# Patient Record
Sex: FEMALE | Race: WHITE | NOT HISPANIC OR LATINO | Employment: OTHER | URBAN - METROPOLITAN AREA
[De-identification: names, ages, dates, MRNs, and addresses within clinical notes are randomized per-mention and may not be internally consistent; named-entity substitution may affect disease eponyms.]

---

## 2017-01-04 ENCOUNTER — OFFICE VISIT (OUTPATIENT)
Dept: FAMILY MEDICINE | Facility: CLINIC | Age: 72
End: 2017-01-04
Payer: COMMERCIAL

## 2017-01-04 VITALS
TEMPERATURE: 98.7 F | WEIGHT: 151.38 LBS | DIASTOLIC BLOOD PRESSURE: 82 MMHG | SYSTOLIC BLOOD PRESSURE: 152 MMHG | BODY MASS INDEX: 25.84 KG/M2 | HEART RATE: 78 BPM | HEIGHT: 64 IN

## 2017-01-04 DIAGNOSIS — I10 ESSENTIAL HYPERTENSION WITH GOAL BLOOD PRESSURE LESS THAN 140/90: Primary | ICD-10-CM

## 2017-01-04 DIAGNOSIS — L70.9 ACNE, UNSPECIFIED ACNE TYPE: ICD-10-CM

## 2017-01-04 DIAGNOSIS — F41.9 ANXIETY: ICD-10-CM

## 2017-01-04 DIAGNOSIS — R21 SKIN RASH: ICD-10-CM

## 2017-01-04 DIAGNOSIS — R53.83 OTHER FATIGUE: ICD-10-CM

## 2017-01-04 PROCEDURE — 99214 OFFICE O/P EST MOD 30 MIN: CPT | Performed by: FAMILY MEDICINE

## 2017-01-04 PROCEDURE — 80048 BASIC METABOLIC PNL TOTAL CA: CPT | Performed by: FAMILY MEDICINE

## 2017-01-04 PROCEDURE — 36415 COLL VENOUS BLD VENIPUNCTURE: CPT | Performed by: FAMILY MEDICINE

## 2017-01-04 RX ORDER — TRIAMCINOLONE ACETONIDE 1 MG/G
CREAM TOPICAL 2 TIMES DAILY
Qty: 30 G | Refills: 1 | Status: SHIPPED | OUTPATIENT
Start: 2017-01-04 | End: 2019-02-11

## 2017-01-04 RX ORDER — CLOTRIMAZOLE 1 %
CREAM (GRAM) TOPICAL 2 TIMES DAILY
Qty: 60 G | Refills: 1 | Status: SHIPPED | OUTPATIENT
Start: 2017-01-04 | End: 2019-02-11

## 2017-01-04 RX ORDER — HYDROCHLOROTHIAZIDE 25 MG/1
25 TABLET ORAL DAILY
Qty: 90 TABLET | Refills: 3 | Status: SHIPPED | OUTPATIENT
Start: 2017-01-04 | End: 2018-01-10

## 2017-01-04 RX ORDER — CLOTRIMAZOLE 1 %
CREAM (GRAM) TOPICAL 2 TIMES DAILY
Qty: 60 G | Refills: 1 | Status: CANCELLED | OUTPATIENT
Start: 2017-01-04

## 2017-01-04 RX ORDER — BENZOYL PEROXIDE 5 G/100G
GEL TOPICAL DAILY PRN
Qty: 60 G | Refills: 1 | Status: SHIPPED | OUTPATIENT
Start: 2017-01-04 | End: 2019-02-11

## 2017-01-04 RX ORDER — CITALOPRAM HYDROBROMIDE 20 MG/1
10-20 TABLET ORAL DAILY
Qty: 90 TABLET | Refills: 3 | Status: SHIPPED | OUTPATIENT
Start: 2017-01-04 | End: 2018-03-14

## 2017-01-04 NOTE — PROGRESS NOTES
SUBJECTIVE:                                                    Julia Tabares is a 71 year old female who presents to clinic today for the following health issues:      Hypertension Follow-up      Outpatient blood pressures are being checked at home.  Results are 148/90's are highest. She feels more calm when taking the new pill, no further hot flashes.    Low Salt Diet: low salt       Amount of exercise or physical activity: 4-5 days/week for an average of greater than 60 minutes    Problems taking medications regularly: No    Medication side effects: none  Diet: low salt      Rash     Onset: has had for a few years     Description:   Location: belly button  Character: red  Itching (Pruritis): no     Progression of Symptoms:  same    Accompanying Signs & Symptoms:  Fever: no   Body aches or joint pain: YES- ago related  Sore throat symptoms: no   Recent cold symptoms: no    History:   Previous similar rash: YES- has been ongoing    Precipitating factors:   Exposure to similar rash: no   New exposures: None   Recent travel: no     Alleviating factors:  Lotrimin cream that was prescribed works, she needs a refill     Therapies Tried and outcome: Lotrimin and steroid cream.      Uses benzoyl peroxide for acne treatment as needed    Right wrist pain.  No change. She wears a compression glove that she bought on line that helps a little. She did not see ortho as they didn't call her. The pain wakes her up at night sometimes.  Pain is 8-9/10 routinely.    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, Silver Sneakers, and Bar  Quit teaching [employer] due to her knees  Thinking she'll probably do this for 2 more years  Every 4 months tries to change up her routines    Tiredness   says she doesn't snore  Feels drowsy enough to take a nap during the day and very drowsy after teaching  Doesn't feel drowsy driving    Says she also goes to bed late - sleeps   If she naps in the afternoon, goes to bed at 11:30 and gets up at 6:30/ 7 am  Has a hard time taking short naps   If she doesn't nap, still goes to bed late  Doesn't watch TV before bed, just reads before bed    Blood Pressure  Has been taking her 12.5 mg tablets of HCTZ and notices that she has to urinate more often   152/90 are home readings    Taking apple cider vinegar  Mother and dad both had high blood pressure      Has been using wrist sleeve       Problem list and histories reviewed & adjusted, as indicated.  Additional history: as documented    Patient Active Problem List   Diagnosis     Crohn's colitis (H)     Anxiety     Postmenopausal atrophic vaginitis     Advanced directives, counseling/discussion     Hyperlipidemia LDL goal <160     Left knee DJD     CTS (carpal tunnel syndrome)     Osteoporosis     Hypertension goal BP (blood pressure) < 140/90     Essential hypertension with goal blood pressure less than 140/90     Past Surgical History   Procedure Laterality Date     Hc reduction of large breast       C/section, low transverse  x3     , Low Transverse       Social History   Substance Use Topics      Smoking status: Former Smoker     Quit date: 10/13/1992     Smokeless tobacco: Never Used     Alcohol Use: No     Family History   Problem Relation Age of Onset     CANCER Mother      Hypertension Mother      HEART DISEASE Father      DIABETES No family hx of          Current Outpatient Prescriptions   Medication Sig Dispense Refill     hydrochlorothiazide 12.5 MG TABS Take 1 tablet (12.5 mg) by mouth daily 90 tablet 1     clotrimazole (LOTRIMIN) 1 % cream Apply topically 2 times daily 60 g 1     UNABLE TO FIND MEDICATION NAME: Cur Man Vitamin       UNABLE TO FIND MEDICATION NAME: Collagen vitamin       diclofenac (VOLTAREN) 1 % GEL Apply 4 grams to knees or 2 grams to hands four times daily using enclosed dosing card. 100 g 1     triamcinolone (KENALOG) 0.1 % cream Apply  topically 2 times daily. 30 g 0     mesalamine (ASACOL) 400 MG EC tablet 4 TABLETS DAILY       Calcium-Vitamin D (CALCIUM + D PO) Take  by mouth. ONCE DAILY.        Multiple Vitamin (MULTIVITAMIN OR) Take  by mouth. ONCE DAILY.        No Known Allergies  Recent Labs   Lab Test  10/17/16   1331  06/01/16   1033  05/29/15   1313  04/21/14   1333   LDL   --   137*  128  161*   HDL   --   68  68  57   TRIG   --   59  58  74   ALT  20   --   27  23   CR  0.75  0.72  0.70  0.75   GFRESTIMATED  76  81  82  77   GFRESTBLACK  >90   GFR Calc    >90   GFR Calc    >90   GFR Calc    >90   POTASSIUM  3.7  4.4  3.7  3.7   TSH  1.40   --   1.55  1.63      BP Readings from Last 3 Encounters:   01/04/17 164/82   10/17/16 136/70   09/16/16 135/70    Wt Readings from Last 3 Encounters:   01/04/17 151 lb 6 oz (68.663 kg)   10/17/16 148 lb 12.8 oz (67.495 kg)   09/16/16 149 lb (67.586 kg)                  Problem list, Medication list, Allergies, and Medical/Social/Surgical histories reviewed in EPIC and updated as appropriate.    ROS:  Constitutional, neuro, ENT, endocrine, pulmonary, cardiac, gastrointestinal,  "genitourinary, musculoskeletal, integument and psychiatric systems are negative, except as otherwise noted.      This document serves as a record of the services and decisions personally performed and made by Shanna Rodriguez. It was created on her behalf by Nancy Faulkner, a trained medical scribe. The creation of this document is based the provider's statements to the medical scribe.  Nancy Faulkner January 4, 2017 11:27 AM    OBJECTIVE:                                                    /82 mmHg  Pulse 78  Temp(Src) 98.7  F (37.1  C) (Oral)  Ht 5' 3.5\" (1.613 m)  Wt 151 lb 6 oz (68.663 kg)  BMI 26.39 kg/m2  Body mass index is 26.39 kg/(m^2).  GENERAL APPEARANCE: healthy, alert and no distress  SKIN: no suspicious lesions or rashes  PSYCH: mentation appears normal and affect normal/bright         ASSESSMENT/PLAN:                                                      Julia was seen today for musculoskeletal problem, hypertension and derm problem.    Diagnoses and all orders for this visit:    Essential hypertension with goal blood pressure less than 140/90  -     hydrochlorothiazide (HYDRODIURIL) 25 MG tablet; Take 1 tablet (25 mg) by mouth daily  -     Basic metabolic panel  - Not well controlled, increase HCTZ to 25 mg daily     Anxiety  -     citalopram (CELEXA) 20 MG tablet; Take 0.5-1 tablets (10-20 mg) by mouth daily  - Feels better when on SSRI, she will go back to taking 20 mg daily   - Weaning off citalopram did not help fatigue    Skin rash  -     triamcinolone (KENALOG) 0.1 % cream; Apply topically 2 times daily  -     clotrimazole (LOTRIMIN) 1 % cream; Apply topically 2 times daily  - Has recurrent rash at belly button, refills done    Acne, unspecified acne type  -     benzoyl peroxide 5 % topical gel; Apply topically daily as needed  - Chronic, stable, well controlled, continue current medication, refill done if needed    Other fatigue  -     SLEEP EVALUATION & MANAGEMENT REFERRAL - " ADULT; Future  -  patient will try napping less and having better routine over night, discussed stress management, can consider sleep evaluation      Follow up with Provider - 6 weeks for blood pressure recheck, will schedule with ortho for hand eval    Patient Instructions   See specialist for you wrist.    I will refill your citalopram and increase your HCTZ (blood pressure medication) dose.          The information in this document, created by the medical scribe, Nancy Faulkner, for me, accurately reflects the services I personally performed and the decisions made by me. I have reviewed and approved this document for accuracy prior to leaving the patient care area.  Shanna Rodriguez, January 4, 2017 11:26 AM      Shanna Rodriguez MD  [facility]

## 2017-01-04 NOTE — PATIENT INSTRUCTIONS
See specialist for you wrist.    I will refill your citalopram and increase your HCTZ (blood pressure medication) dose.

## 2017-01-04 NOTE — Clinical Note
[facility]  [address] [place] [id number] [phone]      January 6, 2017      Julia Tabares  [address] [facility] [address]          Dear Ms. Tabares    The results of your recent lab tests were within normal limits. Enclosed is a copy of these results.  If you have any further questions or problems, please contact our office.  Results for orders placed or performed in visit on 01/04/17   Basic metabolic panel   Result Value Ref Range    Sodium 139 133 - 144 mmol/L    Potassium 4.1 3.4 - 5.3 mmol/L    Chloride 101 94 - 109 mmol/L    Carbon Dioxide 30 20 - 32 mmol/L    Anion Gap 8 3 - 14 mmol/L    Glucose 86 70 - 99 mg/dL    Urea Nitrogen 14 7 - 30 mg/dL    Creatinine 0.82 0.52 - 1.04 mg/dL    GFR Estimate 69 >60 mL/min/1.7m2    GFR Estimate If Black 84 >60 mL/min/1.7m2    Calcium 9.6 8.5 - 10.1 mg/dL       Sincerely,      Shanna Rodriguez MD/wes

## 2017-01-04 NOTE — NURSING NOTE
"Chief Complaint   Patient presents with     Musculoskeletal Problem       Initial /82 mmHg  Pulse 78  Temp(Src) 98.7  F (37.1  C) (Oral)  Ht 5' 3.5\" (1.613 m)  Wt 151 lb 6 oz (68.663 kg)  BMI 26.39 kg/m2 Estimated body mass index is 26.39 kg/(m^2) as calculated from the following:    Height as of this encounter: 5' 3.5\" (1.613 m).    Weight as of this encounter: 151 lb 6 oz (68.663 kg).  BP completed using cuff size: josette LEMUS Certified Medical Assistant (AAMA)January 4, 2017 11:03 AM      "

## 2017-01-04 NOTE — MR AVS SNAPSHOT
"              After Visit Summary   1/4/2017    Julia Tabares    MRN: [id number]           Patient Information     Date Of Birth          1945        Visit Information        Provider Department      1/4/2017 11:00 AM Shanna Rodriguez MD [facility]        Today's Diagnoses     Essential hypertension with goal blood pressure less than 140/90    -  1     Anxiety         Skin rash         Acne, unspecified acne type           Care Instructions    See specialist for you wrist.    I will refill your citalopram and increase your HCTZ (blood pressure medication) dose.         Follow-ups after your visit        Who to contact     If you have questions or need follow up information about today's clinic visit or your schedule please contact [facility] directly at [phone].  Normal or non-critical lab and imaging results will be communicated to you by MyChart, letter or phone within 4 business days after the clinic has received the results. If you do not hear from us within 7 days, please contact the clinic through MyChart or phone. If you have a critical or abnormal lab result, we will notify you by phone as soon as possible.  Submit refill requests through [employer] or call your pharmacy and they will forward the refill request to us. Please allow 3 business days for your refill to be completed.          Additional Information About Your Visit        [employer] Information     [employer] lets you send messages to your doctor, view your test results, renew your prescriptions, schedule appointments and more. To sign up, go to [email or web address] . Click on \"Log in\" on the left side of the screen, which will take you to the Welcome page. Then click on \"Sign up Now\" on the right side of the page.     You will be asked to enter the access code listed below, as well as some personal information. Please follow the directions to create your username and password.     Your access code " "is: [email or web address]  Expires: 2017 12:13 PM     Your access code will  in 90 days. If you need help or a new code, please call your [facility] clinic or [phone].        Care EveryWhere ID     This is your Care EveryWhere ID. This could be used by other organizations to access your [facility] medical records  [id number]        Your Vitals Were     Pulse Temperature Height BMI (Body Mass Index)          78 98.7  F (37.1  C) (Oral) 5' 3.5\" (1.613 m) 26.39 kg/m2         Blood Pressure from Last 3 Encounters:   17 164/82   10/17/16 136/70   16 135/70    Weight from Last 3 Encounters:   17 151 lb 6 oz (68.663 kg)   10/17/16 148 lb 12.8 oz (67.495 kg)   16 149 lb (67.586 kg)              We Performed the Following     Basic metabolic panel          Today's Medication Changes          These changes are accurate as of: 17 12:13 PM.  If you have any questions, ask your nurse or doctor.               Start taking these medicines.        Dose/Directions    benzoyl peroxide 5 % topical gel   Used for:  Acne, unspecified acne type   Started by:  Shanna Rodriguez MD        Apply topically daily as needed   Quantity:  60 g   Refills:  1       citalopram 20 MG tablet   Commonly known as:  celeXA   Used for:  Anxiety   Started by:  Shanna Rodriguez MD        Dose:  10-20 mg   Take 0.5-1 tablets (10-20 mg) by mouth daily   Quantity:  90 tablet   Refills:  3         These medicines have changed or have updated prescriptions.        Dose/Directions    hydrochlorothiazide 25 MG tablet   Commonly known as:  HYDRODIURIL   This may have changed:    - medication strength  - how much to take   Used for:  Essential hypertension with goal blood pressure less than 140/90   Changed by:  Shanna Rodriguez MD        Dose:  25 mg   Take 1 tablet (25 mg) by mouth daily   Quantity:  90 tablet   Refills:  3            Where to get your medicines      These medications were sent to [facility] " [id number] - [place] - [address] [facility] [address]  [address] [facility] [address]     Phone:  [phone]    - benzoyl peroxide 5 % topical gel  - citalopram 20 MG tablet  - clotrimazole 1 % cream  - hydrochlorothiazide 25 MG tablet  - triamcinolone 0.1 % cream             Primary Care Provider Office Phone # Fax #    Shanna Rodriguez [phone]       [facility] [address] [facility] [address]        Thank you!     Thank you for choosing [facility]  for your care. Our goal is always to provide you with excellent care. Hearing back from our patients is one way we can continue to improve our services. Please take a few minutes to complete the written survey that you may receive in the mail after your visit with us. Thank you!             Your Updated Medication List - Protect others around you: Learn how to safely use, store and throw away your medicines at www.disposemymeds.org.          This list is accurate as of: 1/4/17 12:13 PM.  Always use your most recent med list.                   Brand Name Dispense Instructions for use    ASACOL 400 MG EC tablet   Generic drug:  mesalamine      4 TABLETS DAILY       benzoyl peroxide 5 % topical gel     60 g    Apply topically daily as needed       CALCIUM + D PO      Take  by mouth. ONCE DAILY.       citalopram 20 MG tablet    celeXA    90 tablet    Take 0.5-1 tablets (10-20 mg) by mouth daily       clotrimazole 1 % cream    LOTRIMIN    60 g    Apply topically 2 times daily       diclofenac 1 % Gel topical gel    VOLTAREN    100 g    Apply 4 grams to knees or 2 grams to hands four times daily using enclosed dosing card.       hydrochlorothiazide 25 MG tablet    HYDRODIURIL    90 tablet    Take 1 tablet (25 mg) by mouth daily       MULTIVITAMIN PO      Take  by mouth. ONCE DAILY.       triamcinolone 0.1 % cream    KENALOG    30 g    Apply topically 2 times daily       *  UNABLE TO FIND      MEDICATION NAME: Cur Man Vitamin       * UNABLE TO FIND      MEDICATION NAME: Collagen vitamin       * Notice:  This list has 2 medication(s) that are the same as other medications prescribed for you. Read the directions carefully, and ask your doctor or other care provider to review them with you.

## 2017-01-05 LAB
ANION GAP SERPL CALCULATED.3IONS-SCNC: 8 MMOL/L (ref 3–14)
BUN SERPL-MCNC: 14 MG/DL (ref 7–30)
CALCIUM SERPL-MCNC: 9.6 MG/DL (ref 8.5–10.1)
CHLORIDE SERPL-SCNC: 101 MMOL/L (ref 94–109)
CO2 SERPL-SCNC: 30 MMOL/L (ref 20–32)
CREAT SERPL-MCNC: 0.82 MG/DL (ref 0.52–1.04)
GFR SERPL CREATININE-BSD FRML MDRD: 69 ML/MIN/1.7M2
GLUCOSE SERPL-MCNC: 86 MG/DL (ref 70–99)
POTASSIUM SERPL-SCNC: 4.1 MMOL/L (ref 3.4–5.3)
SODIUM SERPL-SCNC: 139 MMOL/L (ref 133–144)

## 2017-03-20 ENCOUNTER — OFFICE VISIT (OUTPATIENT)
Dept: ORTHOPEDICS | Facility: CLINIC | Age: 72
End: 2017-03-20
Payer: COMMERCIAL

## 2017-03-20 ENCOUNTER — RADIANT APPOINTMENT (OUTPATIENT)
Dept: ULTRASOUND IMAGING | Facility: CLINIC | Age: 72
End: 2017-03-20
Attending: PEDIATRICS
Payer: COMMERCIAL

## 2017-03-20 ENCOUNTER — TELEPHONE (OUTPATIENT)
Dept: ORTHOPEDICS | Facility: CLINIC | Age: 72
End: 2017-03-20

## 2017-03-20 VITALS
HEIGHT: 64 IN | SYSTOLIC BLOOD PRESSURE: 160 MMHG | BODY MASS INDEX: 25.84 KG/M2 | WEIGHT: 151.38 LBS | HEART RATE: 134 BPM | DIASTOLIC BLOOD PRESSURE: 71 MMHG

## 2017-03-20 DIAGNOSIS — M79.605 LEFT LEG PAIN: ICD-10-CM

## 2017-03-20 DIAGNOSIS — M17.0 PRIMARY OSTEOARTHRITIS OF BOTH KNEES: Primary | ICD-10-CM

## 2017-03-20 DIAGNOSIS — M79.89 LEFT LEG SWELLING: ICD-10-CM

## 2017-03-20 PROCEDURE — 93971 EXTREMITY STUDY: CPT | Mod: LT

## 2017-03-20 PROCEDURE — 99214 OFFICE O/P EST MOD 30 MIN: CPT | Performed by: PEDIATRICS

## 2017-03-20 NOTE — TELEPHONE ENCOUNTER
Received VM. They are unable to see Julia today for an u/s and will need to reschedule. Please call [phone].

## 2017-03-20 NOTE — PROGRESS NOTES
"Sports Medicine Clinic Visit - Interim History 2017    Initial Visit Date Visit date not found    PCP: Shanna Rodriguez    Julia Tabares is a 71 year old female who is seen in f/u up for Primary osteoarthritis of both knees. Since last visit on 2016, patient reports pain and swelling in her posterior left knee and calf, limiting her range of motion. No new injury.  She has had overall good relief from the synvisc injection 2016.    Symptoms are better with: nothing  Symptoms are worse with: flexion, unable to get to 90 degrees  Additional Features:   Positive: swellin   Negative: bruising, popping, grinding, catching, locking, paresthesias, numbness, weakness, pain in other joints and systemic symptoms    Social History:     Review of Systems  Skin: no bruising, yes swelling  Musculoskeletal: as above  Neurologic: no numbness, paresthesias  Remainder of review of systems is negative including constitutional, CV, pulmonary, GI, except as noted in HPI or medical history.    Patient's current problem list, past medical and surgical history, and family history were reviewed.    Patient Active Problem List   Diagnosis     Crohn's colitis (H)     Anxiety     Postmenopausal atrophic vaginitis     Advanced directives, counseling/discussion     Hyperlipidemia LDL goal <160     Left knee DJD     CTS (carpal tunnel syndrome)     Osteoporosis     Hypertension goal BP (blood pressure) < 140/90     Essential hypertension with goal blood pressure less than 140/90     Past Medical History   Diagnosis Date     Anxiety      Crohn's colitis (H)      Past Surgical History   Procedure Laterality Date     Hc reduction of large breast       C/section, low transverse  x3     , Low Transverse     Family History   Problem Relation Age of Onset     CANCER Mother      Hypertension Mother      HEART DISEASE Father      DIABETES No family hx of        Objective  /71  Pulse 134  Ht 5' 3.5\" " (1.613 m)  Wt 151 lb 6 oz (68.7 kg)  BMI 26.39 kg/m2    GENERAL APPEARANCE: healthy, alert and no distress   GAIT: NORMAL  SKIN: no suspicious lesions or rashes  HEENT: Sclera clear, anicteric  CV: good peripheral pulses  RESP: Breathing not labored  NEURO: Normal strength and tone, mentation intact and speech normal  PSYCH:  mentation appears normal and affect normal/bright    Bilateral Knee exam    Inspection:      no effusion, swelling of bruising bilateral       Mild posterior swelling on the left with some fullness throughout the calf on that side    Patella:      Mobility -       hypomobile bilateral       Crepitus noted in the patellofemoral joint bilateral    Tender:      Posterior left knee    Non Tender:      remainder of knee area bilateral    Knee ROM:      Flexion 90 degrees left       Extension 5 degrees left    Strength:      5/5 with knee extension bilateral    Special Tests:     neg (-) Nia left       neg (-) anterior drawer left       neg (-) posterior drawer left       neg (-) varus at 0 and 30 left       neg (-) valgus at 0 and 30 left    Gait:      normal    Neurovascular:      2+ peripheral pulses bilaterally and brisk capillary refill       sensation grossly intact    Radiology  None new    Assessment:  1. Primary osteoarthritis of both knees    2. Left leg pain    3. Left leg swelling      1) Left Leg Swelling: Differential includes blood clot along with Baker's Cyst.  Will obtain US to evaluate for blood clot.  Discussed treatment for Baker's Cyst including compression, ice, rest and possible aspiration and/or injection.    2) Osteoarthritis:  Received good relief from last Synvisc injection, can repeat as it's been 6 months.  She will hold on scheduling pending US results.    Plan:  - Today's Plan of Care:  Ultrasound of the left lower extremity - 3:10 at [facility], arrive 15 minutes prior.    -We also discussed other future treatment options:  Ultrasound guided Synvisc One  injection of left knee pending results    Follow Up: will call with results    Concerning signs and symptoms were reviewed.  The patient expressed understanding of this management plan and all questions were answered at this time.    Indu Chua MD CAQ  Primary Care Sports Medicine  [facility]

## 2017-03-20 NOTE — MR AVS SNAPSHOT
After Visit Summary   3/20/2017    Julia Tbaares    MRN: [id number]           Patient Information     Date Of Birth          1945        Visit Information        Provider Department      3/20/2017 12:40 PM Indu Chua MD [facility]        Today's Diagnoses     Primary osteoarthritis of both knees    -  1    Left leg pain          Care Instructions    Plan:  - Today's Plan of Care:  Ultrasound of the left lower extremity - 3:10 at [facility], arrive 15 minutes prior.    -We also discussed other future treatment options:  Ultrasound guided Synvisc One injection of left knee pending results    Follow Up: will call with results    If you have any further questions for your physician or physician s care team you can call [phone] and use option 3 to leave a voice message. Calls received during business hours will be returned same day.          Follow-ups after your visit        Your next 10 appointments already scheduled     Mar 20, 2017  3:10 PM CDT   US LOWER EXTREMITY VENOUS DUPLEX LEFT with BEUS1   [facility]    [id number] [place] [id number] [phone]           Please bring a list of your medicines (including vitamins, minerals and over-the-counter drugs). Also, tell your doctor about any allergies you may have. Wear comfortable clothes and leave your valuables at home.  You do not need to do anything special to prepare for your exam.  Please call the Imaging Department at your exam site with any questions.            Apr 03, 2017  2:00 PM CDT   [facility] Visit with [facility]   [facility]    [id number] [place] [address] [phone]              Future tests that were ordered for you today     Open Future Orders        Priority Expected Expires Ordered    US Lower Extremity Venous Duplex Left STAT  3/20/2018  "3/20/2017            Who to contact     If you have questions or need follow up information about today's clinic visit or your schedule please contact [facility] directly at [phone].  Normal or non-critical lab and imaging results will be communicated to you by MyChart, letter or phone within 4 business days after the clinic has received the results. If you do not hear from us within 7 days, please contact the clinic through MyChart or phone. If you have a critical or abnormal lab result, we will notify you by phone as soon as possible.  Submit refill requests through Samplesaint or call your pharmacy and they will forward the refill request to us. Please allow 3 business days for your refill to be completed.          Additional Information About Your Visit        Diagnostic [employer]ar[employer]koo Information     Samplesaint gives you secure access to your electronic health record. If you see a primary care provider, you can also send messages to your care team and make appointments. If you have questions, please call your primary care clinic.  If you do not have a primary care provider, please call [phone] and they will assist you.        Care EveryWhere ID     This is your Care EveryWhere ID. This could be used by other organizations to access your [facility] medical records  [id number]        Your Vitals Were     Pulse Height BMI (Body Mass Index)             134 5' 3.5\" (1.613 m) 26.39 kg/m2          Blood Pressure from Last 3 Encounters:   03/20/17 160/71   01/04/17 152/82   10/17/16 136/70    Weight from Last 3 Encounters:   03/20/17 151 lb 6 oz (68.7 kg)   01/04/17 151 lb 6 oz (68.7 kg)   10/17/16 148 lb 12.8 oz (67.5 kg)               Primary Care Provider Office Phone # Fax #    Shanna Rodriguez [phone]       [address] [address]        Thank you!     Thank you for choosing [facility]  for " your care. Our goal is always to provide you with excellent care. Hearing back from our patients is one way we can continue to improve our services. Please take a few minutes to complete the written survey that you may receive in the mail after your visit with us. Thank you!             Your Updated Medication List - Protect others around you: Learn how to safely use, store and throw away your medicines at www.disposemymeds.org.          This list is accurate as of: 3/20/17  1:20 PM.  Always use your most recent med list.                   Brand Name Dispense Instructions for use    ASACOL 400 MG EC tablet   Generic drug:  mesalamine      4 TABLETS DAILY       benzoyl peroxide 5 % topical gel     60 g    Apply topically daily as needed       CALCIUM + D PO      Take  by mouth. ONCE DAILY.       citalopram 20 MG tablet    celeXA    90 tablet    Take 0.5-1 tablets (10-20 mg) by mouth daily       clotrimazole 1 % cream    LOTRIMIN    60 g    Apply topically 2 times daily       diclofenac 1 % Gel topical gel    VOLTAREN    100 g    Apply 4 grams to knees or 2 grams to hands four times daily using enclosed dosing card.       hydrochlorothiazide 25 MG tablet    HYDRODIURIL    90 tablet    Take 1 tablet (25 mg) by mouth daily       MULTIVITAMIN PO      Take  by mouth. ONCE DAILY.       triamcinolone 0.1 % cream    KENALOG    30 g    Apply topically 2 times daily       * UNABLE TO FIND      MEDICATION NAME: Cur Man Vitamin       * UNABLE TO FIND      MEDICATION NAME: Collagen vitamin       * Notice:  This list has 2 medication(s) that are the same as other medications prescribed for you. Read the directions carefully, and ask your doctor or other care provider to review them with you.

## 2017-03-20 NOTE — PATIENT INSTRUCTIONS
Plan:  - Today's Plan of Care:  Ultrasound of the left lower extremity - 3:10 at [facility] arrive 15 minutes prior.    -We also discussed other future treatment options:  Ultrasound guided Synvisc One injection of left knee pending results    Follow Up: will call with results    If you have any further questions for your physician or physician s care team you can call [phone] and use option 3 to leave a voice message. Calls received during business hours will be returned same day.

## 2017-03-21 NOTE — TELEPHONE ENCOUNTER
FRANTZ for return call, asked that she leave a few times that would work for us to call her.    Verna Ramos, ATC

## 2017-03-22 NOTE — TELEPHONE ENCOUNTER
Spoke to patient. Helped schedule with Dr. Solorzano for Friday May 31st for synvisc injections.    JAZLYN Allred,  ATC

## 2017-04-21 ENCOUNTER — OFFICE VISIT (OUTPATIENT)
Dept: ORTHOPEDICS | Facility: CLINIC | Age: 72
End: 2017-04-21
Payer: COMMERCIAL

## 2017-04-21 VITALS
HEIGHT: 64 IN | BODY MASS INDEX: 25.78 KG/M2 | SYSTOLIC BLOOD PRESSURE: 118 MMHG | WEIGHT: 151 LBS | DIASTOLIC BLOOD PRESSURE: 78 MMHG

## 2017-04-21 DIAGNOSIS — M17.0 PRIMARY OSTEOARTHRITIS OF BOTH KNEES: Primary | ICD-10-CM

## 2017-04-21 PROCEDURE — 20611 DRAIN/INJ JOINT/BURSA W/US: CPT | Mod: 50 | Performed by: FAMILY MEDICINE

## 2017-04-21 NOTE — PROGRESS NOTES
Julia Tabares  :  1945  DOS: 17  MRN: [id number]    Sports Medicine Clinic Procedure    Ultrasound Guided Bilateral Intra-Articular Knee SynviscOne Injection    Clinical History: Julia Tabares is a 70 year old female who is seen in f/u up for knee osteoarthritis. Since last visit on 2015 patient reports right sided knee pain has been exacerbated over the past 2 months.  - Pain is mostly anterior and medial side.  Mild swelling.  No new injury.  Left knee is somewhat painful, however, not as bad.  Hasn't had an injection since 2014 - has gotten both intra-articular and pes injections from Dr. Meng in th past.  PT after last visit helped some.  Synvisc One injection completed 16 provided good relief for ~ 6 months, note worsening bilateral knee pain left>>>right over last 3- 4 weeks.  Moderate pain swelling noted in posterior aspect of left knee - baker's cyst noted on US completed 3/20/17.    Diagnosis:   1. Primary osteoarthritis of both knees      Referring Physician: Indu Chua MD  Technique: The risks of the procedure were explained to the patient.  A consent was signed for the intra-articular knee procedure.  The patient was evaluated with a [employer] ultrasound machine using a 12 MHz linear probe.     The Bilateral knee was prepped and draped in a sterile manner.  Ultrasound identification of the patella, suprapatellar pouch, quadriceps tendon and femur in both long and short axis. The probe was placed in long axis to the Bilateral femur.  3 ml's of lidocaine used for local anesthetic.  A 1.5 inch 18 gauge needle was placed under ultrasound guidance into the superior knee joint.  18 ml of straw colored fluid was aspirated from right, 3 ml aspirated from left.   6ml of SynviscOne (48 units) into the bilateral knee. The needle was removed and there was good hemostasis without complications.  There was ultrasound documentation of needle placement and injection.       Impression:  Successful Bilateral intra-articular knee SynviscOne and injection.    Plan:  Follow up in 2 weeks for possible baker's cyst aspiration if not getting relief.  Expectations and limitations of synvisc were reviewed in detail  Often 4-6 weeks before full effect may be noticed  Usually covered up to every 6 months by insurance, but does not need to be repeated unless pain returns, at which point we would re-evaluate  Potential use of CSI in future for flares of pain reviewed in detail  Encouraged modified progressive pain-free activity as tolerated  HEP and Supportive care reviewed  All questions were answered today  Contact us with additional questions or concerns  Signs and sx of concern reviewed    Shashi Solorzano DO, CANAUN  Primary Care Sports Medicine  [facility]

## 2017-04-21 NOTE — NURSING NOTE
"Chief Complaint   Patient presents with     Knee Pain     bilateral knee - US injection       Initial /78  Ht 5' 3.5\" (1.613 m)  Wt 151 lb (68.5 kg)  BMI 26.33 kg/m2 Estimated body mass index is 26.33 kg/(m^2) as calculated from the following:    Height as of this encounter: 5' 3.5\" (1.613 m).    Weight as of this encounter: 151 lb (68.5 kg).  Medication Reconciliation: complete     Harvey Salas, ATC  "

## 2017-05-12 ENCOUNTER — OFFICE VISIT (OUTPATIENT)
Dept: ORTHOPEDICS | Facility: CLINIC | Age: 72
End: 2017-05-12
Payer: COMMERCIAL

## 2017-05-12 VITALS
BODY MASS INDEX: 25.78 KG/M2 | WEIGHT: 151 LBS | DIASTOLIC BLOOD PRESSURE: 75 MMHG | SYSTOLIC BLOOD PRESSURE: 120 MMHG | HEIGHT: 64 IN

## 2017-05-12 DIAGNOSIS — M17.0 PRIMARY OSTEOARTHRITIS OF BOTH KNEES: ICD-10-CM

## 2017-05-12 DIAGNOSIS — M71.22 BAKER'S CYST, LEFT: Primary | ICD-10-CM

## 2017-05-12 PROCEDURE — 20611 DRAIN/INJ JOINT/BURSA W/US: CPT | Mod: LT | Performed by: FAMILY MEDICINE

## 2017-05-12 PROCEDURE — 99214 OFFICE O/P EST MOD 30 MIN: CPT | Mod: 25 | Performed by: FAMILY MEDICINE

## 2017-05-12 RX ORDER — TRIAMCINOLONE ACETONIDE 40 MG/ML
40 INJECTION, SUSPENSION INTRA-ARTICULAR; INTRAMUSCULAR ONCE
Qty: 1 ML | Refills: 0 | OUTPATIENT
Start: 2017-05-12 | End: 2017-05-12

## 2017-05-12 NOTE — NURSING NOTE
"Chief Complaint   Patient presents with     Knee Pain     f/u chronic chronic left knee pain       Initial /75  Ht 5' 3.5\" (1.613 m)  Wt 151 lb (68.5 kg)  BMI 26.33 kg/m2 Estimated body mass index is 26.33 kg/(m^2) as calculated from the following:    Height as of this encounter: 5' 3.5\" (1.613 m).    Weight as of this encounter: 151 lb (68.5 kg).  Medication Reconciliation: complete     Harvey Salas ATC  "

## 2017-05-12 NOTE — MR AVS SNAPSHOT
After Visit Summary   5/12/2017    Julia Tabares    MRN: [id number]           Patient Information     Date Of Birth          1945        Visit Information        Provider Department      5/12/2017 1:00 PM Shashi Solorzano DO [facility]        Today's Diagnoses     Baker's cyst, left    -  1    Primary osteoarthritis of both knees           Follow-ups after your visit        Your next 10 appointments already scheduled     May 15, 2017  2:00 PM CDT   New Visit with [facility]ne [facility] MD   [facility]    [id number] [place] [id number] [phone]              Who to contact     If you have questions or need follow up information about today's clinic visit or your schedule please contact [facility] directly at [phone].  Normal or non-critical lab and imaging results will be communicated to you by MyChart, letter or phone within 4 business days after the clinic has received the results. If you do not hear from us within 7 days, please contact the clinic through Yohobuyhart or phone. If you have a critical or abnormal lab result, we will notify you by phone as soon as possible.  Submit refill requests through English[employer] or call your pharmacy and they will forward the refill request to us. Please allow 3 business days for your refill to be completed.          Additional Information About Your Visit        MyChart Information     EnglishCentral gives you secure access to your electronic health record. If you see a primary care provider, you can also send messages to your care team and make appointments. If you have questions, please call your primary care clinic.  If you do not have a primary care provider, please call [phone] and they will assist you.        Care EveryWhere ID     This is your Care EveryWhere ID. This could be used by other organizations to access your  "[facility] medical records  [id number]        Your Vitals Were     Height BMI (Body Mass Index)                5' 3.5\" (1.613 m) 26.33 kg/m2           Blood Pressure from Last 3 Encounters:   05/12/17 120/75   04/21/17 118/78   03/20/17 160/71    Weight from Last 3 Encounters:   05/12/17 151 lb (68.5 kg)   04/21/17 151 lb (68.5 kg)   03/20/17 151 lb 6 oz (68.7 kg)              We Performed the Following     HC ARTHROCENTESIS ASPIR&/INJ MAJOR JT/BURSA W/US     TRIAMCINOLONE ACET INJ NOS          Today's Medication Changes          These changes are accurate as of: 5/12/17  1:48 PM.  If you have any questions, ask your nurse or doctor.               Start taking these medicines.        Dose/Directions    triamcinolone acetonide 40 MG/ML injection   Commonly known as:  KENALOG-40   Used for:  Baker's cyst, left   Started by:  Shashi Solorzano,         Dose:  40 mg   1 mL (40 mg) by INTRA-ARTICULAR route once for 1 dose   Quantity:  1 mL   Refills:  0            Where to get your medicines      Some of these will need a paper prescription and others can be bought over the counter.  Ask your nurse if you have questions.     You don't need a prescription for these medications     triamcinolone acetonide 40 MG/ML injection                Primary Care Provider Office Phone # Fax #    Shanna Rodriguez [phone]       [address] [address]        Thank you!     Thank you for choosing [facility]  for your care. Our goal is always to provide you with excellent care. Hearing back from our patients is one way we can continue to improve our services. Please take a few minutes to complete the written survey that you may receive in the mail after your visit with us. Thank you!             Your Updated Medication List - Protect others around you: Learn how to safely use, store and throw away your medicines at " www.disposemymeds.org.          This list is accurate as of: 5/12/17  1:48 PM.  Always use your most recent med list.                   Brand Name Dispense Instructions for use    ASACOL 400 MG EC tablet   Generic drug:  mesalamine      4 TABLETS DAILY       benzoyl peroxide 5 % topical gel     60 g    Apply topically daily as needed       CALCIUM + D PO      Take  by mouth. ONCE DAILY.       citalopram 20 MG tablet    celeXA    90 tablet    Take 0.5-1 tablets (10-20 mg) by mouth daily       clotrimazole 1 % cream    LOTRIMIN    60 g    Apply topically 2 times daily       diclofenac 1 % Gel topical gel    VOLTAREN    100 g    Apply 4 grams to knees or 2 grams to hands four times daily using enclosed dosing card.       hydrochlorothiazide 25 MG tablet    HYDRODIURIL    90 tablet    Take 1 tablet (25 mg) by mouth daily       MULTIVITAMIN PO      Take  by mouth. ONCE DAILY.       triamcinolone 0.1 % cream    KENALOG    30 g    Apply topically 2 times daily       triamcinolone acetonide 40 MG/ML injection    KENALOG-40    1 mL    1 mL (40 mg) by INTRA-ARTICULAR route once for 1 dose       * UNABLE TO FIND      MEDICATION NAME: Cur Man Vitamin       * UNABLE TO FIND      MEDICATION NAME: Collagen vitamin       * Notice:  This list has 2 medication(s) that are the same as other medications prescribed for you. Read the directions carefully, and ask your doctor or other care provider to review them with you.

## 2017-05-12 NOTE — PROGRESS NOTES
Julia Tabares  :  1945  DOS: May 12, 2017  MRN: [id number]    Sports Medicine Clinic Visit - Interim History 2017    Initial Visit Date Visit date not found    PCP: Shanna Rodriguez    Julia Tabares is a 71 year old female who is seen in f/u up for Primary osteoarthritis of both knees. Since last visit on 2016, patient reports pain and swelling in her posterior left knee and calf, limiting her range of motion. No new injury.  She has had overall good relief from the synvisc injection 2016.    Symptoms are better with: nothing  Symptoms are worse with: flexion, unable to get to 90 degrees  Additional Features:   Positive: swellin   Negative: bruising, popping, grinding, catching, locking, paresthesias, numbness, weakness, pain in other joints and systemic symptoms    Social History:     Interim History - May 12, 2017  Since last visit on 2017 patient has mild-moderate left knee pain.  Synvisc One injection completed on 17 provided ~ 70 - 80% relief, continues to have discomfort in posterior knee.  Desiring to pursue baker's cyst aspiration as previously discussed.  No new injury in the interim.    Review of Systems  Skin: no bruising, yes swelling  Musculoskeletal: as above  Neurologic: no numbness, paresthesias  Remainder of review of systems is negative including constitutional, CV, pulmonary, GI, except as noted in HPI or medical history.    Patient's current problem list, past medical and surgical history, and family history were reviewed.    Patient Active Problem List   Diagnosis     Crohn's colitis (H)     Anxiety     Postmenopausal atrophic vaginitis     Advanced directives, counseling/discussion     Hyperlipidemia LDL goal <160     Left knee DJD     CTS (carpal tunnel syndrome)     Osteoporosis     Hypertension goal BP (blood pressure) < 140/90     Essential hypertension with goal blood pressure less than 140/90     Past Medical History:  "  Diagnosis Date     Anxiety      Crohn's colitis (H)      Past Surgical History:   Procedure Laterality Date     C/SECTION, LOW TRANSVERSE  x3    , Low Transverse     HC REDUCTION OF LARGE BREAST       Family History   Problem Relation Age of Onset     CANCER Mother      Hypertension Mother      HEART DISEASE Father      DIABETES No family hx of        Objective  /75  Ht 5' 3.5\" (1.613 m)  Wt 151 lb (68.5 kg)  BMI 26.33 kg/m2    GENERAL APPEARANCE: healthy, alert and no distress   GAIT: NORMAL  SKIN: no suspicious lesions or rashes  HEENT: Sclera clear, anicteric  CV: good peripheral pulses  RESP: Breathing not labored  NEURO: Normal strength and tone, mentation intact and speech normal  PSYCH:  mentation appears normal and affect normal/bright    Bilateral Knee exam    Inspection:      no effusion, swelling of bruising bilateral       Mild posterior swelling on the left with some fullness throughout the calf on that side    Patella:      Mobility -       hypomobile bilateral       Crepitus noted in the patellofemoral joint bilateral    Tender:      Posterior left knee    Non Tender:      remainder of knee area bilateral    Knee ROM:      Flexion 90 degrees left       Extension 5 degrees left    Strength:      5/5 with knee extension bilateral    Special Tests:     neg (-) Nia left       neg (-) anterior drawer left       neg (-) posterior drawer left       neg (-) varus at 0 and 30 left       neg (-) valgus at 0 and 30 left    Gait:      normal    Neurovascular:      2+ peripheral pulses bilaterally and brisk capillary refill       sensation grossly intact    Sports Medicine Clinic Procedure  Ultrasound Guided Left Popliteal Cyst Aspiration and Injection    Technique: The risks of the procedure were explained to the patient.  A consent was given for the popliteal cyst aspiration and injection.  The patient was evaluated with a [employer] ultrasound machine using a 12 MHz linear probe.     The " Left knee was prepped and draped in a sterile manner.  Ultrasound identification of the popliteal cyst, medial head of the gastrocnemius muscle and tendon, semimembranosis tendon and semitendinosis tendon in both long and short axis.  The location of adjacent neurovascular structures were noted and marked.  The probe was placed in long axis to the Leftknee.  A 1.5 inch 18 gauge needle was placed under ultrasound guidance into the popliteal Cyst.  7 ml's of straw colored fluid was aspirated from the cyst. 1 ml of kenalog (40mg/ml) was injected without difficulty on long axis view.  The needle was removed and there was good hemostasis without complications.  There was ultrasound documentation of needle placement and injection.     Radiology  None new    Assessment:  1. Baker's cyst, left    2. Primary osteoarthritis of both knees          Plan:  - Today's Plan of Care:  Ultrasound of the left lower extremity reviewed again  Injection and aspiration of baker's cyst  Received good relief from Synvisc injection but ongoing discomfort in back of leg interfering with activty  Immediate relief from drainage and anesthetic effect today, will follow clinically  Reviewed low impact exercise choices to build into her routine  F/u with Dr Harshil keyes  Expectations and goals of CSI reviewed  Often 2-3 days for steroid effect, and can take up to two weeks for maximum effect  We discussed modified progressive pain-free activity as tolerated  Do not overuse in first two weeks if feeling better due to concern for vulnerability while steroid is working  Supportive care reviewed  All questions were answered today  Contact us with additional questions or concerns  Signs and sx of concern reviewed      Shashi Solorzano DO, GERALD  Primary Care Sports Medicine  [facility]

## 2017-05-15 ENCOUNTER — OFFICE VISIT (OUTPATIENT)
Dept: ORTHOPEDICS | Facility: CLINIC | Age: 72
End: 2017-05-15
Payer: COMMERCIAL

## 2017-05-15 ENCOUNTER — RADIANT APPOINTMENT (OUTPATIENT)
Dept: GENERAL RADIOLOGY | Facility: CLINIC | Age: 72
End: 2017-05-15
Attending: ORTHOPAEDIC SURGERY
Payer: COMMERCIAL

## 2017-05-15 VITALS
HEIGHT: 64 IN | BODY MASS INDEX: 25.27 KG/M2 | WEIGHT: 148 LBS | OXYGEN SATURATION: 98 % | DIASTOLIC BLOOD PRESSURE: 70 MMHG | SYSTOLIC BLOOD PRESSURE: 136 MMHG | HEART RATE: 63 BPM

## 2017-05-15 DIAGNOSIS — M79.642 BILATERAL HAND PAIN: ICD-10-CM

## 2017-05-15 DIAGNOSIS — M18.0 PRIMARY OSTEOARTHRITIS OF BOTH FIRST CARPOMETACARPAL JOINTS: ICD-10-CM

## 2017-05-15 DIAGNOSIS — M79.641 BILATERAL HAND PAIN: ICD-10-CM

## 2017-05-15 DIAGNOSIS — G56.02 CARPAL TUNNEL SYNDROME OF LEFT WRIST: Primary | ICD-10-CM

## 2017-05-15 PROCEDURE — 99203 OFFICE O/P NEW LOW 30 MIN: CPT | Mod: 25 | Performed by: ORTHOPAEDIC SURGERY

## 2017-05-15 PROCEDURE — 20600 DRAIN/INJ JOINT/BURSA W/O US: CPT | Mod: LT | Performed by: ORTHOPAEDIC SURGERY

## 2017-05-15 PROCEDURE — 73130 X-RAY EXAM OF HAND: CPT | Mod: LT | Performed by: RADIOLOGY

## 2017-05-15 ASSESSMENT — PAIN SCALES - GENERAL: PAINLEVEL: MILD PAIN (3)
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
## 2017-05-15 NOTE — PATIENT INSTRUCTIONS
Thanks for coming today.  [facility]  [address] [place] [address]    To schedule future appointments in Ortho Clinic, you may call [phone].    To schedule ordered imaging by your provider:   Call Central Imaging [phone]    To schedule an injection ordered by your provider:  Call Central Imaging Injection scheduling line: [phone]  [facility] available online at:  [email or web address]    Please call if any further questions or concerns ([phone]).  Clinic hours 8 am to 5 pm.    Return to clinic (call) if symptoms worsen or fail to improve.

## 2017-05-15 NOTE — NURSING NOTE
"Julia Tabares's goals for this visit include: Evaluate bilateral thumb pain and evening finger numbness.  She requests these members of her care team be copied on today's visit information: Yes    PCP: Shanna Rodriguez    Referring Provider:  No referring provider defined for this encounter.    Chief Complaint   Patient presents with     Consult     Bilateral thumb pain and finger numbness.        Initial /70  Pulse 63  Ht 1.613 m (5' 3.5\")  Wt 67.1 kg (148 lb)  SpO2 98%  BMI 25.81 kg/m2 Estimated body mass index is 25.81 kg/(m^2) as calculated from the following:    Height as of this encounter: 1.613 m (5' 3.5\").    Weight as of this encounter: 67.1 kg (148 lb).  Medication Reconciliation: complete    "

## 2017-05-24 ENCOUNTER — TELEPHONE (OUTPATIENT)
Dept: FAMILY MEDICINE | Facility: CLINIC | Age: 72
End: 2017-05-24

## 2017-05-24 NOTE — TELEPHONE ENCOUNTER
Reason for Call: Request for an order or referral:    Order or referral being requested: Order  -Upper scope    Date needed: as soon as possible    Has the patient been seen by the PCP for this problem? YES    Additional comments: ASAP    Phone number Patient can be reached at:  Home number on file [phone] (home)    Best Time:  any    Can we leave a detailed message on this number?  YES    Call taken on 5/24/2017 at 3:13 PM by Zayda Woodruff

## 2017-05-24 NOTE — TELEPHONE ENCOUNTER
Going in for colonoscopy June 16th. She usually also has an upper endoscopy at the same time. She thinks she probably forgot to ask you about this. She is having a return of the symptoms that she had this done for before-(burning in her chest, dizziness that comes and goes-states that this not a heart attack and she had this before-but I advised her that she should go to the ER if these are persisting and progressing symptoms. She denied any other symptoms). She is having this done at [facility] [address], [facility]. Will you order?    Jeremy Murray RN

## 2017-05-30 NOTE — TELEPHONE ENCOUNTER
Reason for Call:  Other order    Detailed comments: Patient has colonoscopy scheduled for 6/16, would like to have upper checked out as well while in the colonoscopy. Please call to advise    Phone Number Patient can be reached at: Home number on file [phone] (home)    Best Time: anytime    Can we leave a detailed message on this number? YES    Call taken on 5/30/2017 at 4:06 PM by Jeremy Leary

## 2017-05-31 NOTE — TELEPHONE ENCOUNTER
Called and scheduled patient with PCP to first available I could find - 6/26/17.  Currently, colonoscopy is scheduled for 6/16 which means patient would need to change this to after 6/26 (she is hoping to add on upper GI per previous notes).     Dr. Rodriguez - Is there a time prior to 6/16 that you could see patient? Or keep as scheduled?    Thank you,  Luca Moreno RN

## 2017-05-31 NOTE — TELEPHONE ENCOUNTER
Called patient and changed appointment time.  She was very appreciative.  Reviewed warning signs and when to seek urgent medical attention.  Patient verbalized understanding.    Luca Moreno RN

## 2017-06-05 ENCOUNTER — OFFICE VISIT (OUTPATIENT)
Dept: FAMILY MEDICINE | Facility: CLINIC | Age: 72
End: 2017-06-05
Payer: COMMERCIAL

## 2017-06-05 VITALS
WEIGHT: 151.38 LBS | BODY MASS INDEX: 25.84 KG/M2 | TEMPERATURE: 98.6 F | SYSTOLIC BLOOD PRESSURE: 142 MMHG | OXYGEN SATURATION: 96 % | DIASTOLIC BLOOD PRESSURE: 76 MMHG | HEART RATE: 74 BPM | HEIGHT: 64 IN

## 2017-06-05 DIAGNOSIS — Z87.19 HISTORY OF GASTROESOPHAGEAL REFLUX (GERD): ICD-10-CM

## 2017-06-05 DIAGNOSIS — I10 ESSENTIAL HYPERTENSION WITH GOAL BLOOD PRESSURE LESS THAN 140/90: ICD-10-CM

## 2017-06-05 DIAGNOSIS — K50.10 CROHN'S COLITIS, WITHOUT COMPLICATIONS (H): ICD-10-CM

## 2017-06-05 DIAGNOSIS — R42 DIZZINESS: ICD-10-CM

## 2017-06-05 DIAGNOSIS — K44.9 HIATAL HERNIA: ICD-10-CM

## 2017-06-05 DIAGNOSIS — R53.83 OTHER FATIGUE: Primary | ICD-10-CM

## 2017-06-05 LAB
ALBUMIN SERPL-MCNC: 3.4 G/DL (ref 3.4–5)
ALP SERPL-CCNC: 66 U/L (ref 40–150)
ALT SERPL W P-5'-P-CCNC: 20 U/L (ref 0–50)
ANION GAP SERPL CALCULATED.3IONS-SCNC: 8 MMOL/L (ref 3–14)
AST SERPL W P-5'-P-CCNC: 17 U/L (ref 0–45)
BASOPHILS # BLD AUTO: 0.1 10E9/L (ref 0–0.2)
BASOPHILS NFR BLD AUTO: 0.9 %
BILIRUB SERPL-MCNC: 0.4 MG/DL (ref 0.2–1.3)
BUN SERPL-MCNC: 18 MG/DL (ref 7–30)
CALCIUM SERPL-MCNC: 9.3 MG/DL (ref 8.5–10.1)
CHLORIDE SERPL-SCNC: 104 MMOL/L (ref 94–109)
CO2 SERPL-SCNC: 32 MMOL/L (ref 20–32)
CREAT SERPL-MCNC: 0.67 MG/DL (ref 0.52–1.04)
CRP SERPL-MCNC: <2.9 MG/L (ref 0–8)
DIFFERENTIAL METHOD BLD: NORMAL
EOSINOPHIL # BLD AUTO: 0.3 10E9/L (ref 0–0.7)
EOSINOPHIL NFR BLD AUTO: 3.9 %
ERYTHROCYTE [DISTWIDTH] IN BLOOD BY AUTOMATED COUNT: 13 % (ref 10–15)
ERYTHROCYTE [SEDIMENTATION RATE] IN BLOOD BY WESTERGREN METHOD: 18 MM/H (ref 0–30)
GFR SERPL CREATININE-BSD FRML MDRD: 87 ML/MIN/1.7M2
GLUCOSE SERPL-MCNC: 95 MG/DL (ref 70–99)
HCT VFR BLD AUTO: 40.7 % (ref 35–47)
HGB BLD-MCNC: 13.2 G/DL (ref 11.7–15.7)
LYMPHOCYTES # BLD AUTO: 1.4 10E9/L (ref 0.8–5.3)
LYMPHOCYTES NFR BLD AUTO: 19.7 %
MCH RBC QN AUTO: 30.5 PG (ref 26.5–33)
MCHC RBC AUTO-ENTMCNC: 32.4 G/DL (ref 31.5–36.5)
MCV RBC AUTO: 94 FL (ref 78–100)
MONOCYTES # BLD AUTO: 0.7 10E9/L (ref 0–1.3)
MONOCYTES NFR BLD AUTO: 9.5 %
NEUTROPHILS # BLD AUTO: 4.6 10E9/L (ref 1.6–8.3)
NEUTROPHILS NFR BLD AUTO: 66 %
PLATELET # BLD AUTO: 320 10E9/L (ref 150–450)
POTASSIUM SERPL-SCNC: 3.8 MMOL/L (ref 3.4–5.3)
PROT SERPL-MCNC: 7.5 G/DL (ref 6.8–8.8)
RBC # BLD AUTO: 4.33 10E12/L (ref 3.8–5.2)
SODIUM SERPL-SCNC: 144 MMOL/L (ref 133–144)
TSH SERPL DL<=0.005 MIU/L-ACNC: 1.43 MU/L (ref 0.4–4)
WBC # BLD AUTO: 6.9 10E9/L (ref 4–11)

## 2017-06-05 PROCEDURE — 36415 COLL VENOUS BLD VENIPUNCTURE: CPT | Performed by: FAMILY MEDICINE

## 2017-06-05 PROCEDURE — 99214 OFFICE O/P EST MOD 30 MIN: CPT | Performed by: FAMILY MEDICINE

## 2017-06-05 PROCEDURE — 85025 COMPLETE CBC W/AUTO DIFF WBC: CPT | Performed by: FAMILY MEDICINE

## 2017-06-05 PROCEDURE — 80053 COMPREHEN METABOLIC PANEL: CPT | Performed by: FAMILY MEDICINE

## 2017-06-05 PROCEDURE — 84443 ASSAY THYROID STIM HORMONE: CPT | Performed by: FAMILY MEDICINE

## 2017-06-05 PROCEDURE — 93000 ELECTROCARDIOGRAM COMPLETE: CPT | Performed by: FAMILY MEDICINE

## 2017-06-05 PROCEDURE — 86140 C-REACTIVE PROTEIN: CPT | Performed by: FAMILY MEDICINE

## 2017-06-05 PROCEDURE — 85652 RBC SED RATE AUTOMATED: CPT | Performed by: FAMILY MEDICINE

## 2017-06-05 NOTE — PATIENT INSTRUCTIONS
- Schedule a consultation appointment with the sleep specialist to discuss your chronic fatigue.     [facility]   Discharged by : Harmony WHITMAN MA    If you have any questions regarding your visit please contact your care team:     [facility] Hours Telephone Number   [address] Dr., Dr.   7am-7pm Monday - Thursday   7am-5pm Fridays  [phone]   (Appointment scheduling available 24/7)   RN Line   [phone] option 2       For a Price Quote for your services, please call our Consumer Price Line at [phone].     What options do I have for visits at the clinic other than the traditional office visit?     To expand how we care for you, many of our providers are utilizing electronic visits (e-visits) and telephone visits, when medically appropriate, for interactions with their patients rather than a visit in the clinic. We also offer nurse visits for many medical concerns. Just like any other service, we will bill your insurance company for this type of visit based on time spent on the phone with your provider. Not all insurance companies cover these visits. Please check with your medical insurance if this type of visit is covered. You will be responsible for any charges that are not paid by your insurance.   E-visits via [employer]: generally incur a $35.00 fee.     Telephone visits:   Time spent on the phone: *charged based on time that is spent on the phone in increments of 10 minutes. Estimated cost:   5-10 mins $30.00   11-20 mins. $59.00   21-30 mins. $85.00     Use [employer] (secure email communication and access to your chart) to send your primary care provider a message or make an appointment. Ask someone on your Team how to sign up for [employer].     As always, Thank you for trusting us with your health care needs!      [facility]:    Scheduling Appointments  Jessi Black  [facility]  Call: [phone]    [facility]  Call: [phone]    [facility]  Call: [phone]      WHERE TO GO FOR CARE?    Clinic    Make an appointment if you:       Are sick (cold, cough, flu, sore throat, earache or in pain).       Have a small injury (sprain, small cut, burn or broken bone).       Need a physical exam, Pap smear, vaccine or prescription refill.       Have questions about your health or medicines.    To reach us:      Call [phone] ([phone]). Open 24 hours every day. (For counseling services, call [phone].)    Log into [employer] at [employer]. (Visit [employer] to create an account.) Hospital emergency room    An emergency is a serious or life- threatening problem that must be treated right away.    Call 654 or get to the hospital if you have:      Very bad or sudden:            - Chest pain or pressure         - Bleeding         - Head or belly pain         - Dizziness or trouble seeing, walking or                          Speaking      Problems breathing      Blood in your vomit or you are coughing up blood      A major injury (knocked out, loss of a finger or limb, rape, broken bone protruding from skin)    A mental health crisis. (Or call the Mental Health Crisis line at 1-187.555.6585 or Suicide Prevention Hotline at 1-988.294.4325.)    Open 24 hours every day. You don't need an appointment.     Urgent care    Visit urgent care for sickness or small injuries when the clinic is closed. You don't need an appointment. To check hours or find an urgent care near you, visit [email or web address]. Online care    Get online care from [facility] for more than 70 common problems, like colds, allergies and infections. Open 24 hours every day at:   [email or web address]   Need help deciding?    For advice about where to be seen, you may call your clinic and ask to speak with a nurse. We're here for you 24 hours every day.          If you are deaf or hard of hearing, please let us know. We provide many free services including sign language interpreters, oral interpreters, TTYs, telephone amplifiers, note takers and written materials.

## 2017-06-05 NOTE — PROGRESS NOTES
SUBJECTIVE:                                                    Julia Tabares is a 71 year old female who presents to clinic today for the following health issues:      Dizziness     Onset: On and off over the last year but not very often,     Had one recent episode of dizziness came with last episode of reflux.    Description:   Do you feel faint:  no but gets lightheaded  Does it feel like the surroundings (bed, room) are moving: no   Unsteady/off balance: no   Have you passed out or fallen: no     Intensity: mild    Progression of Symptoms:  waxing and waning    Accompanying Signs & Symptoms:  Heart palpitations: YES- with reflux  Nausea, vomiting: no   Weakness in arms or legs: no   Fatigue: no   Vision or speech changes: no   Ringing in ears (Tinnitus): no   Hearing Loss: no    History:   Head trauma/concussion hx: no   Previous similar symptoms: no   Recent bleeding history: no     Precipitating factors:   Worse with activity or head movement: no   Any new medications (BP?): no   Alcohol/drug abuse/withdrawal: no     Alleviating factors:   Does staying in a fixed position give relief:  no        Therapies Tried and outcome: none  GERD/Heartburn     Onset: Off and on for a year    Description:     Burning in chest: YES. Pressure heavy feeling in chest    Intensity: moderate    Progression of Symptoms: waxing and waning    Accompanying Signs & Symptoms:  Does it feel like food gets stuck: no   Nausea: no  Vomiting (bloody?): no  Abdominal Pain: no   Black-Tarry stools: no :  Bloody stools: no    History:   Previous ulcers: no    Precipitating factors:   Caffeine use: YES- drinks 2 coffee a day  Alcohol use: no  NSAID/Aspirin use: YES- once in awhile  Tobacco use: no  Worse with none.    Alleviating factors:  none         Therapies Tried and outcome:none  She explains that she only had one episode of lightheadedness with heartburn. She recalls that this was preceded by her taking vitamins and medications for  the day. When it started, she sat down and the feeling started to go away. This sensation was similar to an episode she has had in the past. She describes the pain like heartburn, but when it happens she will breathe faster and deeper to help with the pain. She has not had any other chest symptoms since this started. The last time she had a colonoscopy, an upper GI endoscopy was also performed, so she thinks she should have another one with her colonoscopy next week. The results from her last upper GI endoscopy indicated that she has a hiatal hernia.       HTN F/U - She has been taking her BP medications as instructed. She is surprised that it was high today.       Fatigue - She relates that she has been feeling more tired recently. This is more a feeling of low motivation rather than drowsiness. She reports that her colitis is under good control, and she doesn't really have any bowel symptoms. Her diet is high in protein, to help with energy. She has a good sleep every night, but wakes up feeling tired and as if she could sleep longer. She denies snoring. Her  has told her that she will make small gasping/breathing noises, but this is rare. She tends to take daily naps throughout the day because she is so tired. She is not taking Advil or Ibuprofen.       Problem list and histories reviewed & adjusted, as indicated.  Additional history: as documented    Patient Active Problem List   Diagnosis     Crohn's colitis (H)     Anxiety     Postmenopausal atrophic vaginitis     Advanced directives, counseling/discussion     Hyperlipidemia LDL goal <160     Left knee DJD     CTS (carpal tunnel syndrome)     Osteoporosis     Hypertension goal BP (blood pressure) < 140/90     Essential hypertension with goal blood pressure less than 140/90     History of gastroesophageal reflux (GERD)     Hiatal hernia     Past Surgical History:   Procedure Laterality Date     C/SECTION, LOW TRANSVERSE  x3    , Low Transverse  "    HC REDUCTION OF LARGE BREAST         Social History   Substance Use Topics     Smoking status: Former Smoker     Quit date: 10/13/1992     Smokeless tobacco: Never Used     Alcohol use No     Family History   Problem Relation Age of Onset     CANCER Mother      Hypertension Mother      HEART DISEASE Father      DIABETES No family hx of          Labs reviewed in EPIC    Reviewed and updated as needed this visit by clinical staff  Tobacco  Allergies  Med Hx  Surg Hx  Fam Hx  Soc Hx      Reviewed and updated as needed this visit by Provider       ROS:  Constitutional, HEENT, cardiovascular, pulmonary, GI, , musculoskeletal, neuro, skin, endocrine and psych systems are negative, except as otherwise noted.    This document serves as a record of the services and decisions personally performed and made by Shanna Brown MD. It was created on his/her behalf by Christine Ryder, a trained medical scribe. The creation of this document is based the provider's statements to the medical scribe.    Scribmahesh Ryder 2:20 PM, June 5, 2017    OBJECTIVE:                                                    /76  Pulse 74  Temp 98.6  F (37  C) (Oral)  Ht 5' 3.5\" (1.613 m)  Wt 151 lb 6 oz (68.7 kg)  SpO2 96%  BMI 26.39 kg/m2  Body mass index is 26.39 kg/(m^2).  GENERAL: healthy, alert and no distress,   NECK: no adenopathy, no asymmetry, masses, or scars and thyroid normal to palpation  RESP: lungs clear to auscultation - no rales, rhonchi or wheezes  CV: regular rate and rhythm, normal S1 S2, no S3 or S4, no murmur, click or rub, no peripheral edema and peripheral pulses strong  ABDOMEN: soft, nontender, no hepatosplenomegaly, no masses and bowel sounds normal  PSYCH: mentation appears normal, affect normal/bright    Diagnostic Test Results:  Results for orders placed or performed in visit on 06/05/17 (from the past 24 hour(s))   CBC with platelets differential   Result Value Ref Range    WBC 6.9 4.0 - " 11.0 10e9/L    RBC Count 4.33 3.8 - 5.2 10e12/L    Hemoglobin 13.2 11.7 - 15.7 g/dL    Hematocrit 40.7 35.0 - 47.0 %    MCV 94 78 - 100 fl    MCH 30.5 26.5 - 33.0 pg    MCHC 32.4 31.5 - 36.5 g/dL    RDW 13.0 10.0 - 15.0 %    Platelet Count 320 150 - 450 10e9/L    Diff Method Automated Method     % Neutrophils 66.0 %    % Lymphocytes 19.7 %    % Monocytes 9.5 %    % Eosinophils 3.9 %    % Basophils 0.9 %    Absolute Neutrophil 4.6 1.6 - 8.3 10e9/L    Absolute Lymphocytes 1.4 0.8 - 5.3 10e9/L    Absolute Monocytes 0.7 0.0 - 1.3 10e9/L    Absolute Eosinophils 0.3 0.0 - 0.7 10e9/L    Absolute Basophils 0.1 0.0 - 0.2 10e9/L   ESR: Erythrocyte sedimentation rate   Result Value Ref Range    Sed Rate 18 0 - 30 mm/h   CRP, inflammation   Result Value Ref Range    CRP Inflammation <2.9 0.0 - 8.0 mg/L   Comprehensive metabolic panel (BMP + Alb, Alk Phos, ALT, AST, Total. Bili, TP)   Result Value Ref Range    Sodium 144 133 - 144 mmol/L    Potassium 3.8 3.4 - 5.3 mmol/L    Chloride 104 94 - 109 mmol/L    Carbon Dioxide 32 20 - 32 mmol/L    Anion Gap 8 3 - 14 mmol/L    Glucose 95 70 - 99 mg/dL    Urea Nitrogen 18 7 - 30 mg/dL    Creatinine 0.67 0.52 - 1.04 mg/dL    GFR Estimate 87 >60 mL/min/1.7m2    GFR Estimate If Black >90   GFR Calc   >60 mL/min/1.7m2    Calcium 9.3 8.5 - 10.1 mg/dL    Bilirubin Total 0.4 0.2 - 1.3 mg/dL    Albumin 3.4 3.4 - 5.0 g/dL    Protein Total 7.5 6.8 - 8.8 g/dL    Alkaline Phosphatase 66 40 - 150 U/L    ALT 20 0 - 50 U/L    AST 17 0 - 45 U/L   TSH with free T4 reflex   Result Value Ref Range    TSH 1.43 0.40 - 4.00 mU/L     Results for orders placed or performed in visit on 06/05/17   CBC with platelets differential   Result Value Ref Range    WBC 6.9 4.0 - 11.0 10e9/L    RBC Count 4.33 3.8 - 5.2 10e12/L    Hemoglobin 13.2 11.7 - 15.7 g/dL    Hematocrit 40.7 35.0 - 47.0 %    MCV 94 78 - 100 fl    MCH 30.5 26.5 - 33.0 pg    MCHC 32.4 31.5 - 36.5 g/dL    RDW 13.0 10.0 - 15.0 %     Platelet Count 320 150 - 450 10e9/L    Diff Method Automated Method     % Neutrophils 66.0 %    % Lymphocytes 19.7 %    % Monocytes 9.5 %    % Eosinophils 3.9 %    % Basophils 0.9 %    Absolute Neutrophil 4.6 1.6 - 8.3 10e9/L    Absolute Lymphocytes 1.4 0.8 - 5.3 10e9/L    Absolute Monocytes 0.7 0.0 - 1.3 10e9/L    Absolute Eosinophils 0.3 0.0 - 0.7 10e9/L    Absolute Basophils 0.1 0.0 - 0.2 10e9/L   ESR: Erythrocyte sedimentation rate   Result Value Ref Range    Sed Rate 18 0 - 30 mm/h   CRP, inflammation   Result Value Ref Range    CRP Inflammation <2.9 0.0 - 8.0 mg/L   Comprehensive metabolic panel (BMP + Alb, Alk Phos, ALT, AST, Total. Bili, TP)   Result Value Ref Range    Sodium 144 133 - 144 mmol/L    Potassium 3.8 3.4 - 5.3 mmol/L    Chloride 104 94 - 109 mmol/L    Carbon Dioxide 32 20 - 32 mmol/L    Anion Gap 8 3 - 14 mmol/L    Glucose 95 70 - 99 mg/dL    Urea Nitrogen 18 7 - 30 mg/dL    Creatinine 0.67 0.52 - 1.04 mg/dL    GFR Estimate 87 >60 mL/min/1.7m2    GFR Estimate If Black >90   GFR Calc   >60 mL/min/1.7m2    Calcium 9.3 8.5 - 10.1 mg/dL    Bilirubin Total 0.4 0.2 - 1.3 mg/dL    Albumin 3.4 3.4 - 5.0 g/dL    Protein Total 7.5 6.8 - 8.8 g/dL    Alkaline Phosphatase 66 40 - 150 U/L    ALT 20 0 - 50 U/L    AST 17 0 - 45 U/L   TSH with free T4 reflex   Result Value Ref Range    TSH 1.43 0.40 - 4.00 mU/L        ASSESSMENT/PLAN:                                                    1. Other fatigue   Patient will schedule an appointment with sleep specialist after she gets her colonoscopy and EGD done  - SLEEP EVALUATION & MANAGEMENT REFERRAL - ADULT; Future  - CBC with platelets differential  - ESR: Erythrocyte sedimentation rate  - CRP, inflammation  - Comprehensive metabolic panel (BMP + Alb, Alk Phos, ALT, AST, Total. Bili, TP)  - TSH with free T4 reflex    2. Crohn's colitis, without complications (H)  Followed by GI, hasn't seen them in many years.  Discussed this could be connected to  her fatigue   - GASTROENTEROLOGY ADULT REF PROCEDURE ONLY  - ESR: Erythrocyte sedimentation rate  - CRP, inflammation    3. Dizziness  Had one episode, will need to monitor for recurrent symptom and determine if holter or other testing needed   - EKG 12-lead complete w/read - Clinics    4. Essential hypertension with goal blood pressure less than 140/90  Borderline control today.  Reviewed that we need to talk about additional medication. She feels it is high due to being in clinic  Recommend outpatient blood pressure recheck     5. History of gastroesophageal reflux (GERD)  Reviewed EGD from 2011   - GASTROENTEROLOGY ADULT REF PROCEDURE ONLY    6. Hiatal hernia  Reviewed EGD from 2011  Reviewed what hiatal hernia is and symptoms it can have  - GASTROENTEROLOGY ADULT REF PROCEDURE ONLY       Patient Instructions     - Schedule a consultation appointment with the sleep specialist to discuss your chronic fatigue.       The information in this document, created by the medical scribe for me, accurately reflects the services I personally performed and the decisions made by me. I have reviewed and approved this document for accuracy prior to leaving the patient care area.  Shanna Rodriguez MD  2:21 PM, 06/05/17     Shanna Rodriguez MD  [facility]

## 2017-06-05 NOTE — MR AVS SNAPSHOT
After Visit Summary   6/5/2017    Julia Tabares    MRN: [id number]           Patient Information     Date Of Birth          1945        Visit Information        Provider Department      6/5/2017 2:00 PM Shanna Rodriguez MD [facility]        Today's Diagnoses     Other fatigue    -  1    Crohn's colitis, without complications (H)        Dizziness        Essential hypertension with goal blood pressure less than 140/90        History of gastroesophageal reflux (GERD)        Hiatal hernia          Care Instructions    - Schedule a consultation appointment with the sleep specialist to discuss your chronic fatigue.     [facility]   Discharged by : Harmony WHITMAN MA    If you have any questions regarding your visit please contact your care team:     [facility] Clinic Hours Telephone Number   [address] Dr., Dr.   7am-7pm Monday - Thursday   7am-5pm Fridays  [phone]   (Appointment scheduling available 24/7)   RN Line   [phone] option 2       For a Price Quote for your services, please call our Consumer Price Line at [phone].     What options do I have for visits at the clinic other than the traditional office visit?     To expand how we care for you, many of our providers are utilizing electronic visits (e-visits) and telephone visits, when medically appropriate, for interactions with their patients rather than a visit in the clinic. We also offer nurse visits for many medical concerns. Just like any other service, we will bill your insurance company for this type of visit based on time spent on the phone with your provider. Not all insurance companies cover these visits. Please check with your medical insurance if this type of visit is covered. You will be responsible for any charges that are not paid by your insurance.   E-visits via [employer]Orbit: generally incur a $35.00  fee.     Telephone visits:   Time spent on the phone: *charged based on time that is spent on the phone in increments of 10 minutes. Estimated cost:   5-10 mins $30.00   11-20 mins. $59.00   21-30 mins. $85.00     Use [employer] (secure email communication and access to your chart) to send your primary care provider a message or make an appointment. Ask someone on your Team how to sign up for [employer].     As always, Thank you for trusting us with your health care needs!      [facility]:    Scheduling Appointments  [place], [facility]  Call: [phone]    [facility], [place], [facility]  Call: [phone]    [facility]  Call: [phone]      WHERE TO GO FOR CARE?    Clinic    Make an appointment if you:       Are sick (cold, cough, flu, sore throat, earache or in pain).       Have a small injury (sprain, small cut, burn or broken bone).       Need a physical exam, Pap smear, vaccine or prescription refill.       Have questions about your health or medicines.    To reach us:      Call [phone] ([phone]). Open 24 hours every day. (For counseling services, call [phone].)    Log into Chippmunk at [email or web address]. (Visit [email or web address] to create an account.) Hospital emergency room    An emergency is a serious or life- threatening problem that must be treated right away.    Call [phone] or get to the hospital if you have:      Very bad or sudden:            - Chest pain or pressure         - Bleeding         - Head or belly pain         - Dizziness or trouble seeing, walking or                          Speaking      Problems breathing      Blood in your vomit or you are coughing up blood      A major injury (knocked out, loss of a finger or limb, rape, broken bone protruding from skin)    A mental health crisis. (Or call the Mental Health Crisis line at 1-720.438.3425 or Suicide Prevention Hotline at 1-178.992.2537.)    Open 24  hours every day. You don't need an appointment.     Urgent care    Visit urgent care for sickness or small injuries when the clinic is closed. You don't need an appointment. To check hours or find an urgent care near you, visit [email or web address]. Online care    Get online care from [facility] for more than 70 common problems, like colds, allergies and infections. Open 24 hours every day at:   [email or web address]   Need help deciding?    For advice about where to be seen, you may call your clinic and ask to speak with a nurse. We're here for you 24 hours every day.         If you are deaf or hard of hearing, please let us know. We provide many free services including sign language interpreters, oral interpreters, TTYs, telephone amplifiers, note takers and written materials.                         Follow-ups after your visit        Additional Services     GASTROENTEROLOGY ADULT REF PROCEDURE ONLY       Last Lab Result: Creatinine (mg/dL)       Date                     Value                 01/04/2017               0.82             ----------  Body mass index is 26.39 kg/(m^2).     Needed:  No  Language:  English    Patient will be contacted to schedule procedure.     Please be aware that coverage of these services is subject to the terms and limitations of your health insurance plan.  Call member services at your health plan with any benefit or coverage questions.  Any procedures must be performed at a [facility] facility OR coordinated by your clinic's referral office.    Please bring the following with you to your appointment:    (1) Any X-Rays, CTs or MRIs which have been performed.  Contact the facility where they were done to arrange for  prior to your scheduled appointment.    (2) List of current medications   (3) This referral request   (4) Any documents/labs given to you for this referral            SLEEP EVALUATION & MANAGEMENT REFERRAL - ADULT       Please be aware that coverage of these services is  subject to the terms and limitations of your health insurance plan.  Call member services at your health plan with any benefit or coverage questions.      Please bring the following to your appointment:    >>   List of current medications   >>   This referral request   >>   Any documents/labs given to you for this referral    [facility] Arevalo  [phone] (Age 15 and up)                  Future tests that were ordered for you today     Open Future Orders        Priority Expected Expires Ordered    SLEEP EVALUATION & MANAGEMENT REFERRAL - ADULT Routine  6/5/2018 6/5/2017            Who to contact     If you have questions or need follow up information about today's clinic visit or your schedule please contact [facility] directly at [phone].  Normal or non-critical lab and imaging results will be communicated to you by MyChart, letter or phone within 4 business days after the clinic has received the results. If you do not hear from us within 7 days, please contact the clinic through [employer] or phone. If you have a critical or abnormal lab result, we will notify you by phone as soon as possible.  Submit refill requests through Reflektion or call your pharmacy and they will forward the refill request to us. Please allow 3 business days for your refill to be completed.          Additional Information About Your Visit        MyChart Information     Ref[employer]ktion gives you secure access to your electronic health record. If you see a primary care provider, you can also send messages to your care team and make appointments. If you have questions, please call your primary care clinic.  If you do not have a primary care provider, please call [phone] and they will assist you.        Care EveryWhere ID     This is your Care EveryWhere ID. This could be used by other organizations to access your [facility] medical records  [id number]        Your Vitals Were     Pulse Temperature Height  "Pulse Oximetry BMI (Body Mass Index)       74 98.6  F (37  C) (Oral) 5' 3.5\" (1.613 m) 96% 26.39 kg/m2        Blood Pressure from Last 3 Encounters:   06/05/17 142/76   05/15/17 136/70   05/12/17 120/75    Weight from Last 3 Encounters:   06/05/17 151 lb 6 oz (68.7 kg)   05/15/17 148 lb (67.1 kg)   05/12/17 151 lb (68.5 kg)              We Performed the Following     CBC with platelets differential     Comprehensive metabolic panel (BMP + Alb, Alk Phos, ALT, AST, Total. Bili, TP)     CRP, inflammation     EKG 12-lead complete w/read - Clinics     ESR: Erythrocyte sedimentation rate     GASTROENTEROLOGY ADULT REF PROCEDURE ONLY     TSH with free T4 reflex        Primary Care Provider Office Phone # Fax #    Shanna Rodriguez [phone]       [address] [address]        Thank you!     Thank you for choosing [facility]  for your care. Our goal is always to provide you with excellent care. Hearing back from our patients is one way we can continue to improve our services. Please take a few minutes to complete the written survey that you may receive in the mail after your visit with us. Thank you!             Your Updated Medication List - Protect others around you: Learn how to safely use, store and throw away your medicines at www.disposemymeds.org.          This list is accurate as of: 6/5/17  3:02 PM.  Always use your most recent med list.                   Brand Name Dispense Instructions for use    ASACOL 400 MG EC tablet   Generic drug:  mesalamine      4 TABLETS DAILY       benzoyl peroxide 5 % topical gel     60 g    Apply topically daily as needed       CALCIUM + D PO      Take  by mouth. ONCE DAILY.       citalopram 20 MG tablet    celeXA    90 tablet    Take 0.5-1 tablets (10-20 mg) by mouth daily       clotrimazole 1 % cream    LOTRIMIN    60 g    Apply topically 2 times daily       diclofenac 1 % Gel topical gel    " VOLTAREN    100 g    Apply 4 grams to knees or 2 grams to hands four times daily using enclosed dosing card.       hydrochlorothiazide 25 MG tablet    HYDRODIURIL    90 tablet    Take 1 tablet (25 mg) by mouth daily       MULTIVITAMIN PO      Take  by mouth. ONCE DAILY.       triamcinolone 0.1 % cream    KENALOG    30 g    Apply topically 2 times daily       * UNABLE TO FIND      MEDICATION NAME: Cur Man Vitamin       * UNABLE TO FIND      MEDICATION NAME: Collagen vitamin       * Notice:  This list has 2 medication(s) that are the same as other medications prescribed for you. Read the directions carefully, and ask your doctor or other care provider to review them with you.

## 2017-06-05 NOTE — NURSING NOTE
"Chief Complaint   Patient presents with     Gastrophageal Reflux     Dizziness       Initial /84 (BP Location: Right arm, Cuff Size: Adult Regular)  Pulse 74  Temp 98.6  F (37  C) (Oral)  Ht 5' 3.5\" (1.613 m)  Wt 151 lb 6 oz (68.7 kg)  SpO2 96%  BMI 26.39 kg/m2 Estimated body mass index is 26.39 kg/(m^2) as calculated from the following:    Height as of this encounter: 5' 3.5\" (1.613 m).    Weight as of this encounter: 151 lb 6 oz (68.7 kg).  Medication Reconciliation: rosalva LEMUS, Certified Medical Assistant (AAMA)June 5, 2017 2:10 PM      "

## 2017-07-21 ENCOUNTER — TRANSFERRED RECORDS (OUTPATIENT)
Dept: HEALTH INFORMATION MANAGEMENT | Facility: CLINIC | Age: 72
End: 2017-07-21

## 2017-08-28 ENCOUNTER — OFFICE VISIT (OUTPATIENT)
Dept: FAMILY MEDICINE | Facility: CLINIC | Age: 72
End: 2017-08-28
Payer: COMMERCIAL

## 2017-08-28 VITALS
SYSTOLIC BLOOD PRESSURE: 134 MMHG | DIASTOLIC BLOOD PRESSURE: 66 MMHG | HEART RATE: 70 BPM | HEIGHT: 64 IN | WEIGHT: 145.13 LBS | TEMPERATURE: 98.7 F | BODY MASS INDEX: 24.78 KG/M2

## 2017-08-28 DIAGNOSIS — I10 ESSENTIAL HYPERTENSION WITH GOAL BLOOD PRESSURE LESS THAN 140/90: Primary | ICD-10-CM

## 2017-08-28 DIAGNOSIS — R53.83 OTHER FATIGUE: ICD-10-CM

## 2017-08-28 DIAGNOSIS — E78.5 HYPERLIPIDEMIA LDL GOAL <160: ICD-10-CM

## 2017-08-28 PROCEDURE — 99214 OFFICE O/P EST MOD 30 MIN: CPT | Performed by: FAMILY MEDICINE

## 2017-08-28 ASSESSMENT — ANXIETY QUESTIONNAIRES
2. NOT BEING ABLE TO STOP OR CONTROL WORRYING: SEVERAL DAYS
7. FEELING AFRAID AS IF SOMETHING AWFUL MIGHT HAPPEN: NOT AT ALL
GAD7 TOTAL SCORE: 4
IF YOU CHECKED OFF ANY PROBLEMS ON THIS QUESTIONNAIRE, HOW DIFFICULT HAVE THESE PROBLEMS MADE IT FOR YOU TO DO YOUR WORK, TAKE CARE OF THINGS AT HOME, OR GET ALONG WITH OTHER PEOPLE: SOMEWHAT DIFFICULT
6. BECOMING EASILY ANNOYED OR IRRITABLE: SEVERAL DAYS
5. BEING SO RESTLESS THAT IT IS HARD TO SIT STILL: NOT AT ALL
1. FEELING NERVOUS, ANXIOUS, OR ON EDGE: SEVERAL DAYS
3. WORRYING TOO MUCH ABOUT DIFFERENT THINGS: SEVERAL DAYS

## 2017-08-28 ASSESSMENT — PATIENT HEALTH QUESTIONNAIRE - PHQ9
5. POOR APPETITE OR OVEREATING: NOT AT ALL
SUM OF ALL RESPONSES TO PHQ QUESTIONS 1-9: 3

## 2017-08-28 NOTE — PATIENT INSTRUCTIONS
1. Trial 1/2 tablet of  Citalopram in the morning instead of at night. Mychart me in a few weeks on how you feel.     2. Your blood pressure looks great today! No changes to blood pressure medications.     [facility]   Discharged by : Rasheeda Robins MA  Paper scripts provided to patient : no     If you have any questions regarding your visit please contact your care team:     [facility] Clinic Hours Telephone Number   Dr. Hazel Espinal   7am-7pm Monday - Thursday   7am-5pm Fridays  [phone]   (Appointment scheduling available 24/7)   RN Line   [phone] option 2       For a Price Quote for your services, please call our Consumer Price Line at [phone].     What options do I have for visits at the clinic other than the traditional office visit?     To expand how we care for you, many of our providers are utilizing electronic visits (e-visits) and telephone visits, when medically appropriate, for interactions with their patients rather than a visit in the clinic. We also offer nurse visits for many medical concerns. Just like any other service, we will bill your insurance company for this type of visit based on time spent on the phone with your provider. Not all insurance companies cover these visits. Please check with your medical insurance if this type of visit is covered. You will be responsible for any charges that are not paid by your insurance.   E-visits via Pure KlimaschutzharImonomi: generally incur a $35.00 fee.     Telephone visits:   Time spent on the phone: *charged based on time that is spent on the phone in increments of 10 minutes. Estimated cost:   5-10 mins $30.00   11-20 mins. $59.00   21-30 mins. $85.00     Use Intellicytt (secure email communication and access to your chart) to send your primary care provider a message or make an appointment. Ask someone on your Team how to sign up for Voltaic Coatings.     As always, Thank you for  trusting us with your health care needs!      [facility]:    Scheduling Appointments  Jessi [facility]  Call: [phone]    [facility]  Call: [phone]    [facility]  Call: [phone]      WHERE TO GO FOR CARE?    Clinic    Make an appointment if you:       Are sick (cold, cough, flu, sore throat, earache or in pain).       Have a small injury (sprain, small cut, burn or broken bone).       Need a physical exam, Pap smear, vaccine or prescription refill.       Have questions about your health or medicines.    To reach us:      Call [phone] ([phone]). Open 24 hours every day. (For counseling services, call [phone].)    Log into WebEvents at Mozio. (Visit [email or web address] to create an account.) Hospital emergency room    An emergency is a serious or life- threatening problem that must be treated right away.    Call 353 or get to the hospital if you have:      Very bad or sudden:            - Chest pain or pressure         - Bleeding         - Head or belly pain         - Dizziness or trouble seeing, walking or                          Speaking      Problems breathing      Blood in your vomit or you are coughing up blood      A major injury (knocked out, loss of a finger or limb, rape, broken bone protruding from skin)    A mental health crisis. (Or call the Mental Health Crisis line at 1-890.142.6433 or Suicide Prevention Hotline at 1-351.146.8803.)    Open 24 hours every day. You don't need an appointment.     Urgent care    Visit urgent care for sickness or small injuries when the clinic is closed. You don't need an appointment. To check hours or find an urgent care near you, visit [email or web address]. Online care    Get online care from [facility] for more than 70 common problems, like colds, allergies and infections. Open 24 hours every day at:   [email or web address]   Need help deciding?    For  advice about where to be seen, you may call your clinic and ask to speak with a nurse. We're here for you 24 hours every day.         If you are deaf or hard of hearing, please let us know. We provide many free services including sign language interpreters, oral interpreters, TTYs, telephone amplifiers, note takers and written materials.

## 2017-08-28 NOTE — PROGRESS NOTES
SUBJECTIVE:   Julia Tabares is a 71 year old female who presents to clinic today for the following health issues:      Hypertension Follow-up      Outpatient blood pressures are being checked at home.  Results are 130's/60's.    Low Salt Diet: low salt        Amount of exercise or physical activity: 6-7 days/week for an average of greater than 60 minutes    Problems taking medications regularly: No    Medication side effects: none  Diet: low salt, no added salt      Wants to discuss fatigue again.  She states she doesn't snore.  She didn't feel like she needed to go to sleep clinic.        Fatigue - Patient states that she usually experiences her fatigues in the mornings and feels like not getting out of bed. She reports that she has been experiencing stress from family activities and work. She notes that she is a light sleeper and was wondering if that could be the cause of her fatigue or due to her iron levels. The patient states that she tries to get 8 hours of sleep at night and she takes a nap for about and hour during the day. She reports that she is able to relax sometimes during the day. She is very hyperactive and requires urgency with her activities, but lately has been feeling limited because of fatigue. The patient reports that she has decided to take a day off every week from stressful activities in order to rest.       GI - Patient continues to take her mesalamine 400 MG daily for colitis as needed. She reports that she feels her nerves are responsible for colitis flare-ups. She notes that she is not using Zantac or Prilosec medication for the erthyma on her stomach. She is UTD on her colonoscopy.         Problem list and histories reviewed & adjusted, as indicated.  Additional history: as documented    Patient Active Problem List   Diagnosis     Crohn's colitis (H)     Anxiety     Postmenopausal atrophic vaginitis     Advanced directives, counseling/discussion     Hyperlipidemia LDL goal <160      Left knee DJD     CTS (carpal tunnel syndrome)     Osteoporosis     Hypertension goal BP (blood pressure) < 140/90     Essential hypertension with goal blood pressure less than 140/90     History of gastroesophageal reflux (GERD)     Hiatal hernia     Past Surgical History:   Procedure Laterality Date     C/SECTION, LOW TRANSVERSE  x3    , Low Transverse     HC REDUCTION OF LARGE BREAST         Social History   Substance Use Topics     Smoking status: Former Smoker     Quit date: 10/13/1992     Smokeless tobacco: Never Used     Alcohol use No     Family History   Problem Relation Age of Onset     CANCER Mother      Hypertension Mother      HEART DISEASE Father      DIABETES No family hx of          Current Outpatient Prescriptions   Medication Sig Dispense Refill     triamcinolone (KENALOG) 0.1 % cream Apply topically 2 times daily 30 g 1     hydrochlorothiazide (HYDRODIURIL) 25 MG tablet Take 1 tablet (25 mg) by mouth daily 90 tablet 3     clotrimazole (LOTRIMIN) 1 % cream Apply topically 2 times daily 60 g 1     citalopram (CELEXA) 20 MG tablet Take 0.5-1 tablets (10-20 mg) by mouth daily 90 tablet 3     benzoyl peroxide 5 % topical gel Apply topically daily as needed 60 g 1     UNABLE TO FIND MEDICATION NAME: Cur Man Vitamin       UNABLE TO FIND MEDICATION NAME: Collagen vitamin       diclofenac (VOLTAREN) 1 % GEL Apply 4 grams to knees or 2 grams to hands four times daily using enclosed dosing card. 100 g 1     Calcium-Vitamin D (CALCIUM + D PO) Take  by mouth. ONCE DAILY.        Multiple Vitamin (MULTIVITAMIN OR) Take  by mouth. ONCE DAILY.        mesalamine (ASACOL) 400 MG EC tablet 4 TABLETS DAILY       Labs reviewed in EPIC      Reviewed and updated as needed this visit by clinical staff       Reviewed and updated as needed this visit by Provider         ROS:  Constitutional, HEENT, cardiovascular, pulmonary, GI, , musculoskeletal, neuro, skin, endocrine and psych systems are negative, except as  "otherwise noted.    This document serves as a record of the services and decisions personally performed and made by Shanna Brown MD. It was created on his/her behalf by Arsenio Zarate, a trained medical scribe. The creation of this document is based the provider's statements to the medical scribe.    Scribmahesh Zarate  1:21 PM, August 28, 2017  OBJECTIVE:   /66 (BP Location: Right arm, Patient Position: Sitting, Cuff Size: Adult Regular)  Pulse 70  Temp 98.7  F (37.1  C) (Oral)  Ht 1.613 m (5' 3.5\")  Wt 65.8 kg (145 lb 2 oz)  BMI 25.3 kg/m2  Body mass index is 25.3 kg/(m^2).  GENERAL: healthy, alert and no distress  RESP: lungs clear to auscultation - no rales, rhonchi or wheezes  CV: regular rate and rhythm, normal S1 S2, no S3 or S4, no murmur, click or rub, no peripheral edema and peripheral pulses strong  PSYCH: mentation appears normal, affect normal/bright    Diagnostic Test Results:  Results for orders placed or performed in visit on 06/05/17   CBC with platelets differential   Result Value Ref Range    WBC 6.9 4.0 - 11.0 10e9/L    RBC Count 4.33 3.8 - 5.2 10e12/L    Hemoglobin 13.2 11.7 - 15.7 g/dL    Hematocrit 40.7 35.0 - 47.0 %    MCV 94 78 - 100 fl    MCH 30.5 26.5 - 33.0 pg    MCHC 32.4 31.5 - 36.5 g/dL    RDW 13.0 10.0 - 15.0 %    Platelet Count 320 150 - 450 10e9/L    Diff Method Automated Method     % Neutrophils 66.0 %    % Lymphocytes 19.7 %    % Monocytes 9.5 %    % Eosinophils 3.9 %    % Basophils 0.9 %    Absolute Neutrophil 4.6 1.6 - 8.3 10e9/L    Absolute Lymphocytes 1.4 0.8 - 5.3 10e9/L    Absolute Monocytes 0.7 0.0 - 1.3 10e9/L    Absolute Eosinophils 0.3 0.0 - 0.7 10e9/L    Absolute Basophils 0.1 0.0 - 0.2 10e9/L   ESR: Erythrocyte sedimentation rate   Result Value Ref Range    Sed Rate 18 0 - 30 mm/h   CRP, inflammation   Result Value Ref Range    CRP Inflammation <2.9 0.0 - 8.0 mg/L   Comprehensive metabolic panel (BMP + Alb, Alk Phos, ALT, AST, Total. Bili, TP) "   Result Value Ref Range    Sodium 144 133 - 144 mmol/L    Potassium 3.8 3.4 - 5.3 mmol/L    Chloride 104 94 - 109 mmol/L    Carbon Dioxide 32 20 - 32 mmol/L    Anion Gap 8 3 - 14 mmol/L    Glucose 95 70 - 99 mg/dL    Urea Nitrogen 18 7 - 30 mg/dL    Creatinine 0.67 0.52 - 1.04 mg/dL    GFR Estimate 87 >60 mL/min/1.7m2    GFR Estimate If Black >90   GFR Calc   >60 mL/min/1.7m2    Calcium 9.3 8.5 - 10.1 mg/dL    Bilirubin Total 0.4 0.2 - 1.3 mg/dL    Albumin 3.4 3.4 - 5.0 g/dL    Protein Total 7.5 6.8 - 8.8 g/dL    Alkaline Phosphatase 66 40 - 150 U/L    ALT 20 0 - 50 U/L    AST 17 0 - 45 U/L   TSH with free T4 reflex   Result Value Ref Range    TSH 1.43 0.40 - 4.00 mU/L       ASSESSMENT/PLAN:   1. Essential hypertension with goal blood pressure less than 140/90  Well controlled, on HCTZ 25 MG daily. Continue medication  without changes.   Chronic, stable, well controlled, continue current medication, refill done if needed      2. Other fatigue  Suspects this is associated with nighttime dose of  SSRI medication (citalopram). Will trial 1/2 tablet of  Citalopram in the morning instead of at night  Next step is to consider a different SSRI for anxiety  Discussed self care, boundaries when helping with grand kids, stress management    3. Hyperlipidemia LDL goal <160    - **Lipid panel reflex to direct LDL FUTURE anytime; Future    FUTURE APPOINTMENTS:       - Follow-up visit in 3 months     Shanna Rodriguez MD  [facility]  The information in this document, created by the medical scribe for me, accurately reflects the services I personally performed and the decisions made by me. I have reviewed and approved this document for accuracy prior to leaving the patient care area.  Shanna Rodriguez MD  1:21 PM, 08/28/17

## 2017-08-28 NOTE — MR AVS SNAPSHOT
After Visit Summary   8/28/2017    Julia Tabares    MRN: [id number]           Patient Information     Date Of Birth          1945        Visit Information        Provider Department      8/28/2017 1:00 PM Shanna Rodriguez MD [facility]        Today's Diagnoses     Essential hypertension with goal blood pressure less than 140/90    -  1    Other fatigue        Hyperlipidemia LDL goal <160          Care Instructions    1. Trial 1/2 tablet of  Citalopram in the morning instead of at night. Mychart me in a few weeks on how you feel.     2. Your blood pressure looks great today! No changes to blood pressure medications.     [facility]   Discharged by : Rasheeda Robins MA  Paper scripts provided to patient : no     If you have any questions regarding your visit please contact your care team:     [facility] Clinic Hours Telephone Number   Dr. Hazel Espinal   7am-7pm Monday - Thursday   7am-5pm Fridays  [phone]   (Appointment scheduling available 24/7)   RN Line   [phone] option 2       For a Price Quote for your services, please call our Consumer Price Line at [phone].     What options do I have for visits at the clinic other than the traditional office visit?     To expand how we care for you, many of our providers are utilizing electronic visits (e-visits) and telephone visits, when medically appropriate, for interactions with their patients rather than a visit in the clinic. We also offer nurse visits for many medical concerns. Just like any other service, we will bill your insurance company for this type of visit based on time spent on the phone with your provider. Not all insurance companies cover these visits. Please check with your medical insurance if this type of visit is covered. You will be responsible for any charges that are not paid by your insurance.   E-visits via  Calpanohart: generally incur a $35.00 fee.     Telephone visits:   Time spent on the phone: *charged based on time that is spent on the phone in increments of 10 minutes. Estimated cost:   5-10 mins $30.00   11-20 mins. $59.00   21-30 mins. $85.00     Use Calpanohart (secure email communication and access to your chart) to send your primary care provider a message or make an appointment. Ask someone on your Team how to sign up for Relayrt.     As always, Thank you for trusting us with your health care needs!      [facility]:    Scheduling Appointments  Jessi Black [facility]  Call: [phone]    [facility]  Call: [phone]    [facility]  Call: [phone]      WHERE TO GO FOR CARE?    Clinic    Make an appointment if you:       Are sick (cold, cough, flu, sore throat, earache or in pain).       Have a small injury (sprain, small cut, burn or broken bone).       Need a physical exam, Pap smear, vaccine or prescription refill.       Have questions about your health or medicines.    To reach us:      Call [phone] ([phone]). Open 24 hours every day. (For counseling services, call [phone].)    Log into DialedIN at [email or web address]. (Visit [email or web address] to create an account.) Hospital emergency room    An emergency is a serious or life- threatening problem that must be treated right away.    Call 122 or get to the hospital if you have:      Very bad or sudden:            - Chest pain or pressure         - Bleeding         - Head or belly pain         - Dizziness or trouble seeing, walking or                          Speaking      Problems breathing      Blood in your vomit or you are coughing up blood      A major injury (knocked out, loss of a finger or limb, rape, broken bone protruding from skin)    A mental health crisis. (Or call the Mental Health Crisis line at 1-278.752.9909 or Suicide Prevention Hotline  at 1-985.362.4548.)    Open 24 hours every day. You don't need an appointment.     Urgent care    Visit urgent care for sickness or small injuries when the clinic is closed. You don't need an appointment. To check hours or find an urgent care near you, visit [email or web address]. Online care    Get [facility] from [facility] for more than 70 common problems, like colds, allergies and infections. Open 24 hours every day at:   [email or web address]   Need help deciding?    For advice about where to be seen, you may call your clinic and ask to speak with a nurse. We're here for you 24 hours every day.         If you are deaf or hard of hearing, please let us know. We provide many free services including sign language interpreters, oral interpreters, TTYs, telephone amplifiers, note takers and written materials.                         Follow-ups after your visit        Your next 10 appointments already scheduled     Sep 07, 2017  3:00 PM CDT   New Visit with Sumeet White DO   [facility] ([facility])    [address] [place] [id number] [phone]              Future tests that were ordered for you today     Open Future Orders        Priority Expected Expires Ordered    **Lipid panel reflex to direct LDL FUTURE anytime Routine 8/28/2017 8/28/2018 8/28/2017            Who to contact     If you have questions or need follow up information about today's clinic visit or your schedule please contact [facility] directly at [phone].  Normal or non-critical lab and imaging results will be communicated to you by MyChart, letter or phone within 4 business days after the clinic has received the results. If you do not hear from us within 7 days, please contact the clinic through MyChart or phone. If you have a critical or abnormal lab result, we will notify you by phone as soon as possible.  Submit refill requests through xzoops or call your pharmacy and they will  "forward the refill request to us. Please allow 3 business days for your refill to be completed.          Additional Information About Your Visit        High Throughput Genomicshart Information     [facility] gives you secure access to your electronic health record. If you see a primary care provider, you can also send messages to your care team and make appointments. If you have questions, please call your primary care clinic.  If you do not have a primary care provider, please call [phone] and they will assist you.        Care EveryWhere ID     This is your Care EveryWhere ID. This could be used by other organizations to access your [place] medical records  [id number]        Your Vitals Were     Pulse Temperature Height BMI (Body Mass Index)          70 98.7  F (37.1  C) (Oral) 5' 3.5\" (1.613 m) 25.3 kg/m2         Blood Pressure from Last 3 Encounters:   08/28/17 134/66   06/05/17 142/76   05/15/17 136/70    Weight from Last 3 Encounters:   08/28/17 145 lb 2 oz (65.8 kg)   06/05/17 151 lb 6 oz (68.7 kg)   05/15/17 148 lb (67.1 kg)               Primary Care Provider Office Phone # Fax #    Shanna Rodriguez [phone]       [facility] [address]        Equal Access to Services     CLARISSA DEVRIES : Hadii aad ku hadasho Soomaali, waaxda luqadaha, qaybta kaalmada adeegyada, waxay idiin hayaan jose etienne . So [facility] [phone].    ATENCIÓN: Si habla español, tiene a dudley disposición servicios gratuitos de asistencia lingüística. Llame al [phone].    We comply with applicable federal civil rights laws and [place] laws. We do not discriminate on the basis of race, color, national origin, age, disability sex, sexual orientation or gender identity.            Thank you!     Thank you for choosing [facility]  for your care. Our goal is always to provide you with excellent care. Hearing back from our patients is one way we can continue to improve our services. Please " take a few minutes to complete the written survey that you may receive in the mail after your visit with us. Thank you!             Your Updated Medication List - Protect others around you: Learn how to safely use, store and throw away your medicines at www.disposemymeds.org.          This list is accurate as of: 8/28/17  1:47 PM.  Always use your most recent med list.                   Brand Name Dispense Instructions for use Diagnosis    ASACOL 400 MG EC tablet   Generic drug:  mesalamine      4 TABLETS DAILY        benzoyl peroxide 5 % topical gel     60 g    Apply topically daily as needed    Acne, unspecified acne type       CALCIUM + D PO      Take  by mouth. ONCE DAILY.        citalopram 20 MG tablet    celeXA    90 tablet    Take 0.5-1 tablets (10-20 mg) by mouth daily    Anxiety       clotrimazole 1 % cream    LOTRIMIN    60 g    Apply topically 2 times daily    Skin rash       diclofenac 1 % Gel topical gel    VOLTAREN    100 g    Apply 4 grams to knees or 2 grams to hands four times daily using enclosed dosing card.    Knee pain, right       hydrochlorothiazide 25 MG tablet    HYDRODIURIL    90 tablet    Take 1 tablet (25 mg) by mouth daily    Essential hypertension with goal blood pressure less than 140/90       MULTIVITAMIN PO      Take  by mouth. ONCE DAILY.        triamcinolone 0.1 % cream    KENALOG    30 g    Apply topically 2 times daily    Skin rash       * UNABLE TO FIND      MEDICATION NAME: Cur Man Vitamin        * UNABLE TO FIND      MEDICATION NAME: Collagen vitamin        * Notice:  This list has 2 medication(s) that are the same as other medications prescribed for you. Read the directions carefully, and ask your doctor or other care provider to review them with you.

## 2017-08-28 NOTE — NURSING NOTE
"Chief Complaint   Patient presents with     Hypertension     Fatigue       Initial /66 (BP Location: Right arm, Patient Position: Sitting, Cuff Size: Adult Regular)  Pulse 70  Temp 98.7  F (37.1  C) (Oral)  Ht 5' 3.5\" (1.613 m)  Wt 145 lb 2 oz (65.8 kg)  BMI 25.3 kg/m2 Estimated body mass index is 25.3 kg/(m^2) as calculated from the following:    Height as of this encounter: 5' 3.5\" (1.613 m).    Weight as of this encounter: 145 lb 2 oz (65.8 kg).  Medication Reconciliation: rosalva LEMUS, Certified Medical Assistant (AAMA)August 28, 2017 1:06 PM      "

## 2017-08-29 ASSESSMENT — ANXIETY QUESTIONNAIRES: GAD7 TOTAL SCORE: 4

## 2017-09-05 DIAGNOSIS — E78.5 HYPERLIPIDEMIA LDL GOAL <160: ICD-10-CM

## 2017-09-05 PROCEDURE — 36415 COLL VENOUS BLD VENIPUNCTURE: CPT | Performed by: FAMILY MEDICINE

## 2017-09-05 PROCEDURE — 80061 LIPID PANEL: CPT | Performed by: FAMILY MEDICINE

## 2017-09-06 LAB
CHOLEST SERPL-MCNC: 231 MG/DL
HDLC SERPL-MCNC: 66 MG/DL
LDLC SERPL CALC-MCNC: 151 MG/DL
NONHDLC SERPL-MCNC: 165 MG/DL
TRIGL SERPL-MCNC: 70 MG/DL

## 2017-09-07 ENCOUNTER — OFFICE VISIT (OUTPATIENT)
Dept: ORTHOPEDICS | Facility: CLINIC | Age: 72
End: 2017-09-07
Payer: COMMERCIAL

## 2017-09-07 VITALS — DIASTOLIC BLOOD PRESSURE: 78 MMHG | OXYGEN SATURATION: 98 % | SYSTOLIC BLOOD PRESSURE: 130 MMHG | HEART RATE: 84 BPM

## 2017-09-07 DIAGNOSIS — M18.0 PRIMARY OSTEOARTHRITIS OF BOTH FIRST CARPOMETACARPAL JOINTS: Primary | ICD-10-CM

## 2017-09-07 PROCEDURE — 20604 DRAIN/INJ JOINT/BURSA W/US: CPT | Mod: 50 | Performed by: FAMILY MEDICINE

## 2017-09-07 PROCEDURE — 99207 ZZC NO CHARGE LOS: CPT | Performed by: FAMILY MEDICINE

## 2017-09-07 RX ORDER — METHYLPREDNISOLONE ACETATE 40 MG/ML
20 INJECTION, SUSPENSION INTRA-ARTICULAR; INTRALESIONAL; INTRAMUSCULAR; SOFT TISSUE ONCE
Qty: 0.5 ML | Refills: 0 | OUTPATIENT
Start: 2017-09-07 | End: 2017-09-07

## 2017-09-07 ASSESSMENT — PAIN SCALES - GENERAL: PAINLEVEL: EXTREME PAIN (8)

## 2017-09-07 NOTE — MR AVS SNAPSHOT
After Visit Summary   2017    Julia Tabares    MRN: [id number]           Patient Information     Date Of Birth          1945        Visit Information        Provider Department      2017 3:00 PM Sumeet White, DO [facility]        Today's Diagnoses     Primary osteoarthritis of both first carpometacarpal joints    -  1      Care Instructions    Thanks for coming today.  Ortho/Sports Medicine Clinic  [address] [place] [address]    To schedule future appointments in Ortho Clinic, you may call [phone].    To schedule ordered imaging by your provider:   Call Central Imaging [phone]    To schedule an injection ordered by your provider:  Call Central Imaging Injection scheduling line: [phone]  Wowcracy available online at:  [email or web address]    Please call if any further questions or concerns ([phone]).  Clinic hours 8 am to 5 pm.    Return to clinic (call) if symptoms worsen or fail to improve.            Follow-ups after your visit        Future tests that were ordered for you today     Open Future Orders        Priority Expected Expires Ordered    US Guided Needle Placement Routine  2018            Who to contact     If you have questions or need follow up information about today's clinic visit or your schedule please contact [facility] directly at [phone].  Normal or non-critical lab and imaging results will be communicated to you by MyChart, letter or phone within 4 business days after the clinic has received the results. If you do not hear from us within 7 days, please contact the clinic through MyChart or phone. If you have a critical or abnormal lab result, we will notify you by phone as soon as possible.  Submit refill requests through Wowcracy or call your pharmacy and they will forward the refill request to us. Please allow 3 business days for your refill to be completed.           Additional Information About Your Visit        [employer] Information     rag & bone gives you secure access to your electronic health record. If you see a primary care provider, you can also send messages to your care team and make appointments. If you have questions, please call your primary care clinic.  If you do not have a primary care provider, please call [phone] and they will assist you.      rag & bone is an electronic gateway that provides easy, online access to your medical records. With rag & bone, you can request a clinic appointment, read your test results, renew a prescription or communicate with your care team.     To access your existing account, please contact your [facility] or call [phone] for assistance.        Care EveryWhere ID     This is your Care EveryWhere ID. This could be used by other organizations to access your [place] medical records  [id number]        Your Vitals Were     Pulse Pulse Oximetry                84 98%           Blood Pressure from Last 3 Encounters:   09/07/17 130/78   08/28/17 134/66   06/05/17 142/76    Weight from Last 3 Encounters:   08/28/17 65.8 kg (145 lb 2 oz)   06/05/17 68.7 kg (151 lb 6 oz)   05/15/17 67.1 kg (148 lb)              We Performed the Following     HC ARTHROCNT ASPIR&/INJ SMALL JT/BURSAW/US REC RPRT          Today's Medication Changes          These changes are accurate as of: 9/7/17  3:45 PM.  If you have any questions, ask your nurse or doctor.               Start taking these medicines.        Dose/Directions    * methylPREDNISolone acetate 40 MG/ML injection   Commonly known as:  DEPO-MEDROL   Used for:  Primary osteoarthritis of both first carpometacarpal joints        Dose:  20 mg   0.5 mLs (20 mg) by INTRA-ARTICULAR route once for 1 dose   Quantity:  0.5 mL   Refills:  0       * methylPREDNISolone acetate 40 MG/ML injection   Commonly known as:  DEPO-MEDROL   Used for:  Primary osteoarthritis of both  first carpometacarpal joints        Dose:  20 mg   0.5 mLs (20 mg) by INTRA-ARTICULAR route once for 1 dose   Quantity:  0.5 mL   Refills:  0       * Notice:  This list has 2 medication(s) that are the same as other medications prescribed for you. Read the directions carefully, and ask your doctor or other care provider to review them with you.         Where to get your medicines      Some of these will need a paper prescription and others can be bought over the counter.  Ask your nurse if you have questions.     You don't need a prescription for these medications     methylPREDNISolone acetate 40 MG/ML injection    methylPREDNISolone acetate 40 MG/ML injection                Primary Care Provider Office Phone # Fax #    Shanna Rodriguez [phone]       [address] [place] [id number]        Equal Access to Services     CLARISSA DEVRIES : Linsey fry Soabby, waaxda luqadaha, qaybta kaalmada adesavageyapancho, sunshine cabral. So [facility] [phone].    ATENCIÓN: Si habla español, tiene a dudley disposición servicios gratuitos de asistencia lingüística. Llame al [phone].    We comply with applicable federal civil rights laws and [place] laws. We do not discriminate on the basis of race, color, national origin, age, disability sex, sexual orientation or gender identity.            Thank you!     Thank you for choosing [facility]  for your care. Our goal is always to provide you with excellent care. Hearing back from our patients is one way we can continue to improve our services. Please take a few minutes to complete the written survey that you may receive in the mail after your visit with us. Thank you!             Your Updated Medication List - Protect others around you: Learn how to safely use, store and throw away your medicines at www.disposemymeds.org.          This list is accurate as of: 9/7/17  3:45 PM.  Always use your most recent  med list.                   Brand Name Dispense Instructions for use Diagnosis    ASACOL 400 MG EC tablet   Generic drug:  mesalamine      4 TABLETS DAILY        benzoyl peroxide 5 % topical gel     60 g    Apply topically daily as needed    Acne, unspecified acne type       CALCIUM + D PO      Take  by mouth. ONCE DAILY.        citalopram 20 MG tablet    celeXA    90 tablet    Take 0.5-1 tablets (10-20 mg) by mouth daily    Anxiety       clotrimazole 1 % cream    LOTRIMIN    60 g    Apply topically 2 times daily    Skin rash       diclofenac 1 % Gel topical gel    VOLTAREN    100 g    Apply 4 grams to knees or 2 grams to hands four times daily using enclosed dosing card.    Knee pain, right       hydrochlorothiazide 25 MG tablet    HYDRODIURIL    90 tablet    Take 1 tablet (25 mg) by mouth daily    Essential hypertension with goal blood pressure less than 140/90       * methylPREDNISolone acetate 40 MG/ML injection    DEPO-MEDROL    0.5 mL    0.5 mLs (20 mg) by INTRA-ARTICULAR route once for 1 dose    Primary osteoarthritis of both first carpometacarpal joints       * methylPREDNISolone acetate 40 MG/ML injection    DEPO-MEDROL    0.5 mL    0.5 mLs (20 mg) by INTRA-ARTICULAR route once for 1 dose    Primary osteoarthritis of both first carpometacarpal joints       MULTIVITAMIN PO      Take  by mouth. ONCE DAILY.        triamcinolone 0.1 % cream    KENALOG    30 g    Apply topically 2 times daily    Skin rash       * UNABLE TO FIND      MEDICATION NAME: Cur Man Vitamin        * UNABLE TO FIND      MEDICATION NAME: Collagen vitamin        * Notice:  This list has 4 medication(s) that are the same as other medications prescribed for you. Read the directions carefully, and ask your doctor or other care provider to review them with you.

## 2017-09-07 NOTE — PROGRESS NOTES
Sara Dumont,       Your lab results show high cholesterol.   I will want to talk more about this with you at your next visit.    The below information is a cardiac risk score and helps determine which patients would benefit from cholesterol lowering medication.  We consider statin therapy when the 10 year risk score is 7.5 % or higher.     The 10-year ASCVD risk score ([place] ROLLINS Jr, et al., 2013) is: 10.4%    Values used to calculate the score:      Age: 71 years      Sex: Female      Is Non- : No      Diabetic: No      Tobacco smoker: No      Systolic Blood Pressure: 134 mmHg      Is BP treated: No      HDL Cholesterol: 66 mg/dL      Total Cholesterol: 231 mg/dL    Shanna Rodriguez MD

## 2017-09-07 NOTE — PATIENT INSTRUCTIONS
Thanks for coming today.  Ortho/Sports Medicine Clinic  [address] [place] [address]    To schedule future appointments in Ortho Clinic, you may call [phone].    To schedule ordered imaging by your provider:   Call Central Imaging [phone]    To schedule an injection ordered by your provider:  Call Central Imaging Injection scheduling line: [phone]  Narrat[employer]art available online at:  [email or web address]    Please call if any further questions or concerns ([phone]).  Clinic hours 8 am to 5 pm.    Return to clinic (call) if symptoms worsen or fail to improve.

## 2017-09-07 NOTE — PROGRESS NOTES
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  She's here for bilateral CMC injections.    Vitals:    09/07/17 1455   BP: 130/78   BP Location: Right arm   Patient Position: Chair   Cuff Size: Adult Regular   Pulse: 84   SpO2: 98%       CMC Injection, Bilateral - Ultrasound Guided  The patient was informed of the risks and the benefits of the procedure and a written consent was signed.  The patient s right thumb was prepped with chlorhexidine in sterile fashion.   20 mg of methylprednisolone suspension was drawn up into a 3 mL syringe with 0.5 mL of 1% lidocaine and 0.5 mL of 0.5% marcaine.  Injection was performed using sterile technique.  Under ultrasound guidance a 5/8-inch 25-gauge needle was used to enter the CMC joint of the right thumb.  Needle placement was visualized and documented with ultrasound.  Needle placed in short axis to the probe.  Ultrasound visualization was necessary due to decreased joint space in the setting of osteoarthritis.  Images were permanently stored for the patient's record.  The patient s left thumb was prepped with chlorhexidine in sterile fashion.   20 mg of methylprednisolone suspension was drawn up into a 3 mL syringe with 0.5 mL of 1% lidocaine and 0.5 mL of 0.5% marcaine.  Injection was performed using sterile technique.  Under ultrasound guidance a 5/8-inch 25-gauge needle was used to enter the CMC joint of the left thumb.  Needle placement was visualized and documented with ultrasound.  Needle placed in short axis to the probe.  Ultrasound visualization was necessary due to decreased joint space in the setting of osteoarthritis.  Images were permanently stored for the patient's record.  There were no complications. The patient tolerated the procedure well. There was negligible bleeding.   The patient was instructed to ice the thumb upon leaving clinic and refrain from overuse over the next 3 days.   The patient was instructed to call or go to the emergency room with any unusual  pain, swelling, redness, or if otherwise concerned.  Kenalog: NDC [id number]      Terrell White DO [facility]

## 2017-09-07 NOTE — NURSING NOTE
"Julia Raysa's goals for this visit include: Bilateral CMC injections  She requests these members of her care team be copied on today's visit information: yes    PCP: Shanna Rodriguez    Referring Provider:  No referring provider defined for this encounter.    Chief Complaint   Patient presents with     RECHECK     Patient is requesting bilateral CMC injections. Last injection done 05/15/2017 with Dr. Marie       Initial /78 (BP Location: Right arm, Patient Position: Chair, Cuff Size: Adult Regular)  Pulse 84  SpO2 98% Estimated body mass index is 25.3 kg/(m^2) as calculated from the following:    Height as of 8/28/17: 1.613 m (5' 3.5\").    Weight as of 8/28/17: 65.8 kg (145 lb 2 oz).  Medication Reconciliation: complete    "

## 2017-10-06 ENCOUNTER — TELEPHONE (OUTPATIENT)
Dept: FAMILY MEDICINE | Facility: CLINIC | Age: 72
End: 2017-10-06

## 2017-10-06 NOTE — TELEPHONE ENCOUNTER
Googled provider & called [facility] at [phone] & Dr. Leavitt is currently employed. Their fax number is [phone].  Left a detailed VM with [facility] with my call back number.   Trina Card RN

## 2017-10-06 NOTE — TELEPHONE ENCOUNTER
Reason for Call:  Other prescription    Detailed comments: Patient said that she was prescribed a medication balsalazide from a Dr Rosie Leavitt [facility] specialist and unable to locate her information could provider please send a script to [facility]'s pharmacy     Phone Number Patient can be reached at: Home number on file [phone] (home)    Best Time:     Can we leave a detailed message on this number? Not Applicable    Call taken on 10/6/2017 at 11:46 AM by Edith Breaux

## 2017-11-03 ENCOUNTER — OFFICE VISIT (OUTPATIENT)
Dept: ORTHOPEDICS | Facility: CLINIC | Age: 72
End: 2017-11-03
Payer: COMMERCIAL

## 2017-11-03 DIAGNOSIS — M17.0 PRIMARY OSTEOARTHRITIS OF BOTH KNEES: Primary | ICD-10-CM

## 2017-11-03 PROCEDURE — 99213 OFFICE O/P EST LOW 20 MIN: CPT | Mod: 25 | Performed by: FAMILY MEDICINE

## 2017-11-03 PROCEDURE — 20611 DRAIN/INJ JOINT/BURSA W/US: CPT | Mod: 50 | Performed by: FAMILY MEDICINE

## 2017-11-03 NOTE — LETTER
11/3/2017         RE: Julia Tabares  [address] [facility] [address]        Dear Colleague,    Thank you for referring your patient, Julia Tabares, to the [facility]. Please see a copy of my visit note below.    Julia Tabares  :  1945  DOS: November 3, 2017  MRN: [id number]    Sports Medicine Clinic Visit - Interim History 2017    Initial Visit Date Visit date not found    PCP: Shanna Rodriguez    Julia Tabares is a 71 year old female who is seen in f/u up for Primary osteoarthritis of both knees. Since last visit on 2016, patient reports pain and swelling in her posterior left knee and calf, limiting her range of motion. No new injury.  She has had overall good relief from the synvisc injection 2016.    Symptoms are better with: nothing  Symptoms are worse with: flexion, unable to get to 90 degrees  Additional Features:   Positive: swellin   Negative: bruising, popping, grinding, catching, locking, paresthesias, numbness, weakness, pain in other joints and systemic symptoms    Social History:     Interim History - May 12, 2017  Since last visit on 2017 patient has mild-moderate left knee pain.  Synvisc One injection completed on 17 provided ~ 70 - 80% relief, continues to have discomfort in posterior knee.  Desiring to pursue baker's cyst aspiration as previously discussed.  No new injury in the interim.    Interim History - November 3, 2017  Since last visit on 2017 patient has moderate bilateral knee pain, left>>>right.  Bilateral knee synvisc one injection completed on 17 relief provided 80 - 90%  relief for ~ 4.5 months.  Left knee baker's cyst aspiration provided excellent relief.  No new injury in the interim.    Review of Systems  Skin: no bruising, yes swelling  Musculoskeletal: as above  Neurologic: no numbness, paresthesias  Remainder of review of systems is negative including  constitutional, CV, pulmonary, GI, except as noted in HPI or medical history.    Patient's current problem list, past medical and surgical history, and family history were reviewed.    Patient Active Problem List   Diagnosis     Crohn's colitis (H)     Anxiety     Postmenopausal atrophic vaginitis     Advanced directives, counseling/discussion     Hyperlipidemia LDL goal <160     Left knee DJD     CTS (carpal tunnel syndrome)     Osteoporosis     Hypertension goal BP (blood pressure) < 140/90     Essential hypertension with goal blood pressure less than 140/90     History of gastroesophageal reflux (GERD)     Hiatal hernia     Past Medical History:   Diagnosis Date     Anxiety      Crohn's colitis (H)      Past Surgical History:   Procedure Laterality Date     C/SECTION, LOW TRANSVERSE  x3    , Low Transverse     HC REDUCTION OF LARGE BREAST       Family History   Problem Relation Age of Onset     CANCER Mother      Hypertension Mother      HEART DISEASE Father      DIABETES No family hx of        Objective  There were no vitals taken for this visit.    GENERAL APPEARANCE: healthy, alert and no distress   GAIT: NORMAL  SKIN: no suspicious lesions or rashes  HEENT: Sclera clear, anicteric  CV: good peripheral pulses  RESP: Breathing not labored  NEURO: Normal strength and tone, mentation intact and speech normal  PSYCH:  mentation appears normal and affect normal/bright    Bilateral Knee exam    Inspection:      no effusion, swelling of bruising bilateral       Mild posterior swelling on the left with some fullness throughout the calf on that side    Patella:      Mobility -       hypomobile bilateral       Crepitus noted in the patellofemoral joint bilateral    Tender:      Posterior left knee    Non Tender:      remainder of knee area bilateral    Knee ROM:      Flexion 90 degrees left       Extension 5 degrees left    Strength:      5/5 with knee extension bilateral    Special Tests:     neg (-) Nia  left       neg (-) anterior drawer left       neg (-) posterior drawer left       neg (-) varus at 0 and 30 left       neg (-) valgus at 0 and 30 left    Gait:      normal    Neurovascular:      2+ peripheral pulses bilaterally and brisk capillary refill       sensation grossly intact    Sports Medicine Clinic Procedure  Ultrasound Guided Bilateral Intra-Articular Knee SynviscOne Injection  Technique: The risks of the procedure were explained to the patient.  A consent was signed for the intra-articular knee procedure.  The patient was evaluated with a [employer] ultrasound machine using a 12 MHz linear probe.   The Bilateral knee was prepped and draped in a sterile manner.  Ultrasound identification of the patella, suprapatellar pouch, quadriceps tendon and femur in both long and short axis. The probe was placed in long axis to the Bilateral femur.  A 2 inch 21 gauge needle was placed under ultrasound guidance into the superior knee joint.  20 ml of straw colored fluid was aspirated from right, none from left.   6ml of SynviscOne (48 units) into the bilateral knee. The needle was removed and there was good hemostasis without complications.  There was ultrasound documentation of needle placement and injection.    Radiology  None new    Assessment:  1. Primary osteoarthritis of both knees        Plan:  - Today's Plan of Care:  F/u prn  No ongoing pain after aspiration and injection of baker's cyst  Received good relief from Synvisc injection previously, repeated today  F/u with Dr hCua or myself prn  Reviewed wt loss, activity modification and progressive increase in activity as tolerated and guided by pain  Reviewed options for potential steroid vs viscosupplementation injections and the possibility for future orthopedic referral prn  Reviewed safe and appropriate OTC medication choices, try tylenol first  Up to 3000mg daily of tylenol is generally safe, NSAID dosing and duration limitations reviewed  Discussed  nature of degenerative arthrosis of the knee.   Discussed symptom treatment with ice or heat, topical treatments, and rest if needed.   Expectations and limitations of synvisc were reviewed in detail  Often 4-6 weeks before full effect may be noticed  Usually covered up to every 6 months by insurance, but does not need to be repeated unless pain returns, at which point we would re-evaluate  Potential use of CSI in future for flares of pain reviewed in detail  Encouraged modified progressive pain-free activity as tolerated  HEP and Supportive care reviewed  All questions were answered today  Contact us with additional questions or concerns  Signs and sx of concern reviewed      Shashi Solorzano DO, CAQ  Primary Care Sports Medicine  [facility]         Again, thank you for allowing me to participate in the care of your patient.        Sincerely,        Shashi Solorzano DO

## 2017-11-03 NOTE — PROGRESS NOTES
Julia Tabares  :  1945  DOS: November 3, 2017  MRN: [id number]    Sports Medicine Clinic Visit - Interim History 2017    Initial Visit Date Visit date not found    PCP: Shanna Rodriguez    Julia Tabares is a 71 year old female who is seen in f/u up for Primary osteoarthritis of both knees. Since last visit on 2016, patient reports pain and swelling in her posterior left knee and calf, limiting her range of motion. No new injury.  She has had overall good relief from the synvisc injection 2016.    Symptoms are better with: nothing  Symptoms are worse with: flexion, unable to get to 90 degrees  Additional Features:   Positive: swellin   Negative: bruising, popping, grinding, catching, locking, paresthesias, numbness, weakness, pain in other joints and systemic symptoms    Social History:     Interim History - May 12, 2017  Since last visit on 2017 patient has mild-moderate left knee pain.  Synvisc One injection completed on 17 provided ~ 70 - 80% relief, continues to have discomfort in posterior knee.  Desiring to pursue baker's cyst aspiration as previously discussed.  No new injury in the interim.    Interim History - November 3, 2017  Since last visit on 2017 patient has moderate bilateral knee pain, left>>>right.  Bilateral knee synvisc one injection completed on 17 relief provided 80 - 90%  relief for ~ 4.5 months.  Left knee baker's cyst aspiration provided excellent relief.  No new injury in the interim.    Review of Systems  Skin: no bruising, yes swelling  Musculoskeletal: as above  Neurologic: no numbness, paresthesias  Remainder of review of systems is negative including constitutional, CV, pulmonary, GI, except as noted in HPI or medical history.    Patient's current problem list, past medical and surgical history, and family history were reviewed.    Patient Active Problem List   Diagnosis     Crohn's colitis (H)     Anxiety      Postmenopausal atrophic vaginitis     Advanced directives, counseling/discussion     Hyperlipidemia LDL goal <160     Left knee DJD     CTS (carpal tunnel syndrome)     Osteoporosis     Hypertension goal BP (blood pressure) < 140/90     Essential hypertension with goal blood pressure less than 140/90     History of gastroesophageal reflux (GERD)     Hiatal hernia     Past Medical History:   Diagnosis Date     Anxiety      Crohn's colitis (H)      Past Surgical History:   Procedure Laterality Date     C/SECTION, LOW TRANSVERSE  x3    , Low Transverse     HC REDUCTION OF LARGE BREAST       Family History   Problem Relation Age of Onset     CANCER Mother      Hypertension Mother      HEART DISEASE Father      DIABETES No family hx of        Objective  There were no vitals taken for this visit.    GENERAL APPEARANCE: healthy, alert and no distress   GAIT: NORMAL  SKIN: no suspicious lesions or rashes  HEENT: Sclera clear, anicteric  CV: good peripheral pulses  RESP: Breathing not labored  NEURO: Normal strength and tone, mentation intact and speech normal  PSYCH:  mentation appears normal and affect normal/bright    Bilateral Knee exam    Inspection:      no effusion, swelling of bruising bilateral       Mild posterior swelling on the left with some fullness throughout the calf on that side    Patella:      Mobility -       hypomobile bilateral       Crepitus noted in the patellofemoral joint bilateral    Tender:      Posterior left knee    Non Tender:      remainder of knee area bilateral    Knee ROM:      Flexion 90 degrees left       Extension 5 degrees left    Strength:      5/5 with knee extension bilateral    Special Tests:     neg (-) Nia left       neg (-) anterior drawer left       neg (-) posterior drawer left       neg (-) varus at 0 and 30 left       neg (-) valgus at 0 and 30 left    Gait:      normal    Neurovascular:      2+ peripheral pulses bilaterally and brisk capillary refill        sensation grossly intact    Sports Medicine Clinic Procedure  Ultrasound Guided Bilateral Intra-Articular Knee SynviscOne Injection  Technique: The risks of the procedure were explained to the patient.  A consent was signed for the intra-articular knee procedure.  The patient was evaluated with a PT [employer] ultrasound machine using a 12 MHz linear probe.   The Bilateral knee was prepped and draped in a sterile manner.  Ultrasound identification of the patella, suprapatellar pouch, quadriceps tendon and femur in both long and short axis. The probe was placed in long axis to the Bilateral femur.  A 2 inch 21 gauge needle was placed under ultrasound guidance into the superior knee joint.  20 ml of straw colored fluid was aspirated from right, none from left.   6ml of SynviscOne (48 units) into the bilateral knee. The needle was removed and there was good hemostasis without complications.  There was ultrasound documentation of needle placement and injection.    Radiology  None new    Assessment:  1. Primary osteoarthritis of both knees        Plan:  - Today's Plan of Care:  F/u prn  No ongoing pain after aspiration and injection of baker's cyst  Received good relief from Synvisc injection previously, repeated today  F/u with Dr Chua or myself prn  Reviewed wt loss, activity modification and progressive increase in activity as tolerated and guided by pain  Reviewed options for potential steroid vs viscosupplementation injections and the possibility for future orthopedic referral prn  Reviewed safe and appropriate OTC medication choices, try tylenol first  Up to 3000mg daily of tylenol is generally safe, NSAID dosing and duration limitations reviewed  Discussed nature of degenerative arthrosis of the knee.   Discussed symptom treatment with ice or heat, topical treatments, and rest if needed.   Expectations and limitations of synvisc were reviewed in detail  Often 4-6 weeks before full effect may be noticed  Usually  covered up to every 6 months by insurance, but does not need to be repeated unless pain returns, at which point we would re-evaluate  Potential use of CSI in future for flares of pain reviewed in detail  Encouraged modified progressive pain-free activity as tolerated  HEP and Supportive care reviewed  All questions were answered today  Contact us with additional questions or concerns  Signs and sx of concern reviewed      Shashi Solorzano DO, CANAUN  Primary Care Sports Medicine  [facility]

## 2017-11-04 ENCOUNTER — NURSE TRIAGE (OUTPATIENT)
Dept: NURSING | Facility: CLINIC | Age: 72
End: 2017-11-04

## 2017-11-04 NOTE — TELEPHONE ENCOUNTER
"  Additional Information    Negative: [1] Caller is not with the adult (patient) AND [2] reporting urgent symptoms    Negative: Lab result questions    Negative: Medication questions    Negative: Caller cannot be reached by phone    Negative: Caller has already spoken to PCP or another triager    Negative: RN needs further essential information from caller in order to complete triage    Negative: Requesting regular office appointment    Negative: [1] Caller requesting NON-URGENT health information AND [2] PCP's office is the best resource    Health Information question, no triage required and triager able to answer question     \"I called there([facility]) this am, see epic. I still have not heard back from the on call MD.\" I re paged per patient request. Also gave home care advice.Paged on call Dr. Guerrier (Sports med group) through page op at 2:05pm to call patient at [phone].    Protocols used: INFORMATION ONLY CALL-ADULT-    "

## 2017-11-04 NOTE — TELEPHONE ENCOUNTER
"Patient had Bilateral Intra-Articular Knee SynviscOne Injection yesterday in both knees and has been experiencing continuous pain in bilateral knees. \"I can't walk.\" Patient prefers to speak with physician.   Dr. Bateman, on call for Dr. Solorzano was paged to patient at [phone] at 11:53 hours via [employer] page .       Reason for Disposition    [1] SEVERE pain (e.g., excruciating, unable to walk) AND [2] not improved after 2 hours of pain medicine    Additional Information    Negative: Sounds like a life-threatening emergency to the triager    Negative: Followed a knee injury    Negative: Leg pain is main symptom    Negative: Knee swelling is main symptom    Negative: [1] Swollen joint AND [2] fever    Negative: [1] Red area or streak AND [2] fever    Negative: Patient sounds very sick or weak to the triager    Protocols used: KNEE PAIN-ADULT-AH    "

## 2017-11-05 ENCOUNTER — TELEPHONE (OUTPATIENT)
Dept: ORTHOPEDICS | Facility: CLINIC | Age: 72
End: 2017-11-05

## 2017-11-05 ENCOUNTER — NURSE TRIAGE (OUTPATIENT)
Dept: NURSING | Facility: CLINIC | Age: 72
End: 2017-11-05

## 2017-11-05 NOTE — TELEPHONE ENCOUNTER
"\"I called yesterday and the doctor never called me back. I had lubricant injections in my knees on Friday with Dr Solorzano.  I can't feel my knees now and I need a walker to get around.  This has never happened before.\"  Used page  at [phone] to page on call MD to page.   states that the on call is Dr Guerrier.  Dr Guerrier to call pt back at [phone].  Pt to call [facility] back in 30 minutes if she does not hear from the MD for a triage.  Pt able to bear weight, denies bowel and bladder dysfunction.    Lauren Villar RN  [facility]    "

## 2017-11-05 NOTE — TELEPHONE ENCOUNTER
Julia is calling back to have MD repaged.  Julia has not heard from MD.   [facility] repaged MD Guerrier at 11:30am for patient.

## 2017-11-06 ENCOUNTER — OFFICE VISIT (OUTPATIENT)
Dept: ORTHOPEDICS | Facility: CLINIC | Age: 72
End: 2017-11-06
Payer: COMMERCIAL

## 2017-11-06 DIAGNOSIS — T50.905A MEDICATION REACTION, INITIAL ENCOUNTER: ICD-10-CM

## 2017-11-06 DIAGNOSIS — M17.0 PRIMARY OSTEOARTHRITIS OF BOTH KNEES: Primary | ICD-10-CM

## 2017-11-06 PROCEDURE — 20610 DRAIN/INJ JOINT/BURSA W/O US: CPT | Mod: 50 | Performed by: FAMILY MEDICINE

## 2017-11-06 NOTE — TELEPHONE ENCOUNTER
LATE ENTRY    Received after hours call on 11/5/17 at ~ 11:30 am.  Patient reports receiving a Visco injection on Friday, has had previously.  Right knee is more painful, slight swelling and difficulty going from sit-stand  Denies any recorded fevers, chills, redness around the knee.  Explained that Synvisc can cause a pseudoflair - reassurance provided. Patient reports that knee pain is improving.  Asking if she needs to be seen by Dr. Solorzano. Requested that she call his office but it was not necessary that she be seen in the office.      Of note, called [facility] and spoke to after hours . Indicated that I never received any of the prior notifications/pages. Opeartor stated it was an issue with the automated system and she would notify her supervisor. In the interim updated information that I should be called directly by them at first page. Currently the process is that I'm contacted / called by them at second page.    Allie Guerrier DO, [facility]  [facility]

## 2017-11-06 NOTE — TELEPHONE ENCOUNTER
Patient left message again today stating that she continues to have moderate-severe bilateral knee pain and swelling following Synvisc injection on 11/3.      Spoke to patient discussed that pseudo reactions to Synvisc do occasionally happen following second or third dose of Synvisc.  Advised that options are continue to monitor and treat with supportive care (walker, compression, ice) or may offer knee aspiration with possible steroid injection.  Patient desires to pursue aspiration ASAP.  Advised that I would have to contact Dr Solorzano to discuss since he is out of clinic today.    Contacted Dr Solorzano and discussed case.  Given that she continues to have significant swelling and pain ~ 3 days post injection, he will see patient at 1:00pm today for aspiration.      Returned phone call to patient and noted time.  [facility]    Harvey Salas ATC

## 2017-12-18 ENCOUNTER — TELEPHONE (OUTPATIENT)
Dept: FAMILY MEDICINE | Facility: CLINIC | Age: 72
End: 2017-12-18

## 2017-12-18 NOTE — TELEPHONE ENCOUNTER
Uriel received:Julia Tabares is a 72 year old female who calls with lightheadedness, brief heart pounding feeling.    NURSING ASSESSMENT:  Description:  Sharp pains into her head which causes her to be lightheaded.  Onset/duration:  2 weeks, heart pounding x2 in the month, brief, denied arm or shoulder pain.   Precip. factors:  Stress of the holidays & doesn't have things ready.  Improves/worsens symptoms:  It passes quickly  Last exam/Treatment:  11/6  Allergies: No Known Allergies      NURSING PLAN: Advised ER with someone else to drive.    RECOMMENDED DISPOSITION:    Will comply with recommendation: No- Barriers to comply with plan of care patient thinks it is anxiety related & scheduled tomorrow.. She states she will go to ER if she worsens.     If further questions/concerns or if symptoms do not improve, worsen or new symptoms develop, call your PCP or [facility] as soon as possible.    Guideline used:  Telephone Triage Protocols for Nurses, Fifth Edition, Cookie Card RN

## 2017-12-19 ENCOUNTER — TELEPHONE (OUTPATIENT)
Dept: ORTHOPEDICS | Facility: CLINIC | Age: 72
End: 2017-12-19

## 2017-12-19 ENCOUNTER — NURSE TRIAGE (OUTPATIENT)
Dept: NURSING | Facility: CLINIC | Age: 72
End: 2017-12-19

## 2017-12-19 NOTE — TELEPHONE ENCOUNTER
Patient called at 15:22. She said that she is having swelling in her right leg and she can barely walk. She is wondering if we would be able to get her in. We can call her at [phone]    Jody ZEPEDA (R)

## 2017-12-20 RX ORDER — TRIAMCINOLONE ACETONIDE 40 MG/ML
80 INJECTION, SUSPENSION INTRA-ARTICULAR; INTRAMUSCULAR ONCE
Qty: 2 ML | Refills: 0 | OUTPATIENT
Start: 2017-12-20 | End: 2024-08-08

## 2017-12-20 NOTE — TELEPHONE ENCOUNTER
Patient returned phone call to [facility] .  Scheduled for f/u appt on 12/22.    Harvey Salas ATC

## 2017-12-20 NOTE — PROGRESS NOTES
Julia Tabares  :  1945  DOS: 2017  MRN: [id number]    Sports Medicine Clinic Procedure    Ultrasound Guided Bilateral Intra-Articular Knee Aspiration & Injection    Interim History - 2017  Since last visit on 11/3/2017 patient has moderate-severe bilateral knee pain and swelling following her Synvisc One injection.  Patient reports minimal relief with compression, icing, and NSAIDs over the weekend.  Presents for bilateral knee aspiration as discussed on the phone.     Diagnosis: No diagnosis found.     Technique: The risks of the procedure were explained to the patient.  A consent was signed for the intra-articular knee procedure.  The patient was evaluated with a [employer] ultrasound machine using a 12 MHz linear probe.     The Bilateral knee was prepped and draped in a sterile manner.  Ultrasound identification of the patella, suprapatellar pouch, quadriceps tendon and femur in both long and short axis. The probe was placed in short axis to the Bilateral femur.  A 1.5 inch 18 gauge needle was placed under ultrasound guidance into the superior knee joint.  20 - 30 ml of straw colored fluid was aspirated from bilateral knee.   A mixture of 3 ml's of 0.2% ropivacaine and 1 ml kenalog (40mg/ml) was injected without difficulty. The needle was removed and there was good hemostasis without complications.  There was ultrasound documentation of needle placement and injection.  Pre-procedural pain 8/10.  Post procedural pain 2-3/10.    Impression:  Successful Bilateral intra-articular knee aspiration and injection.    Plan:  Follow up PRN  Expectations and goals of CSI reviewed  Often 2-3 days for steroid effect, and can take up to two weeks for maximum effect  We discussed modified progressive pain-free activity as tolerated  Do not overuse in first two weeks if feeling better due to concern for vulnerability while steroid is working  Supportive care reviewed  All questions were answered  today  Contact us with additional questions or concerns  Signs and sx of concern reviewed      Shashi Solorzano DO, GERALD  Primary Care Sports Medicine  [facility]

## 2017-12-20 NOTE — TELEPHONE ENCOUNTER
"Woke up with swelling in the right knee.  Was able to go to work.  Now knee is \"very\" swollen. \"I think I need it drained, I've had this before with my other knee.\"   Can't bend the knee due to the swelling.   Knee is not discolored, feels warm to touch.   Denies fever.  No injury.     Protocol and care advice reviewed.   Patient states understanding of the recommended disposition but does not want to go to ER this evening.  Wants a message sent to the sports med provider, wants to be seen in that clinic tomorrow.   States understanding that she should be seen this evening.  Advised to call back if further questions or concerns.     Will send a message to Dr. Solorzano, [facility] in [place] per Patient request.    Reason for Disposition    [1] Can't move swollen joint at all AND [2] no fever    Protocols used: KNEE SWELLING-ADULT-AH    "

## 2017-12-20 NOTE — TELEPHONE ENCOUNTER
Reviewed patient's chart.  Given that she is having what appears to be an acute effusion would possibly offer repeat aspiration only visit at this time.  It is too early to repeat her steroid injection.  Patient may manage with NSAIDS, ice, and compression in the mean time.  Would offer her work in appt at either 9:20am or 4:00pm on Friday 12/22 @ [facility] with detailed information regarding times noted above.  I have no work in availability today.    Harvey Salas, ATC

## 2017-12-22 ENCOUNTER — OFFICE VISIT (OUTPATIENT)
Dept: ORTHOPEDICS | Facility: CLINIC | Age: 72
End: 2017-12-22
Payer: COMMERCIAL

## 2017-12-22 DIAGNOSIS — M25.469 EFFUSION OF LOWER LEG JOINT: ICD-10-CM

## 2017-12-22 DIAGNOSIS — M23.41 LOOSE BODY OF RIGHT KNEE: ICD-10-CM

## 2017-12-22 DIAGNOSIS — M17.0 PRIMARY OSTEOARTHRITIS OF BOTH KNEES: Primary | ICD-10-CM

## 2017-12-22 DIAGNOSIS — T50.905D MEDICATION REACTION, SUBSEQUENT ENCOUNTER: ICD-10-CM

## 2017-12-22 PROCEDURE — 99213 OFFICE O/P EST LOW 20 MIN: CPT | Mod: 25 | Performed by: FAMILY MEDICINE

## 2017-12-22 PROCEDURE — 20611 DRAIN/INJ JOINT/BURSA W/US: CPT | Mod: RT | Performed by: FAMILY MEDICINE

## 2017-12-22 RX ORDER — TRIAMCINOLONE ACETONIDE 40 MG/ML
40 INJECTION, SUSPENSION INTRA-ARTICULAR; INTRAMUSCULAR ONCE
Qty: 1 ML | Refills: 0 | OUTPATIENT
Start: 2017-12-22 | End: 2017-12-22

## 2017-12-22 NOTE — LETTER
2017         RE: Julia Tabares  [address] [facility] [address]        Dear Colleague,    Thank you for referring your patient, Julia Tabares, to the [facility]. Please see a copy of my visit note below.    Julia Tabares  :  1945  DOS: 2017  MRN: [id number]    Sports Medicine Clinic Visit - Interim History 2017    Initial Visit Date Visit date not found    PCP: Shanna Rodriguez    Julia Tabares is a 71 year old female who is seen in f/u up for Primary osteoarthritis of both knees. Since last visit on 2016, patient reports pain and swelling in her posterior left knee and calf, limiting her range of motion. No new injury.  She has had overall good relief from the synvisc injection 2016.    Symptoms are better with: nothing  Symptoms are worse with: flexion, unable to get to 90 degrees  Additional Features:   Positive: swelling   Negative: bruising, popping, grinding, catching, locking, paresthesias, numbness, weakness, pain in other joints and systemic symptoms    Social History:     Interim History - May 12, 2017  Since last visit on 2017 patient has mild-moderate left knee pain.  Synvisc One injection completed on 17 provided ~ 70 - 80% relief, continues to have discomfort in posterior knee.  Desiring to pursue baker's cyst aspiration as previously discussed.  No new injury in the interim.    Interim History - November 3, 2017  Since last visit on 2017 patient has moderate bilateral knee pain, left>>>right.  Bilateral knee synvisc one injection completed on 17 relief provided 80 - 90%  relief for ~ 4.5 months.  Left knee baker's cyst aspiration provided excellent relief.  No new injury in the interim.    Interim History - 2017  Since last visit on 17 patient has moderate right knee swelling and pain over the last ~ 2 - 3 days.  Bilateral knee steroid  "injection completed on  provided good relief for ~ 5 weeks on right, still having good relief on left.  Moderate knee effusion noted again today on right.  Tenderness noted over posterior knee and deep anterior knee today.  No new injury in the interim.    Review of Systems  Skin: no bruising, yes swelling  Musculoskeletal: as above  Neurologic: no numbness, paresthesias  Remainder of review of systems is negative including constitutional, CV, pulmonary, GI, except as noted in HPI or medical history.    Patient's current problem list, past medical and surgical history, and family history were reviewed.    Patient Active Problem List   Diagnosis     Crohn's colitis (H)     Anxiety     Postmenopausal atrophic vaginitis     Advanced directives, counseling/discussion     Hyperlipidemia LDL goal <160     Left knee DJD     CTS (carpal tunnel syndrome)     Osteoporosis     Hypertension goal BP (blood pressure) < 140/90     Essential hypertension with goal blood pressure less than 140/90     History of gastroesophageal reflux (GERD)     Hiatal hernia     Past Medical History:   Diagnosis Date     Anxiety      Crohn's colitis (H)      Past Surgical History:   Procedure Laterality Date     C/SECTION, LOW TRANSVERSE  x3    , Low Transverse     HC REDUCTION OF LARGE BREAST       Family History   Problem Relation Age of Onset     CANCER Mother      Hypertension Mother      HEART DISEASE Father      DIABETES No family hx of        Objective  BP (P) 189/82  Ht (P) 5' 3.5\" (1.613 m)  Wt (P) 145 lb (65.8 kg)  BMI (P) 25.28 kg/m2    GENERAL APPEARANCE: healthy, alert and no distress   GAIT: NORMAL  SKIN: no suspicious lesions or rashes  HEENT: Sclera clear, anicteric  CV: good peripheral pulses  RESP: Breathing not labored  NEURO: Normal strength and tone, mentation intact and speech normal  PSYCH:  mentation appears normal and affect normal/bright    Bilateral Knee exam    Inspection:      moderate effusion, swelling " of bruising bilateral       Mild posterior swelling on the left with some fullness throughout the calf on that side    Patella:      Mobility -       hypomobile bilateral       Crepitus noted in the patellofemoral joint bilateral    Tender:      Posterior left knee    Non Tender:      remainder of knee area bilateral    Knee ROM:      Flexion 110 degrees left       Extension 5 degrees left    Strength:      5/5 with knee extension bilateral    Special Tests:     neg (-) Nia left       neg (-) anterior drawer left       neg (-) posterior drawer left       neg (-) varus at 0 and 30 left       neg (-) valgus at 0 and 30 left    Gait:      normal    Neurovascular:      2+ peripheral pulses bilaterally and brisk capillary refill       sensation grossly intact    Sports Medicine Clinic Procedure  Ultrasound Guided Right Intra-Articular Knee Aspiration & Injection    Technique: The risks of the procedure were explained to the patient.  A consent was signed for the intra-articular knee procedure.  The patient was evaluated with a [employer] ultrasound machine using a 12 MHz linear probe.   The Right knee was prepped and draped in a sterile manner.  Ultrasound identification of the patella, suprapatellar pouch, quadriceps tendon and femur in both long and short axis. The probe was placed in short axis to the Right femur.  A 1.5 inch 18 gauge needle was placed under ultrasound guidance into the superior knee joint.  36 ml of straw colored fluid was aspirated.   A mixture of 3 ml's of 0.2% ropivacaine and 1 ml kenalog (40mg/ml) was injected without difficulty. The needle was removed and there was good hemostasis without complications.  There was ultrasound documentation of needle placement and injection.  Pre-procedural pain 6/10.  Post procedural pain 2/10.    Radiology  None new, reviewed prior imaging    Assessment:  1. Primary osteoarthritis of both knees    2. Baker's cyst, left    3. Loose body of right knee    4.  Effusion of lower leg joint    5. Medication reaction, subsequent encounter        Plan:  F/u prn  Repeat steroid injection today for recurrent effusion, secondary to ongoing OA flare vs mild residua of medication reaction  Placed referral for TKA discussion today with ortho  Reviewed wt loss, activity modification and progressive increase in activity as tolerated and guided by pain  Reviewed options for potential steroid vs viscosupplementation injections and the possibility for future orthopedic referral prn  Reviewed safe and appropriate OTC medication choices, try tylenol first  Up to 3000mg daily of tylenol is generally safe, NSAID dosing and duration limitations reviewed  Discussed nature of degenerative arthrosis of the knee.   Discussed symptom treatment with ice or heat, topical treatments, and rest if needed.   Expectations and goals of CSI reviewed  Often 2-3 days for steroid effect, and can take up to two weeks for maximum effect  We discussed modified progressive pain-free activity as tolerated  Do not overuse in first two weeks if feeling better due to concern for vulnerability while steroid is working  Supportive care reviewed  All questions were answered today  Contact us with additional questions or concerns  Signs and sx of concern reviewed        Shashi Solorzano DO, CAQ  Primary Care Sports Medicine  [facility]         Again, thank you for allowing me to participate in the care of your patient.        Sincerely,        Shashi Solorzano DO

## 2017-12-22 NOTE — PROGRESS NOTES
Julia Tabares  :  1945  DOS: 2017  MRN: [id number]    Sports Medicine Clinic Visit - Interim History 2017    Initial Visit Date Visit date not found    PCP: Shanna Rodriguez    Julia Tabares is a 71 year old female who is seen in f/u up for Primary osteoarthritis of both knees. Since last visit on 2016, patient reports pain and swelling in her posterior left knee and calf, limiting her range of motion. No new injury.  She has had overall good relief from the synvisc injection 2016.    Symptoms are better with: nothing  Symptoms are worse with: flexion, unable to get to 90 degrees  Additional Features:   Positive: swelling   Negative: bruising, popping, grinding, catching, locking, paresthesias, numbness, weakness, pain in other joints and systemic symptoms    Social History:     Interim History - May 12, 2017  Since last visit on 2017 patient has mild-moderate left knee pain.  Synvisc One injection completed on 17 provided ~ 70 - 80% relief, continues to have discomfort in posterior knee.  Desiring to pursue baker's cyst aspiration as previously discussed.  No new injury in the interim.    Interim History - November 3, 2017  Since last visit on 2017 patient has moderate bilateral knee pain, left>>>right.  Bilateral knee synvisc one injection completed on 17 relief provided 80 - 90%  relief for ~ 4.5 months.  Left knee baker's cyst aspiration provided excellent relief.  No new injury in the interim.    Interim History - 2017  Since last visit on 17 patient has moderate right knee swelling and pain over the last ~ 2 - 3 days.  Bilateral knee steroid injection completed on  provided good relief for ~ 5 weeks on right, still having good relief on left.  Moderate knee effusion noted again today on right.  Tenderness noted over posterior knee and deep anterior knee today.  No new injury in the interim.    Review of  "Systems  Skin: no bruising, yes swelling  Musculoskeletal: as above  Neurologic: no numbness, paresthesias  Remainder of review of systems is negative including constitutional, CV, pulmonary, GI, except as noted in HPI or medical history.    Patient's current problem list, past medical and surgical history, and family history were reviewed.    Patient Active Problem List   Diagnosis     Crohn's colitis (H)     Anxiety     Postmenopausal atrophic vaginitis     Advanced directives, counseling/discussion     Hyperlipidemia LDL goal <160     Left knee DJD     CTS (carpal tunnel syndrome)     Osteoporosis     Hypertension goal BP (blood pressure) < 140/90     Essential hypertension with goal blood pressure less than 140/90     History of gastroesophageal reflux (GERD)     Hiatal hernia     Past Medical History:   Diagnosis Date     Anxiety      Crohn's colitis (H)      Past Surgical History:   Procedure Laterality Date     C/SECTION, LOW TRANSVERSE  x3    , Low Transverse     HC REDUCTION OF LARGE BREAST       Family History   Problem Relation Age of Onset     CANCER Mother      Hypertension Mother      HEART DISEASE Father      DIABETES No family hx of        Objective  BP (P) 189/82  Ht (P) 5' 3.5\" (1.613 m)  Wt (P) 145 lb (65.8 kg)  BMI (P) 25.28 kg/m2    GENERAL APPEARANCE: healthy, alert and no distress   GAIT: NORMAL  SKIN: no suspicious lesions or rashes  HEENT: Sclera clear, anicteric  CV: good peripheral pulses  RESP: Breathing not labored  NEURO: Normal strength and tone, mentation intact and speech normal  PSYCH:  mentation appears normal and affect normal/bright    Bilateral Knee exam    Inspection:      moderate effusion, swelling of bruising bilateral       Mild posterior swelling on the left with some fullness throughout the calf on that side    Patella:      Mobility -       hypomobile bilateral       Crepitus noted in the patellofemoral joint bilateral    Tender:      Posterior left " knee    Non Tender:      remainder of knee area bilateral    Knee ROM:      Flexion 110 degrees left       Extension 5 degrees left    Strength:      5/5 with knee extension bilateral    Special Tests:     neg (-) Nia left       neg (-) anterior drawer left       neg (-) posterior drawer left       neg (-) varus at 0 and 30 left       neg (-) valgus at 0 and 30 left    Gait:      normal    Neurovascular:      2+ peripheral pulses bilaterally and brisk capillary refill       sensation grossly intact    Sports Medicine Clinic Procedure  Ultrasound Guided Right Intra-Articular Knee Aspiration & Injection    Technique: The risks of the procedure were explained to the patient.  A consent was signed for the intra-articular knee procedure.  The patient was evaluated with a [employer] ultrasound machine using a 12 MHz linear probe.   The Right knee was prepped and draped in a sterile manner.  Ultrasound identification of the patella, suprapatellar pouch, quadriceps tendon and femur in both long and short axis. The probe was placed in short axis to the Right femur.  A 1.5 inch 18 gauge needle was placed under ultrasound guidance into the superior knee joint.  36 ml of straw colored fluid was aspirated.   A mixture of 3 ml's of 0.2% ropivacaine and 1 ml kenalog (40mg/ml) was injected without difficulty. The needle was removed and there was good hemostasis without complications.  There was ultrasound documentation of needle placement and injection.  Pre-procedural pain 6/10.  Post procedural pain 2/10.    Radiology  None new, reviewed prior imaging    Assessment:  1. Primary osteoarthritis of both knees    2. Baker's cyst, left    3. Loose body of right knee    4. Effusion of lower leg joint    5. Medication reaction, subsequent encounter        Plan:  F/u prn  Repeat steroid injection today for recurrent effusion, secondary to ongoing OA flare vs mild residua of medication reaction  Placed referral for TKA discussion  today with ortho  Reviewed wt loss, activity modification and progressive increase in activity as tolerated and guided by pain  Reviewed options for potential steroid vs viscosupplementation injections and the possibility for future orthopedic referral prn  Reviewed safe and appropriate OTC medication choices, try tylenol first  Up to 3000mg daily of tylenol is generally safe, NSAID dosing and duration limitations reviewed  Discussed nature of degenerative arthrosis of the knee.   Discussed symptom treatment with ice or heat, topical treatments, and rest if needed.   Expectations and goals of CSI reviewed  Often 2-3 days for steroid effect, and can take up to two weeks for maximum effect  We discussed modified progressive pain-free activity as tolerated  Do not overuse in first two weeks if feeling better due to concern for vulnerability while steroid is working  Supportive care reviewed  All questions were answered today  Contact us with additional questions or concerns  Signs and sx of concern reviewed        Shashi Solorzano DO, GERALD  Primary Care Sports Medicine  [facility]

## 2017-12-22 NOTE — MR AVS SNAPSHOT
After Visit Summary   12/22/2017    Julia Tabares    MRN: [id number]           Patient Information     Date Of Birth          1945        Visit Information        Provider Department      12/22/2017 9:20 AM Shashi Solorzano DO [facility] [place]        Today's Diagnoses     Primary osteoarthritis of both knees    -  1    Loose body of right knee        Effusion of lower leg joint        Medication reaction, subsequent encounter           Follow-ups after your visit        Additional Services     ORTHO  REFERRAL       [facility] is referring you to the Orthopedic  Services at [facility].       The  Representative will assist you in the coordination of your Orthopedic and Musculoskeletal Care as prescribed by your physician.    The  Representative will call you within 1 business day to help schedule your appointment, or you may contact the  Representative at:    All areas ~ [phone]     Type of Referral : Surgical / Specialist, Dr Dez Hall of [facility]      Timeframe requested: Routine    Coverage of these services is subject to the terms and limitations of your health insurance plan.  Please call member services at your health plan with any benefit or coverage questions.      If X-rays, CT or MRI's have been performed, please contact the facility where they were done to arrange for , prior to your scheduled appointment.  Please bring this referral request to your appointment and present it to your specialist.                  Your next 10 appointments already scheduled     Jan 05, 2018  1:40 PM CST   PROCEDURE with Shsahi Solorzano DO   [facility] [place] ([facility] [place])    [address]  [place] [id number] [phone]              Who to contact     If you have questions or need follow up  information about today's clinic visit or your schedule please contact [facility] directly at [phone].  Normal or non-critical lab and imaging results will be communicated to you by SecureMediahart, letter or phone within 4 business days after the clinic has received the results. If you do not hear from us within 7 days, please contact the clinic through [email or web address] or phone. If you have a critical or abnormal lab result, we will notify you by phone as soon as possible.  Submit refill requests through [email or web address] or call your pharmacy and they will forward the refill request to us. Please allow 3 business days for your refill to be completed.          Additional Information About Your Visit        [employer] Information     [email or web address] gives you secure access to your electronic health record. If you see a primary care provider, you can also send messages to your care team and make appointments. If you have questions, please call your primary care clinic.  If you do not have a primary care provider, please call [phone] and they will assist you.        Care EveryWhere ID     This is your Care EveryWhere ID. This could be used by other organizations to access your [facility] medical records  [id number]         Blood Pressure from Last 3 Encounters:   12/22/17 (P) 189/82   09/07/17 130/78   08/28/17 134/66    Weight from Last 3 Encounters:   12/22/17 (P) 145 lb (65.8 kg)   08/28/17 145 lb 2 oz (65.8 kg)   06/05/17 151 lb 6 oz (68.7 kg)              We Performed the Following      ARTHROCENTESIS ASPIR&/INJ MAJOR JT/BURSA W/US     ORTHO  REFERRAL     TRIAMCINOLONE ACET INJ NOS          Today's Medication Changes          These changes are accurate as of: 12/22/17  4:10 PM.  If you have any questions, ask your nurse or doctor.               Start taking these medicines.        Dose/Directions    triamcinolone acetonide 40 MG/ML injection   Commonly known as:  KENALOG-40   Used for:  Primary  osteoarthritis of both knees, Loose body of right knee, Effusion of lower leg joint   Started by:  Shashi Solorzano DO        Dose:  40 mg   1 mL (40 mg) by INTRA-ARTICULAR route once for 1 dose   Quantity:  1 mL   Refills:  0            Where to get your medicines      Some of these will need a paper prescription and others can be bought over the counter.  Ask your nurse if you have questions.     You don't need a prescription for these medications     triamcinolone acetonide 40 MG/ML injection                Primary Care Provider Office Phone # Fax #    Shanna Rodriguez [phone]       [address] [address]        Equal Access to Services     [facility] : Hadii mina singh hadeditho Soabby, waaxda luqadaha, qaybta kaalmada will, sunshine cabral. So [facility] [phone].    ATENCIÓN: Si habla español, tiene a dudley disposición servicios gratuitos de asistencia lingüística. Ll[facility] [phone].    We comply with applicable federal civil rights laws and [place] laws. We do not discriminate on the basis of race, color, national origin, age, disability, sex, sexual orientation, or gender identity.            Thank you!     Thank you for choosing [facility]  for your care. Our goal is always to provide you with excellent care. Hearing back from our patients is one way we can continue to improve our services. Please take a few minutes to complete the written survey that you may receive in the mail after your visit with us. Thank you!             Your Updated Medication List - Protect others around you: Learn how to safely use, store and throw away your medicines at www.disposemymeds.org.          This list is accurate as of: 12/22/17  4:10 PM.  Always use your most recent med list.                   Brand Name Dispense Instructions for use Diagnosis    ASACOL 400 MG EC tablet   Generic drug:  mesalamine      4  TABLETS DAILY        benzoyl peroxide 5 % topical gel     60 g    Apply topically daily as needed    Acne, unspecified acne type       CALCIUM + D PO      Take  by mouth. ONCE DAILY.        citalopram 20 MG tablet    celeXA    90 tablet    Take 0.5-1 tablets (10-20 mg) by mouth daily    Anxiety       clotrimazole 1 % cream    LOTRIMIN    60 g    Apply topically 2 times daily    Skin rash       diclofenac 1 % Gel topical gel    VOLTAREN    100 g    Apply 4 grams to knees or 2 grams to hands four times daily using enclosed dosing card.    Knee pain, right       hydrochlorothiazide 25 MG tablet    HYDRODIURIL    90 tablet    Take 1 tablet (25 mg) by mouth daily    Essential hypertension with goal blood pressure less than 140/90       MULTIVITAMIN PO      Take  by mouth. ONCE DAILY.        triamcinolone 0.1 % cream    KENALOG    30 g    Apply topically 2 times daily    Skin rash       triamcinolone acetonide 40 MG/ML injection    KENALOG-40    1 mL    1 mL (40 mg) by INTRA-ARTICULAR route once for 1 dose    Primary osteoarthritis of both knees, Loose body of right knee, Effusion of lower leg joint       * UNABLE TO FIND      MEDICATION NAME: Cur Man Vitamin        * UNABLE TO FIND      MEDICATION NAME: Collagen vitamin        * Notice:  This list has 2 medication(s) that are the same as other medications prescribed for you. Read the directions carefully, and ask your doctor or other care provider to review them with you.

## 2017-12-23 ENCOUNTER — TELEPHONE (OUTPATIENT)
Dept: FAMILY MEDICINE | Facility: CLINIC | Age: 72
End: 2017-12-23

## 2017-12-23 NOTE — TELEPHONE ENCOUNTER
Reason for call:  Same Day Appointment   Requested Provider:     PCP: [unfilled]    Reason for visit: Wanting to get in to see Dr. Rodriguez before she leaves    Duration of symptoms:  headaches, blood pressure questions    Have you been treated for this in the past? No    Additional comments: Wants to see her and recommendations on who to see before she leaves        Phone number to reach patient:  Home number on file [phone] (home)    Best Time: anytime    Can we leave a detailed message on this number?  YES

## 2017-12-26 NOTE — TELEPHONE ENCOUNTER
Attempted to reach patient by phone with no answer.  Left  for return call.  Elenita Reyes RN

## 2018-01-02 NOTE — TELEPHONE ENCOUNTER
Patient is scheduled on 1/23 with another provider. Left a detailed VM #2 that there are openings with another provider tomorrow if she wants to be seen sooner.  Trina Card RN

## 2018-01-05 ENCOUNTER — OFFICE VISIT (OUTPATIENT)
Dept: ORTHOPEDICS | Facility: CLINIC | Age: 73
End: 2018-01-05
Payer: COMMERCIAL

## 2018-01-05 VITALS
HEIGHT: 64 IN | SYSTOLIC BLOOD PRESSURE: 160 MMHG | DIASTOLIC BLOOD PRESSURE: 102 MMHG | BODY MASS INDEX: 24.75 KG/M2 | WEIGHT: 145 LBS

## 2018-01-05 DIAGNOSIS — M17.0 PRIMARY OSTEOARTHRITIS OF BOTH KNEES: Primary | ICD-10-CM

## 2018-01-05 DIAGNOSIS — M54.2 CERVICALGIA: ICD-10-CM

## 2018-01-05 DIAGNOSIS — M18.0 PRIMARY OSTEOARTHRITIS OF BOTH FIRST CARPOMETACARPAL JOINTS: ICD-10-CM

## 2018-01-05 PROCEDURE — 99207 ZZC NO BILLABLE SERVICE THIS VISIT: CPT | Performed by: FAMILY MEDICINE

## 2018-01-05 NOTE — LETTER
"    1/5/2018         RE: Julia Tabares  [address] [facility] [address]        Dear Colleague,    Thank you for referring your patient, Julia Tabares, to the [facility]. Please see a copy of my visit note below.    Patient came in today to consider previously discussed CMC joint injections.  Had prior in May and August 2017.  May be considering TKA soon.  ADvised trying to continue supportive care and bracing and avoid for now.  Has had multiple other CSIs in the past year, dicussed trying to limit steroid load over time, and utilizing the injections at a more difficult time, perhaps s/p TKA if her sx flare with crutch/assistive device use.    We agreed to defer visit/procedure today.  PT was ordered for knon cervical and knee pain.  Knee will be \"prehab\" before TKA, neck for ongoing HEP and tx.      Shashi Solorzano DO, CAQ  Primary Care Sports Medicine  [facility]         Again, thank you for allowing me to participate in the care of your patient.        Sincerely,        Shashi Solorzano DO    "

## 2018-01-08 NOTE — PROGRESS NOTES
"Patient came in today to consider previously discussed CMC joint injections.  Had prior in May and August 2017.  May be considering TKA soon.  ADvised trying to continue supportive care and bracing and avoid for now.  Has had multiple other CSIs in the past year, dicussed trying to limit steroid load over time, and utilizing the injections at a more difficult time, perhaps s/p TKA if her sx flare with crutch/assistive device use.    We agreed to defer visit/procedure today.  PT was ordered for knon cervical and knee pain.  Knee will be \"prehab\" before TKA, neck for ongoing HEP and tx.      Shashi Solorzano DO, CAQ  Primary Care Sports Medicine  [facility]       "

## 2018-01-10 DIAGNOSIS — I10 ESSENTIAL HYPERTENSION WITH GOAL BLOOD PRESSURE LESS THAN 140/90: ICD-10-CM

## 2018-01-10 NOTE — TELEPHONE ENCOUNTER
"Requested Prescriptions   Pending Prescriptions Disp Refills     hydrochlorothiazide (HYDRODIURIL) 25 MG tablet [Pharmacy Med Name: HYDROCHLOROTHIAZIDE 25MG TABLETS]  Last Written Prescription Date:  1/4/2017  Last Fill Quantity: 90 tablet,  # refills: 3   Last Office Visit with [facility], [facility] or [facility] prescribing provider:  8/28/2017  Future Office Visit:    Next 5 appointments (look out 90 days)     Jan 23, 2018  1:40 PM CST   [facility]    [address] [place] [id number] [phone]               90 tablet 0     Sig: TAKE 1 TABLET(25 MG) BY MOUTH DAILY    Diuretics (Including Combos) Protocol Failed    1/10/2018 11:02 AM       Failed - Blood pressure under 140/90    BP Readings from Last 3 Encounters:   01/05/18 (!) 160/102   12/22/17 (P) 189/82   09/07/17 130/78          Passed - Recent or future visit with authorizing provider's specialty    Patient had office visit in the last year or has a visit in the next 30 days with authorizing provider.  See \"Patient Info\" tab in inbasket, or \"Choose Columns\" in Meds & Orders section of the refill encounter.        Passed - Patient is age 18 or older       Passed - No active pregancy on record       Passed - Normal serum creatinine on file in past 12 months    Recent Labs   Lab Test  06/05/17   1508   CR  0.67          Passed - Normal serum potassium on file in past 12 months    Recent Labs   Lab Test  06/05/17   1508   POTASSIUM  3.8          Passed - Normal serum sodium on file in past 12 months    Recent Labs   Lab Test  06/05/17   1508   NA  144          Passed - No positive pregnancy test in past 12 months          "

## 2018-01-12 RX ORDER — HYDROCHLOROTHIAZIDE 25 MG/1
TABLET ORAL
Qty: 30 TABLET | Refills: 0 | Status: SHIPPED | OUTPATIENT
Start: 2018-01-12 | End: 2018-01-23

## 2018-01-23 ENCOUNTER — OFFICE VISIT (OUTPATIENT)
Dept: FAMILY MEDICINE | Facility: CLINIC | Age: 73
End: 2018-01-23
Payer: COMMERCIAL

## 2018-01-23 VITALS
HEIGHT: 64 IN | BODY MASS INDEX: 25.16 KG/M2 | TEMPERATURE: 99.4 F | WEIGHT: 147.4 LBS | DIASTOLIC BLOOD PRESSURE: 82 MMHG | HEART RATE: 72 BPM | SYSTOLIC BLOOD PRESSURE: 134 MMHG

## 2018-01-23 DIAGNOSIS — Z12.39 SCREENING FOR BREAST CANCER: ICD-10-CM

## 2018-01-23 DIAGNOSIS — I10 ESSENTIAL HYPERTENSION WITH GOAL BLOOD PRESSURE LESS THAN 140/90: Primary | ICD-10-CM

## 2018-01-23 PROCEDURE — 99213 OFFICE O/P EST LOW 20 MIN: CPT | Performed by: FAMILY MEDICINE

## 2018-01-23 RX ORDER — HYDROCHLOROTHIAZIDE 25 MG/1
TABLET ORAL
Qty: 90 TABLET | Refills: 0 | Status: SHIPPED | OUTPATIENT
Start: 2018-01-23 | End: 2018-05-03

## 2018-01-23 NOTE — PROGRESS NOTES
SUBJECTIVE:   Julia Tabares is a 72 year old female who presents to clinic today for the following health issues:      Hypertension Follow-up      Outpatient blood pressures are being checked at home.  Results are 198/100- highest, 168/124- lowest, .    Low Salt Diet: no added salt    She just recently started taking hydrochlorathiazide about 4-5 months ago. She states that she will take Celexa is she is feeling palpitations. She states that she will feel her blood pressure getting high.     Other Concerns:  She has arthritis in her knees, wrists and neck. She is a instructor for barre, yoga, and rony. She has been taking turmeric and she states this has been helping her inflammation. She states that Dr. Solorzano discussed that it may be time for a knee replacement, but she is really not wanting to do this. She has seen [facility] orthopedics for her hands in the past and is thinking she will go back there again. She states that she is very active but for some reason has been feeling ''off'' and somewhat dizzy even while she is sitting. She is feeling fatigued more often than usual again. She states that she may not have been drinking enough water. She states that her mouth seems to be dry very often.     She states that she seems to be more forgetful.     She had a bone density scan in 2015, and it resulted in osteoporosis.    Of Note:  Her father had a heart attack in his 50's.       Amount of exercise or physical activity:  5 days per week    Problems taking medications regularly: No    Medication side effects: none    Diet: regular (no restrictions)      Problem list and histories reviewed & adjusted, as indicated.  Additional history: as documented    Patient Active Problem List   Diagnosis     Crohn's colitis (H)     Anxiety     Postmenopausal atrophic vaginitis     Advanced directives, counseling/discussion     Hyperlipidemia LDL goal <160     Left knee DJD     CTS (carpal tunnel syndrome)     Osteoporosis      Hypertension goal BP (blood pressure) < 140/90     Essential hypertension with goal blood pressure less than 140/90     History of gastroesophageal reflux (GERD)     Hiatal hernia     Past Surgical History:   Procedure Laterality Date     C/SECTION, LOW TRANSVERSE  x3    , Low Transverse     HC REDUCTION OF LARGE BREAST         Social History   Substance Use Topics     Smoking status: Former Smoker     Quit date: 10/13/1992     Smokeless tobacco: Never Used     Alcohol use No     Family History   Problem Relation Age of Onset     CANCER Mother      Hypertension Mother      HEART DISEASE Father      DIABETES No family hx of          Current Outpatient Prescriptions   Medication Sig Dispense Refill     hydrochlorothiazide (HYDRODIURIL) 25 MG tablet TAKE 1 TABLET(25 MG) BY MOUTH DAILY 30 tablet 0     triamcinolone (KENALOG) 0.1 % cream Apply topically 2 times daily 30 g 1     clotrimazole (LOTRIMIN) 1 % cream Apply topically 2 times daily 60 g 1     citalopram (CELEXA) 20 MG tablet Take 0.5-1 tablets (10-20 mg) by mouth daily 90 tablet 3     benzoyl peroxide 5 % topical gel Apply topically daily as needed 60 g 1     UNABLE TO FIND MEDICATION NAME: Cur Man Vitamin       UNABLE TO FIND MEDICATION NAME: Collagen vitamin       diclofenac (VOLTAREN) 1 % GEL Apply 4 grams to knees or 2 grams to hands four times daily using enclosed dosing card. 100 g 1     mesalamine (ASACOL) 400 MG EC tablet 4 TABLETS DAILY       Calcium-Vitamin D (CALCIUM + D PO) Take  by mouth. ONCE DAILY.        Multiple Vitamin (MULTIVITAMIN OR) Take  by mouth. ONCE DAILY.        No Known Allergies      Reviewed and updated as needed this visit by clinical staffTobacco  Allergies  Meds  Med Hx  Surg Hx  Fam Hx  Soc Hx      Reviewed and updated as needed this visit by Provider  Allergies  Meds  Problems         ROS:  Constitutional, HEENT, cardiovascular, pulmonary, gi and gu systems are negative, except as otherwise noted.    This  "document serves as a record of the services and decisions personally performed by SAVANNAH RIVERA. It was created on his/her behalf by Cris Bryan, a trained medical scribe. The creation of this document is based on the provider's statements to the medical scribe. Cris Bryan, January 23, 2018 1:46 PM    OBJECTIVE:   /84 (BP Location: Right arm, Patient Position: Sitting, Cuff Size: Adult Regular)  Pulse 72  Temp 99.4  F (37.4  C) (Oral)  Ht 1.613 m (5' 3.5\")  Wt 66.9 kg (147 lb 6.4 oz)  BMI 25.7 kg/m2  Body mass index is 25.7 kg/(m^2).  GENERAL: healthy, alert and no distress  PSYCH: mentation appears normal, affect normal/bright    Diagnostic Test Results:  No results found for this or any previous visit (from the past 24 hour(s)).    ASSESSMENT/PLAN:     (Z12.31) Screening for breast cancer    Comment: Health maintenance.  Plan: *MA Screening Digital Bilateral            (I10) Essential hypertension with goal blood pressure less than 140/90  Comment: Patient has had some high BP readings at home. Was high in clinic initially today, but decreased with rest.   Plan: hydrochlorothiazide (HYDRODIURIL) 25 MG tablet,        24 Hour Blood Pressure Monitor - Adult        I ordered a 24 hour monitor for her blood pressure to determine if further treatment is necessary. Continue current medications.    Encouraged her to drink more fluids as the dizzy feeling has begun since the winter months and starting HCTZ.  Discussed warning signs/symptoms for which she needs followup.    Also encouraged her to follow up for CPE/Wellness visit.    There are no Patient Instructions on file for this visit.    Savannah Rivera MD  [facility]    The information in this document, created by the medical scribe Cris Bryan for me, accurately reflects the services I personally performed and the decisions made by me. I have reviewed and approved this document for accuracy prior to leaving the " patient care area.

## 2018-01-23 NOTE — PATIENT INSTRUCTIONS
The cardiology department will call you to schedule the blood pressure monitor.  If you do not hear from them within 48 hours, please contact my .    To schedule your mammogram  [facility] (xray, mammogram, bone density, and ultrasound) [phone]  OR  [facility] [phone]      [facility]   Discharged by : Harmony WHITMAN MA    If you have any questions regarding your visit please contact your care team:     Team Gold                Clinic Hours Telephone Number     Dr. Hazel Pa 7am-7pm  Monday - Thursday   7am-5pm    [phone]   (Appointment scheduling available )     RN Line  [phone] option 2     Urgent Care - [place] and [place] - 11am-9pm Monday--- 9am-5pm     [place] -   5pm-9pm Monday--- 9am-5pm    [phone] - [place]    [phone] - [place]       For a Price Quote for your services, please call our Consumer Price Line at [phone].     What options do I have for visits at the clinic other than the traditional office visit?     To expand how we care for you, many of our providers are utilizing electronic visits (e-visits) and telephone visits, when medically appropriate, for interactions with their patients rather than a visit in the clinic. We also offer nurse visits for many medical concerns. Just like any other service, we will bill your insurance company for this type of visit based on time spent on the phone with your provider. Not all insurance companies cover these visits. Please check with your medical insurance if this type of visit is covered. You will be responsible for any charges that are not paid by your insurance.   E-visits via [employer]: generally incur a $35.00 fee.     Telephone visits:   Time spent on the phone:  *charged based on time that is spent on the phone in increments of 10 minutes. Estimated cost:   5-10 mins $30.00   11-20 mins. $59.00   21-30 mins. $85.00     Use Xenetat (secure email communication and access to your chart) to send your primary care provider a message or make an appointment. Ask someone on your Team how to sign up for SideStripe.     As always, Thank you for trusting us with your health care needs!      [facility]:    Scheduling Appointments  [facility]  Call: [phone]    [facility]  Call: [phone]    [facility]  Call: [phone]    For Gastroenterology referrals   [facility]   Clinics and Surgery Center, 4th Floor   [address] [place] [address]   Appointments: [phone]    WHERE TO GO FOR CARE?  Clinic    Make an appointment if you:       Are sick (cold, cough, flu, sore throat, earache or in pain).       Have a small injury (sprain, small cut, burn or broken bone).       Need a physical exam, Pap smear, vaccine or prescription refill.       Have questions about your health or medicines.    To reach us:      Call [phone] ([phone]). Open 24 hours every day. (For counseling services, call [phone].)    Log into SideStripe at [email or web address]. (Visit [email or web address] to create an account.) Hospital emergency room    An emergency is a serious or life- threatening problem that must be treated right away.    Call 339 or get to the hospital if you have:      Very bad or sudden:            - Chest pain or pressure         - Bleeding         - Head or belly pain         - Dizziness or trouble seeing, walking or                          Speaking      Problems breathing      Blood in your vomit or you are coughing up blood      A major injury (knocked out, loss of a finger or limb, rape, broken bone protruding from skin)    A mental health crisis. (Or  call the Mental Health Crisis line at 1-385.207.3768 or Suicide Prevention Hotline at 1-256.477.4931.)    Open 24 hours every day. You don't need an appointment.     Urgent care    Visit urgent care for sickness or small injuries when the clinic is closed. You don't need an appointment. To check hours or find an urgent care near you, visit [email or web address]. Online care    Get online care from [facility] for more than 70 common problems, like colds, allergies and infections. Open 24 hours every day at:   [email or web address]   Need help deciding?    For advice about where to be seen, you may call your clinic and ask to speak with a nurse. We're here for you 24 hours every day.         If you are deaf or hard of hearing, please let us know. We provide many free services including sign language interpreters, oral interpreters, TTYs, telephone amplifiers, note takers and written materials.

## 2018-01-23 NOTE — MR AVS SNAPSHOT
After Visit Summary   2018    Julia Tabares    MRN: [id number]           Patient Information     Date Of Birth          1945        Visit Information        Provider Department      2018 1:40 PM Savannah Ayala MD [facility]        Today's Diagnoses     Screening for breast cancer    -  1    Essential hypertension with goal blood pressure less than 140/90          Care Instructions    The cardiology department will call you to schedule the blood pressure monitor.  If you do not hear from them within 48 hours, please contact my .    To schedule your mammogram  [facility] (xray, mammogram, bone density, and ultrasound) [phone]  OR  [facility] [phone]      [facility]   Discharged by : Harmony WHITMAN MA    If you have any questions regarding your visit please contact your care team:     Team Gold                Clinic Hours Telephone Number     Dr. Hazel Pa 7am-7pm  Monday - Thursday   7am-5pm    [phone]   (Appointment scheduling available )     RN Line  [phone] option 2     Urgent Care - [place] and [place] - 11am-9pm Monday--- 9am-5pm     [place] -   5pm-9pm Monday--- 9am-5pm    [phone] - [place] Arevalo    [phone] - [place]       For a Price Quote for your services, please call our Consumer Price Line at [phone].     What options do I have for visits at the clinic other than the traditional office visit?     To expand how we care for you, many of our providers are utilizing electronic visits (e-visits) and telephone visits, when medically appropriate, for interactions with their patients rather than a visit in the clinic. We also offer nurse visits for many medical  concerns. Just like any other service, we will bill your insurance company for this type of visit based on time spent on the phone with your provider. Not all insurance companies cover these visits. Please check with your medical insurance if this type of visit is covered. You will be responsible for any charges that are not paid by your insurance.   E-visits via Ratiohart: generally incur a $35.00 fee.     Telephone visits:   Time spent on the phone: *charged based on time that is spent on the phone in increments of 10 minutes. Estimated cost:   5-10 mins $30.00   11-20 mins. $59.00   21-30 mins. $85.00     Use [employer] (secure email communication and access to your chart) to send your primary care provider a message or make an appointment. Ask someone on your Team how to sign up for [employer].     As always, Thank you for trusting us with your health care needs!      [facility]:    Scheduling Appointments  [facility]  Call: [phone]    [facility]  Call: [phone]    [facility]  Call: [phone]    For Gastroenterology referrals   [facility]   Clinics and Surgery Center, 4th Floor   [address] [place] [address]   Appointments: [phone]    WHERE TO GO FOR CARE?  Clinic    Make an appointment if you:       Are sick (cold, cough, flu, sore throat, earache or in pain).       Have a small injury (sprain, small cut, burn or broken bone).       Need a physical exam, Pap smear, vaccine or prescription refill.       Have questions about your health or medicines.    To reach us:      Call [phone] ([phone]). Open 24 hours every day. (For counseling services, call [phone].)    Log into [employer]ying [employer] at [email or web address]. (Visit [email or web address]¨â€[email or web address]¨â€¦[email or web address]€˜[email or web address] to create an account.) Hospital emergency room    An emergency is a serious or life- threatening problem that must be treated right  away.    Call 911 or get to the hospital if you have:      Very bad or sudden:            - Chest pain or pressure         - Bleeding         - Head or belly pain         - Dizziness or trouble seeing, walking or                          Speaking      Problems breathing      Blood in your vomit or you are coughing up blood      A major injury (knocked out, loss of a finger or limb, rape, broken bone protruding from skin)    A mental health crisis. (Or call the Mental Health Crisis line at 1-611.969.8394 or Suicide Prevention Hotline at 1-855.374.7273.)    Open 24 hours every day. You don't need an appointment.     Urgent care    Visit urgent care for sickness or small injuries when the clinic is closed. You don't need an appointment. To check hours or find an urgent care near you, visit [email or web address]. Online care    Get online care from [facility] for more than 70 common problems, like colds, allergies and infections. Open 24 hours every day at:   [email or web address]   Need help deciding?    For advice about where to be seen, you may call your clinic and ask to speak with a nurse. We're here for you 24 hours every day.         If you are deaf or hard of hearing, please let us know. We provide many free services including sign language interpreters, oral interpreters, TTYs, telephone amplifiers, note takers and written materials.                       Follow-ups after your visit        Future tests that were ordered for you today     Open Future Orders        Priority Expected Expires Ordered    *MA Screening Digital Bilateral Routine  1/23/2019 1/23/2018    24 Hour Blood Pressure Monitor - Adult Routine  3/9/2018 1/23/2018            Who to contact     If you have questions or need follow up information about today's clinic visit or your schedule please contact [facility] directly at [phone].  Normal or non-critical lab and imaging results will be communicated to you by MyChart, letter or phone  "within 4 business days after the clinic has received the results. If you do not hear from us within 7 days, please contact the clinic through JustPark or phone. If you have a critical or abnormal lab result, we will notify you by phone as soon as possible.  Submit refill requests through JustPark or call your pharmacy and they will forward the refill request to us. Please allow 3 business days for your refill to be completed.          Additional Information About Your Visit        JustPark Information     JustPark gives you secure access to your electronic health record. If you see a primary care provider, you can also send messages to your care team and make appointments. If you have questions, please call your primary care clinic.  If you do not have a primary care provider, please call [phone] and they will assist you.        Care EveryWhere ID     This is your Care EveryWhere ID. This could be used by other organizations to access your [facility] medical records  [id number]        Your Vitals Were     Pulse Temperature Height BMI (Body Mass Index)          72 99.4  F (37.4  C) (Oral) 5' 3.5\" (1.613 m) 25.7 kg/m2         Blood Pressure from Last 3 Encounters:   01/23/18 150/84   01/05/18 (!) 160/102   12/22/17 (P) 189/82    Weight from Last 3 Encounters:   01/23/18 147 lb 6.4 oz (66.9 kg)   01/05/18 145 lb (65.8 kg)   12/22/17 (P) 145 lb (65.8 kg)                 Today's Medication Changes          These changes are accurate as of: 1/23/18  2:29 PM.  If you have any questions, ask your nurse or doctor.               These medicines have changed or have updated prescriptions.        Dose/Directions    hydrochlorothiazide 25 MG tablet   Commonly known as:  HYDRODIURIL   This may have changed:  See the new instructions.   Used for:  Essential hypertension with goal blood pressure less than 140/90   Changed by:  Savannah Ayala MD        TAKE 1 TABLET(25 MG) BY MOUTH DAILY   Quantity:  90 tablet "   Refills:  0            Where to get your medicines      These medications were sent to [facility] [address]  [place], [place] - [address] [address] [address]  [address] [address] [address]     Phone:  [phone]     hydrochlorothiazide 25 MG tablet                Primary Care Provider Office Phone # Fax #    Savannah Marie Ayala [phone]       [facility] [facility] [address]        Equal Access to Services     [facility]: Hadii aad ku hadasho Soomaali, waaxda luqadaha, qaybta kaalmada adeegyada, waxay idiin hayaan adeeg khbettina etienne . So [facility] [phone].    ATENCIÓN: Si habla español, tiene a dudley disposición servicios gratuitos de asistencia lingüística. [facility] [phone].    We comply with applicable federal civil rights laws and [place] laws. We do not discriminate on the basis of race, color, national origin, age, disability, sex, sexual orientation, or gender identity.            Thank you!     Thank you for choosing [facility]  for your care. Our goal is always to provide you with excellent care. Hearing back from our patients is one way we can continue to improve our services. Please take a few minutes to complete the written survey that you may receive in the mail after your visit with us. Thank you!             Your Updated Medication List - Protect others around you: Learn how to safely use, store and throw away your medicines at www.disposemymeds.org.          This list is accurate as of: 1/23/18  2:29 PM.  Always use your most recent med list.                   Brand Name Dispense Instructions for use Diagnosis    ASACOL 400 MG EC tablet   Generic drug:  mesalamine      4 TABLETS DAILY        benzoyl peroxide 5 % topical gel     60 g    Apply topically daily as needed    Acne, unspecified acne type       CALCIUM + D PO      Take  by mouth. ONCE DAILY.        citalopram 20 MG tablet    celeXA    90 tablet     Take 0.5-1 tablets (10-20 mg) by mouth daily    Anxiety       clotrimazole 1 % cream    LOTRIMIN    60 g    Apply topically 2 times daily    Skin rash       diclofenac 1 % Gel topical gel    VOLTAREN    100 g    Apply 4 grams to knees or 2 grams to hands four times daily using enclosed dosing card.    Knee pain, right       hydrochlorothiazide 25 MG tablet    HYDRODIURIL    90 tablet    TAKE 1 TABLET(25 MG) BY MOUTH DAILY    Essential hypertension with goal blood pressure less than 140/90       MULTIVITAMIN PO      Take  by mouth. ONCE DAILY.        triamcinolone 0.1 % cream    KENALOG    30 g    Apply topically 2 times daily    Skin rash       * UNABLE TO FIND      MEDICATION NAME: Cur Man Vitamin        * UNABLE TO FIND      MEDICATION NAME: Collagen vitamin        * Notice:  This list has 2 medication(s) that are the same as other medications prescribed for you. Read the directions carefully, and ask your doctor or other care provider to review them with you.

## 2018-01-23 NOTE — NURSING NOTE
"Chief Complaint   Patient presents with     Hypertension       Initial /84 (BP Location: Right arm, Patient Position: Sitting, Cuff Size: Adult Regular)  Pulse 72  Temp 99.4  F (37.4  C) (Oral)  Ht 5' 3.5\" (1.613 m)  Wt 147 lb 6.4 oz (66.9 kg)  BMI 25.7 kg/m2 Estimated body mass index is 25.7 kg/(m^2) as calculated from the following:    Height as of this encounter: 5' 3.5\" (1.613 m).    Weight as of this encounter: 147 lb 6.4 oz (66.9 kg).  Medication Reconciliation: complete    "

## 2018-01-26 ENCOUNTER — TELEPHONE (OUTPATIENT)
Dept: FAMILY MEDICINE | Facility: CLINIC | Age: 73
End: 2018-01-26

## 2018-01-26 ENCOUNTER — TELEPHONE (OUTPATIENT)
Dept: CARDIOLOGY | Facility: CLINIC | Age: 73
End: 2018-01-26

## 2018-01-26 NOTE — TELEPHONE ENCOUNTER
Reason for Call:  Other     Detailed comments: Patient seen PCP on 01/23. She was instructed that she should receive a call within 24 hours to schedule a 24hour BP monitor. She instructed the patient to call if she has not been contacted. Patient has not heard anything regarding this appointment.    Phone Number Patient can be reached at: Home number on file [phone] (home)    Best Time: any    Can we leave a detailed message on this number? YES    Call taken on 1/26/2018 at 10:57 AM by Buffy Nick

## 2018-01-26 NOTE — TELEPHONE ENCOUNTER
Attempted to reach patient by phone with no answer.  Left  with phone number for her to set up hook up appt with [place] [facility] at [phone].  Elenita Reyes RN

## 2018-01-28 ENCOUNTER — HEALTH MAINTENANCE LETTER (OUTPATIENT)
Age: 73
End: 2018-01-28

## 2018-02-08 ENCOUNTER — TELEPHONE (OUTPATIENT)
Dept: ORTHOPEDICS | Facility: CLINIC | Age: 73
End: 2018-02-08

## 2018-02-08 NOTE — TELEPHONE ENCOUNTER
LVM for patient to contact clinic with update on her current knee pain following her injections.    Harvey Salas, ATC

## 2018-02-12 NOTE — TELEPHONE ENCOUNTER
LVM for return call to discuss for an update on her right knee pain.  Will assume that she is doing well, if no response in ~ 1 week.    Harvey Salas ATC

## 2018-03-01 ENCOUNTER — RADIANT APPOINTMENT (OUTPATIENT)
Dept: MAMMOGRAPHY | Facility: CLINIC | Age: 73
End: 2018-03-01
Attending: FAMILY MEDICINE
Payer: COMMERCIAL

## 2018-03-01 DIAGNOSIS — Z12.31 VISIT FOR SCREENING MAMMOGRAM: ICD-10-CM

## 2018-03-01 PROCEDURE — 77067 SCR MAMMO BI INCL CAD: CPT | Mod: TC

## 2018-03-14 DIAGNOSIS — F41.9 ANXIETY: ICD-10-CM

## 2018-03-14 NOTE — TELEPHONE ENCOUNTER
"Requested Prescriptions   Pending Prescriptions Disp Refills     citalopram (CELEXA) 20 MG tablet [Pharmacy Med Name: CITALOPRAM 20MG TABLETS]  Last Written Prescription Date:  1/4/2017  Last Fill Quantity: 90 tablet,  # refills: 3   Last office visit: 1/23/2018 with prescribing provider:  GENNARO Ayala   Future Office Visit:   Next 5 appointments (look out 90 days)     Mar 16, 2018  2:00 PM CDT   PHYSICAL with Savannah Ayala MD   [facility] ([address] [id number] [phone]               90 tablet 0     Sig: TAKE 1/2 TO 1 TABLET(10 TO 20 MG) BY MOUTH DAILY    SSRIs Protocol Passed    3/14/2018 11:16 AM  PHQ-9 SCORE 5/29/2015 8/28/2017   Total Score 4 -   Total Score - 3     ESVIN-7 SCORE 1/13/2012 6/1/2015 8/28/2017   Total Score 1 1 -   Total Score - - 4          Passed - Recent (12 mo) or future (30 days) visit within the authorizing provider's specialty    Patient had office visit in the last 12 months or has a visit in the next 30 days with authorizing provider or within the authorizing provider's specialty.  See \"Patient Info\" tab in inbasket, or \"Choose Columns\" in Meds & Orders section of the refill encounter.           Passed - Patient is age 18 or older       Passed - No active pregnancy on record       Passed - No positive pregnancy test in last 12 months          "

## 2018-03-15 RX ORDER — CITALOPRAM HYDROBROMIDE 20 MG/1
TABLET ORAL
Qty: 90 TABLET | Refills: 1 | Status: SHIPPED | OUTPATIENT
Start: 2018-03-15 | End: 2018-05-03

## 2018-05-03 ENCOUNTER — OFFICE VISIT (OUTPATIENT)
Dept: FAMILY MEDICINE | Facility: CLINIC | Age: 73
End: 2018-05-03
Payer: COMMERCIAL

## 2018-05-03 VITALS
TEMPERATURE: 98.9 F | BODY MASS INDEX: 25.53 KG/M2 | OXYGEN SATURATION: 95 % | WEIGHT: 146.4 LBS | DIASTOLIC BLOOD PRESSURE: 80 MMHG | SYSTOLIC BLOOD PRESSURE: 130 MMHG | RESPIRATION RATE: 25 BRPM | HEART RATE: 91 BPM

## 2018-05-03 DIAGNOSIS — F41.9 ANXIETY: Primary | ICD-10-CM

## 2018-05-03 DIAGNOSIS — E78.5 HYPERLIPIDEMIA LDL GOAL <160: ICD-10-CM

## 2018-05-03 DIAGNOSIS — I10 ESSENTIAL HYPERTENSION WITH GOAL BLOOD PRESSURE LESS THAN 140/90: ICD-10-CM

## 2018-05-03 DIAGNOSIS — M17.0 PRIMARY OSTEOARTHRITIS OF BOTH KNEES: ICD-10-CM

## 2018-05-03 DIAGNOSIS — Z71.89 ADVANCED DIRECTIVES, COUNSELING/DISCUSSION: ICD-10-CM

## 2018-05-03 PROCEDURE — 99214 OFFICE O/P EST MOD 30 MIN: CPT | Performed by: FAMILY MEDICINE

## 2018-05-03 RX ORDER — CITALOPRAM HYDROBROMIDE 20 MG/1
20 TABLET ORAL DAILY
Qty: 90 TABLET | Refills: 1 | Status: SHIPPED | OUTPATIENT
Start: 2018-05-03 | End: 2019-04-27

## 2018-05-03 RX ORDER — HYDROCHLOROTHIAZIDE 25 MG/1
TABLET ORAL
Qty: 90 TABLET | Refills: 0 | Status: SHIPPED | OUTPATIENT
Start: 2018-05-03 | End: 2019-02-11

## 2018-05-03 ASSESSMENT — ANXIETY QUESTIONNAIRES
7. FEELING AFRAID AS IF SOMETHING AWFUL MIGHT HAPPEN: NOT AT ALL
3. WORRYING TOO MUCH ABOUT DIFFERENT THINGS: SEVERAL DAYS
6. BECOMING EASILY ANNOYED OR IRRITABLE: NOT AT ALL
2. NOT BEING ABLE TO STOP OR CONTROL WORRYING: SEVERAL DAYS
IF YOU CHECKED OFF ANY PROBLEMS ON THIS QUESTIONNAIRE, HOW DIFFICULT HAVE THESE PROBLEMS MADE IT FOR YOU TO DO YOUR WORK, TAKE CARE OF THINGS AT HOME, OR GET ALONG WITH OTHER PEOPLE: SOMEWHAT DIFFICULT
1. FEELING NERVOUS, ANXIOUS, OR ON EDGE: SEVERAL DAYS
5. BEING SO RESTLESS THAT IT IS HARD TO SIT STILL: NOT AT ALL
GAD7 TOTAL SCORE: 4

## 2018-05-03 ASSESSMENT — PATIENT HEALTH QUESTIONNAIRE - PHQ9: 5. POOR APPETITE OR OVEREATING: SEVERAL DAYS

## 2018-05-03 ASSESSMENT — PAIN SCALES - GENERAL: PAINLEVEL: NO PAIN (0)

## 2018-05-03 NOTE — PATIENT INSTRUCTIONS
Let's increase your celexa up to one full tablet (20mg) daily to help reduce your anxiety symptoms.    Dr. Cardenas is at our [place] offices - for your knees.  The orthopedic schedule can be reached at [phone]    [facility]   Discharged by : Harmony WHITMAN MA    If you have any questions regarding your visit please contact your care team:     Team Gold                Clinic Hours Telephone Number     Dr. Hazel Avila, NP 7am-7pm  Monday - Thursday   7am-5pm  Fridays  [phone]   (Appointment scheduling available 24/7)     RN Line  [phone] option 2     Urgent Care - [place] - 11am-9pm Monday-Friday Saturday-Sunday- 9am-5pm     [place] -   5pm-9pm Monday-Friday Saturday-Sunday- 9am-5pm    [phone] - [place]    [phone] - [place]       For a Price Quote for your services, please call our Consumer Price Line at [phone].     What options do I have for visits at the clinic other than the traditional office visit?     To expand how we care for you, many of our providers are utilizing electronic visits (e-visits) and telephone visits, when medically appropriate, for interactions with their patients rather than a visit in the clinic. We also offer nurse visits for many medical concerns. Just like any other service, we will bill your insurance company for this type of visit based on time spent on the phone with your provider. Not all insurance companies cover these visits. Please check with your medical insurance if this type of visit is covered. You will be responsible for any charges that are not paid by your insurance.   E-visits via Phraxis: generally incur a $35.00 fee.     Telephone visits:  Time spent on the phone: *charged based on time that is spent on the phone in increments of 10 minutes. Estimated cost:   5-10 mins $30.00   11-20  mins. $59.00   21-30 mins. $85.00       Use Novaliq (secure email communication and access to your chart) to send your primary care provider a message or make an appointment. Ask someone on your Team how to sign up for Novaliq.     As always, Thank you for trusting us with your health care needs!      [facility]:    Scheduling Appointments  Jessi Black [facility]  Call: [phone]    [place]  Call: [phone]    [facility]  Call: [phone]    For Gastroenterology referrals   [facility] Gastroenterology   Clinics and Surgery Center, 4th Floor   [address] [place] [address]   Appointments: [phone]    WHERE TO GO FOR CARE?  Clinic    Make an appointment if you:       Are sick (cold, cough, flu, sore throat, earache or in pain).       Have a small injury (sprain, small cut, burn or broken bone).       Need a physical exam, Pap smear, vaccine or prescription refill.       Have questions about your health or medicines.    To reach us:      Call [phone] ([phone]). Open 24 hours every day. (For counseling services, call [phone].)    Log into Novaliq at [email or web address]. (Visit [email or web address] to create an account.) Hospital emergency room    An emergency is a serious or life- threatening problem that must be treated right away.    Call 175 or get to the hospital if you have:      Very bad or sudden:            - Chest pain or pressure         - Bleeding         - Head or belly pain         - Dizziness or trouble seeing, walking or                          Speaking      Problems breathing      Blood in your vomit or you are coughing up blood      A major injury (knocked out, loss of a finger or limb, rape, broken bone protruding from skin)    A mental health crisis. (Or call the Mental Health Crisis line at 1-290.841.1985 or Suicide Prevention Hotline at 1-894.969.4095.)    Open 24 hours  every day. You don't need an appointment.     Urgent care    Visit urgent care for sickness or small injuries when the clinic is closed. You don't need an appointment. To check hours or find an urgent care near you, visit [email or web address]. Online care    Get online care from [facility] for more than 70 common problems, like colds, allergies and infections. Open 24 hours every day at:   [email or web address]   Need help deciding?    For advice about where to be seen, you may call your clinic and ask to speak with a nurse. We're here for you 24 hours every day.         If you are deaf or hard of hearing, please let us know. We provide many free services including sign language interpreters, oral interpreters, TTYs, telephone amplifiers, note takers and written materials.

## 2018-05-03 NOTE — MR AVS SNAPSHOT
After Visit Summary   5/3/2018    Julia Tabares    MRN: [id number]           Patient Information     Date Of Birth          1945        Visit Information        Provider Department      5/3/2018 1:40 PM Savannah Ayala MD [facility]        Today's Diagnoses     Anxiety    -  1    Essential hypertension with goal blood pressure less than 140/90        Hyperlipidemia LDL goal <160        Primary osteoarthritis of both knees        Advanced directives, counseling/discussion          Care Instructions    Let's increase your celexa up to one full tablet (20mg) daily to help reduce your anxiety symptoms.    Dr. Cardenas is at our [facility] offices - for your knees.  The orthopedic schedule can be reached at [phone]    [facility]   Discharged by : Harmony WHITMAN MA    If you have any questions regarding your visit please contact your care team:     Team Gold                Clinic Hours Telephone Number     Dr. Hazel Avila, NP 7am-7pm  Monday - Thursday   7am-5pm  Fridays  [phone]   (Appointment scheduling available 24/7)     RN Line  [phone] option 2     Urgent Care - [place] and [facility] - 11am-9pm Monday-Friday Saturday-Sunday- 9am-5pm     [place] -   5pm-9pm Monday-Friday Saturday-Sunday- 9am-5pm    [phone] - [place]    [phone] - [place]       For a Price Quote for your services, please call our Consumer Price Line at [phone].     What options do I have for visits at the clinic other than the traditional office visit?     To expand how we care for you, many of our providers are utilizing electronic visits (e-visits) and telephone visits, when medically appropriate, for interactions with their patients rather than a visit in the clinic. We also offer nurse visits for many medical concerns. Just  like any other service, we will bill your insurance company for this type of visit based on time spent on the phone with your provider. Not all insurance companies cover these visits. Please check with your medical insurance if this type of visit is covered. You will be responsible for any charges that are not paid by your insurance.   E-visits via Greengro Technologieshart: generally incur a $35.00 fee.     Telephone visits:  Time spent on the phone: *charged based on time that is spent on the phone in increments of 10 minutes. Estimated cost:   5-10 mins $30.00   11-20 mins. $59.00   21-30 mins. $85.00       Use RentMatch (secure email communication and access to your chart) to send your primary care provider a message or make an appointment. Ask someone on your Team how to sign up for RentMatch.     As always, Thank you for trusting us with your health care needs!      [facility]:    Scheduling Appointments  Jessi Black [facility]  Call: [phone]    [facility]  Call: [phone]    [facility]  Call: [phone]    For Gastroenterology referrals   [facility] Gastroenterology   Clinics and Surgery Center, 4th Floor   [address] [place] [address]   Appointments: [phone]    WHERE TO GO FOR CARE?  Clinic    Make an appointment if you:       Are sick (cold, cough, flu, sore throat, earache or in pain).       Have a small injury (sprain, small cut, burn or broken bone).       Need a physical exam, Pap smear, vaccine or prescription refill.       Have questions about your health or medicines.    To reach us:      Call [phone] ([phone]). Open 24 hours every day. (For counseling services, call [phone].)    Log into RentMatch at [email or web address]. (Visit [email or web address] to create an account.) Hospital emergency room    An emergency is a serious or life- threatening problem that must be treated right away.    Call  911 or get to the hospital if you have:      Very bad or sudden:            - Chest pain or pressure         - Bleeding         - Head or belly pain         - Dizziness or trouble seeing, walking or                          Speaking      Problems breathing      Blood in your vomit or you are coughing up blood      A major injury (knocked out, loss of a finger or limb, rape, broken bone protruding from skin)    A mental health crisis. (Or call the Mental Health Crisis line at 1-777.971.2675 or Suicide Prevention Hotline at 1-529.273.2833.)    Open 24 hours every day. You don't need an appointment.     Urgent care    Visit urgent care for sickness or small injuries when the clinic is closed. You don't need an appointment. To check hours or find an urgent care near you, visit [email or web address]. Online care    Get online care from [facility] for more than 70 common problems, like colds, allergies and infections. Open 24 hours every day at:   [email or web address]   Need help deciding?    For advice about where to be seen, you may call your clinic and ask to speak with a nurse. We're here for you 24 hours every day.         If you are deaf or hard of hearing, please let us know. We provide many free services including sign language interpreters, oral interpreters, TTYs, telephone amplifiers, note takers and written materials.                   Follow-ups after your visit        Future tests that were ordered for you today     Open Future Orders        Priority Expected Expires Ordered    Basic metabolic panel Routine 7/3/2018 5/3/2019 5/3/2018    Lipid panel reflex to direct LDL Fasting Routine 7/2/2018 8/31/2018 5/3/2018            Who to contact     If you have questions or need follow up information about today's clinic visit or your schedule please contact [facility] directly at [phone].  Normal or non-critical lab and imaging results will be communicated to you by MyChart, letter or phone within 4  business days after the clinic has received the results. If you do not hear from us within 7 days, please contact the clinic through SEVENROOMS or phone. If you have a critical or abnormal lab result, we will notify you by phone as soon as possible.  Submit refill requests through SEVENROOMS or call your pharmacy and they will forward the refill request to us. Please allow 3 business days for your refill to be completed.          Additional Information About Your Visit        SEVENROOMS Information     SEVENROOMS gives you secure access to your electronic health record. If you see a primary care provider, you can also send messages to your care team and make appointments. If you have questions, please call your primary care clinic.  If you do not have a primary care provider, please call [phone] and they will assist you.        Care EveryWhere ID     This is your Care EveryWhere ID. This could be used by other organizations to access your [facility] medical records  [id number]        Your Vitals Were     Pulse Temperature Respirations Pulse Oximetry BMI (Body Mass Index)       91 98.9  F (37.2  C) (Oral) 25 95% 25.53 kg/m2        Blood Pressure from Last 3 Encounters:   05/03/18 130/80   01/23/18 134/82   01/05/18 (!) 160/102    Weight from Last 3 Encounters:   05/03/18 146 lb 6.4 oz (66.4 kg)   01/23/18 147 lb 6.4 oz (66.9 kg)   01/05/18 145 lb (65.8 kg)                 Today's Medication Changes          These changes are accurate as of 5/3/18  2:37 PM.  If you have any questions, ask your nurse or doctor.               These medicines have changed or have updated prescriptions.        Dose/Directions    citalopram 20 MG tablet   Commonly known as:  celeXA   This may have changed:  See the new instructions.   Used for:  Anxiety   Changed by:  Savannah Ayala MD        Dose:  20 mg   Take 1 tablet (20 mg) by mouth daily   Quantity:  90 tablet   Refills:  1            Where to get your medicines      These  medications were sent to [facility] [address]  [place], [place] - [address]  [address] [place] [address]     Phone:  [phone]     citalopram 20 MG tablet    hydrochlorothiazide 25 MG tablet                Primary Care Provider Office Phone # Fax #    Savannah Marie Ayala [phone]       [facility] [facility] [address]        Equal Access to Services     [facility]: Hadii aad ku hadasho Soomaali, waaxda luqadaha, qaybta kaalmada adeegyada, waxay idiin hayaan adeeg kharamarcos etienne . So [facility] [phone].    ATENCIÓN: Si habla español, tiene a dudley disposición servicios gratuitos de asistencia lingüística. [facility] [phone].    We comply with applicable federal civil rights laws and [place] laws. We do not discriminate on the basis of race, color, national origin, age, disability, sex, sexual orientation, or gender identity.            Thank you!     Thank you for choosing [facility]  for your care. Our goal is always to provide you with excellent care. Hearing back from our patients is one way we can continue to improve our services. Please take a few minutes to complete the written survey that you may receive in the mail after your visit with us. Thank you!             Your Updated Medication List - Protect others around you: Learn how to safely use, store and throw away your medicines at www.disposemymeds.org.          This list is accurate as of 5/3/18  2:37 PM.  Always use your most recent med list.                   Brand Name Dispense Instructions for use Diagnosis    ASACOL 400 MG EC tablet   Generic drug:  mesalamine      4 TABLETS DAILY/ using every other day        benzoyl peroxide 5 % topical gel     60 g    Apply topically daily as needed    Acne, unspecified acne type       CALCIUM + D PO      Take  by mouth. ONCE DAILY.        citalopram 20 MG tablet    celeXA    90 tablet    Take 1  tablet (20 mg) by mouth daily    Anxiety       clotrimazole 1 % cream    LOTRIMIN    60 g    Apply topically 2 times daily    Skin rash       diclofenac 1 % Gel topical gel    VOLTAREN    100 g    Apply 4 grams to knees or 2 grams to hands four times daily using enclosed dosing card.    Knee pain, right       hydrochlorothiazide 25 MG tablet    HYDRODIURIL    90 tablet    TAKE 1 TABLET(25 MG) BY MOUTH DAILY    Essential hypertension with goal blood pressure less than 140/90       MULTIVITAMIN PO      Take  by mouth. ONCE DAILY.        triamcinolone 0.1 % cream    KENALOG    30 g    Apply topically 2 times daily    Skin rash       UNABLE TO FIND      MEDICATION NAME: Cur Man Vitamin        UNABLE TO FIND      MEDICATION NAME: Collagen vitamin

## 2018-05-03 NOTE — PROGRESS NOTES
"  SUBJECTIVE:   Julia Tabares is a 72 year old female who presents to clinic today for the following health issues:      Hyperlipidemia Follow-Up      Rate your low fat/cholesterol diet?: good    Taking statin?  No    Other lipid medications/supplements?:  none  Not eating much lately     Hypertension Follow-up      Outpatient blood pressures are being checked at home.  Results are 140's over 90's .    Low Salt Diet: low salt    Depression Followup    Status since last visit: Stable     See PHQ-9 for current symptoms.  Other associated symptoms: anxiety lastley     Complicating factors:   Significant life event:  No   Current substance abuse:  None  Anxiety or Panic symptoms:  Yes-  anxieity and panic     PHQ-9 8/28/2017   Total Score 3   Q9: Suicide Ideation Not at all     PHQ-9  English  PHQ-9   Any Language  Suicide Assessment Five-step Evaluation and Treatment (SAFE-T)    Amount of exercise or physical activity: 2-3 days/week for an average of greater than 60 minutes    Problems taking medications regularly: No    Medication side effects: not sure - gets dry mouth     Diet: low salt and low fat/cholesterol      Anxiety: She endorses some anxiety that causes her heart to race. If she relaxes and talks herself down this resolves. She states that she is hard on herself and has some difficulty remembering things during workout routines. Patient is taking 1/2 tab of citalopram at night. Although notes her  just went up to 20 mg of celexa and he feels better.    Knees: She endorses having osteoarthritis in her knees. Patient had cortisone injection 5 months ago that was helpful. She feels like her legs are \"heavy like concrete\" and notices it most after exercising. She has seen Dr. Solorzano [facility], last on 1/5/18 who recommended consideration of surgery.  But then she saw [facility] - who didn't recommend surgery.  She would like to see an ortho in the  system.     Skin: Patient also endorses spider " veins.     Exercise: Patient is an  for seniors and really enjoys this.     Meds: Patient has run out of HCTZ.    Other: Patient is slightly skeptical of medications after her daughter had a bad reaction from using Cipro.       Problem list and histories reviewed & adjusted, as indicated.  Additional history: as documented    Patient Active Problem List   Diagnosis     Crohn's colitis (H)     Anxiety     Postmenopausal atrophic vaginitis     Advanced directives, counseling/discussion     Hyperlipidemia LDL goal <160     Left knee DJD     CTS (carpal tunnel syndrome)     Osteoporosis     Essential hypertension with goal blood pressure less than 140/90     History of gastroesophageal reflux (GERD)     Hiatal hernia     Past Surgical History:   Procedure Laterality Date     C/SECTION, LOW TRANSVERSE  x3    , Low Transverse     HC REDUCTION OF LARGE BREAST         Social History   Substance Use Topics     Smoking status: Former Smoker     Quit date: 10/13/1992     Smokeless tobacco: Never Used     Alcohol use No     Family History   Problem Relation Age of Onset     CANCER Mother      Hypertension Mother      HEART DISEASE Father      DIABETES No family hx of          Current Outpatient Prescriptions   Medication Sig Dispense Refill     benzoyl peroxide 5 % topical gel Apply topically daily as needed 60 g 1     Calcium-Vitamin D (CALCIUM + D PO) Take  by mouth. ONCE DAILY.        citalopram (CELEXA) 20 MG tablet TAKE 1/2 TO 1 TABLET(10 TO 20 MG) BY MOUTH DAILY 90 tablet 1     clotrimazole (LOTRIMIN) 1 % cream Apply topically 2 times daily 60 g 1     diclofenac (VOLTAREN) 1 % GEL Apply 4 grams to knees or 2 grams to hands four times daily using enclosed dosing card. 100 g 1     hydrochlorothiazide (HYDRODIURIL) 25 MG tablet TAKE 1 TABLET(25 MG) BY MOUTH DAILY 90 tablet 0     mesalamine (ASACOL) 400 MG EC tablet 4 TABLETS DAILY/ using every other day       Multiple Vitamin (MULTIVITAMIN OR)  Take  by mouth. ONCE DAILY.        triamcinolone (KENALOG) 0.1 % cream Apply topically 2 times daily 30 g 1     UNABLE TO FIND MEDICATION NAME: Cur Man Vitamin       UNABLE TO FIND MEDICATION NAME: Collagen vitamin       No Known Allergies    Reviewed and updated as needed this visit by clinical staff  Tobacco  Allergies  Meds       Reviewed and updated as needed this visit by Provider  Allergies  Meds  Problems       ROS:  Constitutional, HEENT, cardiovascular, pulmonary, gi and gu systems are negative, except as otherwise noted.    This document serves as a record of the services and decisions personally performed and made by Savannah Ayala MD. It was created on her behalf by Ambar Herbert, a trained medical scribe. The creation of this document is based the provider's statements to the medical scribe.    Ambar Herbert May 3, 2018 2:10 PM  OBJECTIVE:     /80 (BP Location: Right arm, Patient Position: Chair, Cuff Size: Adult Regular)  Pulse 91  Temp 98.9  F (37.2  C) (Oral)  Resp 25  Wt 66.4 kg (146 lb 6.4 oz)  SpO2 95%  BMI 25.53 kg/m2  Body mass index is 25.53 kg/(m^2).  GENERAL: healthy, alert and no distress  RESP: lungs clear to auscultation - no rales, rhonchi or wheezes  CV: regular rate and rhythm, normal S1 S2, no S3 or S4, no murmur, click or rub, no peripheral edema and peripheral pulses strong  PSYCH: mentation appears normal, affect normal/bright    Diagnostic Test Results:  No results found for this or any previous visit (from the past 24 hour(s)).    ASSESSMENT/PLAN:   (F41.9) Anxiety  (primary encounter diagnosis)  Comment: we will increase from 10 mg up to 20 mg  Plan: citalopram (CELEXA) 20 MG tablet        Patient will notify me if medication adjustment is not helpful.  If not, then we will consider lexapro or zoloft.    (I10) Essential hypertension with goal blood pressure less than 140/90  Comment: BP is well controlled today.   Plan: hydrochlorothiazide  (HYDRODIURIL) 25 MG tablet,        Basic metabolic panel        Continue current medications.    (E78.5) Hyperlipidemia LDL goal <160  Comment: was previously elevated, however that was 9 months ago  Plan: Lipid panel reflex to direct LDL Fasting        Will recheck panel.  If ASCVD risk score is still above 7.5% will consider statin.    (M17.0) Primary osteoarthritis of both knees  Comment: injection helped.  But she is still bothered by some pain -with increased exercise or after prolonged sitting  Plan: desires to see ortho  Contact info provided.    (Z71.89) Advanced directives, counseling/discussion  Comment: Discussed with patient.   Plan: She will fill out and return it to clinic.      Patient Instructions     Let's increase your celexa up to one full tablet (20mg) daily to help reduce your anxiety symptoms.    Dr. Cardenas is at our [place] and [place] offices - for your knees.  The orthopedic schedule can be reached at [phone]    [facility]   Discharged by : Harmony WHITMAN MA    If you have any questions regarding your visit please contact your care team:     Team Gold                Clinic Hours Telephone Number     Dr. Hazel Avila, NP 7am-7pm  Monday - Thursday   7am-5pm  Fridays  [phone]   (Appointment scheduling available 24/7)     RN Line  [phone] option 2     Urgent Care - [place] [facility] and [place] [facility] - 11am-9pm Monday-Friday Saturday-Sunday- 9am-5pm     [place] -   5pm-9pm Monday-Friday Saturday-Sunday- 9am-5pm    [phone] - [place]    [phone] - [place]       For a Price Quote for your services, please call our Consumer Price Line at [phone].     What options do I have for visits at the clinic other than the traditional office visit?     To expand how we care for you, many of our providers are utilizing electronic visits (e-visits) and telephone  visits, when medically appropriate, for interactions with their patients rather than a visit in the clinic. We also offer nurse visits for many medical concerns. Just like any other service, we will bill your insurance company for this type of visit based on time spent on the phone with your provider. Not all insurance companies cover these visits. Please check with your medical insurance if this type of visit is covered. You will be responsible for any charges that are not paid by your insurance.   E-visits via [employer]: generally incur a $35.00 fee.     Telephone visits:  Time spent on the phone: *charged based on time that is spent on the phone in increments of 10 minutes. Estimated cost:   5-10 mins $30.00   11-20 mins. $59.00   21-30 mins. $85.00       Use [employer] (secure email communication and access to your chart) to send your primary care provider a message or make an appointment. Ask someone on your Team how to sign up for [employer].     As always, Thank you for trusting us with your health care needs!      [facility]:    Scheduling Appointments  [place] [facility]  Call: [phone]    [place] [facility]  Call: [phone]    [facility]  Call: [phone]    For Gastroenterology referrals   [facility]   Clinics and Surgery Center, 4th Floor   [address] [place] [id number]   Appointments: [phone]    WHERE TO GO FOR CARE?  Clinic    Make an appointment if you:       Are sick (cold, cough, flu, sore throat, earache or in pain).       Have a small injury (sprain, small cut, burn or broken bone).       Need a physical exam, Pap smear, vaccine or prescription refill.       Have questions about your health or medicines.    To reach us:      Call [phone] ([phone]). Open 24 hours every day. (For counseling services, call [phone].)    Log into [employer] at [email or web address]. (Visit  [email or web address] to create an account.) Hospital emergency room    An emergency is a serious or life- threatening problem that must be treated right away.    Call 911 or get to the hospital if you have:      Very bad or sudden:            - Chest pain or pressure         - Bleeding         - Head or belly pain         - Dizziness or trouble seeing, walking or                          Speaking      Problems breathing      Blood in your vomit or you are coughing up blood      A major injury (knocked out, loss of a finger or limb, rape, broken bone protruding from skin)    A mental health crisis. (Or call the Mental Health Crisis line at 1-773.263.6247 or Suicide Prevention Hotline at 1-801.690.8350.)    Open 24 hours every day. You don't need an appointment.     Urgent care    Visit urgent care for sickness or small injuries when the clinic is closed. You don't need an appointment. To check hours or find an urgent care near you, visit [email or web address]. Online care    Get online care from [facility] for more than 70 common problems, like colds, allergies and infections. Open 24 hours every day at:   [email or web address]   Need help deciding?    For advice about where to be seen, you may call your clinic and ask to speak with a nurse. We're here for you 24 hours every day.         If you are deaf or hard of hearing, please let us know. We provide many free services including sign language interpreters, oral interpreters, TTYs, telephone amplifiers, note takers and written materials.                   Savannah Ayala MD  [facility]    The information in this document, created by the medical scribe for me, accurately reflects the services I personally performed and the decisions made by me. I have reviewed and approved this document for accuracy prior to leaving the patient care area.

## 2018-05-04 ASSESSMENT — PATIENT HEALTH QUESTIONNAIRE - PHQ9: SUM OF ALL RESPONSES TO PHQ QUESTIONS 1-9: 5

## 2018-05-04 ASSESSMENT — ANXIETY QUESTIONNAIRES: GAD7 TOTAL SCORE: 4

## 2018-05-18 DIAGNOSIS — I10 ESSENTIAL HYPERTENSION WITH GOAL BLOOD PRESSURE LESS THAN 140/90: ICD-10-CM

## 2018-05-18 DIAGNOSIS — E78.5 HYPERLIPIDEMIA LDL GOAL <160: ICD-10-CM

## 2018-05-18 PROCEDURE — 80061 LIPID PANEL: CPT | Performed by: FAMILY MEDICINE

## 2018-05-18 PROCEDURE — 80048 BASIC METABOLIC PNL TOTAL CA: CPT | Performed by: FAMILY MEDICINE

## 2018-05-18 PROCEDURE — 36415 COLL VENOUS BLD VENIPUNCTURE: CPT | Performed by: FAMILY MEDICINE

## 2018-05-19 LAB
ANION GAP SERPL CALCULATED.3IONS-SCNC: 6 MMOL/L (ref 3–14)
BUN SERPL-MCNC: 16 MG/DL (ref 7–30)
CALCIUM SERPL-MCNC: 9.2 MG/DL (ref 8.5–10.1)
CHLORIDE SERPL-SCNC: 103 MMOL/L (ref 94–109)
CHOLEST SERPL-MCNC: 204 MG/DL
CO2 SERPL-SCNC: 31 MMOL/L (ref 20–32)
CREAT SERPL-MCNC: 0.71 MG/DL (ref 0.52–1.04)
GFR SERPL CREATININE-BSD FRML MDRD: 82 ML/MIN/1.7M2
GLUCOSE SERPL-MCNC: 106 MG/DL (ref 70–99)
HDLC SERPL-MCNC: 58 MG/DL
LDLC SERPL CALC-MCNC: 133 MG/DL
NONHDLC SERPL-MCNC: 146 MG/DL
POTASSIUM SERPL-SCNC: 3.7 MMOL/L (ref 3.4–5.3)
SODIUM SERPL-SCNC: 140 MMOL/L (ref 133–144)
TRIGL SERPL-MCNC: 66 MG/DL

## 2018-05-21 ENCOUNTER — TELEPHONE (OUTPATIENT)
Dept: FAMILY MEDICINE | Facility: CLINIC | Age: 73
End: 2018-05-21

## 2018-05-21 DIAGNOSIS — E78.00 HIGH BLOOD CHOLESTEROL: Primary | ICD-10-CM

## 2018-05-21 NOTE — LETTER
[address] [id number] [phone]      September 24, 2018      Julia Tabares  [address] [place] [id number]          Dear Julia Tabares:    This is to remind you that your provider wanted you to return to the clinic for lab testing.    If you are coming in for Lipids and/or Glucose testing please fast for 10-12 hours. Morning medications can be taken with water.    You may call our office at [phone] to schedule an appointment.    Please disregard this notice if you have already had your labs drawn or made an            appointment.        Sincerely,    aHzel Irving MD

## 2018-05-22 RX ORDER — SIMVASTATIN 20 MG
20 TABLET ORAL AT BEDTIME
Qty: 90 TABLET | Refills: 0 | Status: SHIPPED | OUTPATIENT
Start: 2018-05-22 | End: 2019-01-21

## 2018-05-22 NOTE — TELEPHONE ENCOUNTER
Left a detailed VM with our call back number to confirm patient is okay with Simvastatin. Pended labs.   Trina Card RN

## 2018-05-22 NOTE — TELEPHONE ENCOUNTER
Patient is returning call. Patient is fine with going on med.    Please send to Joe/[phone] [phone].    Perla Smith

## 2018-05-22 NOTE — TELEPHONE ENCOUNTER
Please contact patient regarding lab results.  Kidneys and potassium are normal.  Blood sugar was just a little bit elevated at 106 fasting.    Cholesterol is actually improved.  But her risk score is still at 10%.  Above 7.5% (risk of cardiac event in next 10 years) is considered to be enough of a risk to consider using statin medication.    From 5/3/18 office visit with me  (E78.5) Hyperlipidemia LDL goal <160  Comment: was previously elevated, however that was 9 months ago  Plan: Lipid panel reflex to direct LDL Fasting        Will recheck panel.  If ASCVD risk score is still above 7.5% will consider statin.    So it Is reasonable at this point to begin a low dose statin medication.  Is the patient agreeable to that plan?  If so, I would recommend we begin simvastatin 20 mg daily and recheck liver and lipid test in two months.

## 2018-05-22 NOTE — TELEPHONE ENCOUNTER
Reason for call:  Results   Name of test or procedure: blood work, cholesterol  Date of test or procedure: 05/14/2018-approximate date  Location of test or procedure: [place]    Additional comments: would like a call when test results are in, trouble using [employer].    Phone number to reach patient:  Home number on file [phone] (home)    Best Time:  anytime    Can we leave a detailed message on this number?  YES

## 2018-05-29 DIAGNOSIS — I10 ESSENTIAL HYPERTENSION WITH GOAL BLOOD PRESSURE LESS THAN 140/90: ICD-10-CM

## 2018-05-30 RX ORDER — HYDROCHLOROTHIAZIDE 25 MG/1
TABLET ORAL
Qty: 90 TABLET | Refills: 1 | Status: SHIPPED | OUTPATIENT
Start: 2018-05-30 | End: 2019-02-11

## 2018-05-30 NOTE — TELEPHONE ENCOUNTER
"Requested Prescriptions   Pending Prescriptions Disp Refills     hydrochlorothiazide (HYDRODIURIL) 25 MG tablet [Pharmacy Med Name: HYDROCHLOROTHIAZIDE 25MG TABLETS]  Last Written Prescription Date:  5/3/2018  Last Fill Quantity: 90 tabs,  # refills: 0   Last office visit: 5/3/2018 with prescribing provider:  Jamie   Future Office Visit:     30 tablet 0     Sig: TAKE 1 TABLET(25 MG) BY MOUTH DAILY    Diuretics (Including Combos) Protocol Passed    5/29/2018  6:08 PM       Passed - Blood pressure under 140/90 in past 12 months    BP Readings from Last 3 Encounters:   05/03/18 130/80   01/23/18 134/82   01/05/18 (!) 160/102                Passed - Recent (12 mo) or future (30 days) visit within the authorizing provider's specialty    Patient had office visit in the last 12 months or has a visit in the next 30 days with authorizing provider or within the authorizing provider's specialty.  See \"Patient Info\" tab in inbasket, or \"Choose Columns\" in Meds & Orders section of the refill encounter.           Passed - Patient is age 18 or older       Passed - No active pregancy on record       Passed - Normal serum creatinine on file in past 12 months    Recent Labs   Lab Test  05/18/18   0946   CR  0.71             Passed - Normal serum potassium on file in past 12 months    Recent Labs   Lab Test  05/18/18   0946   POTASSIUM  3.7                   Passed - Normal serum sodium on file in past 12 months    Recent Labs   Lab Test  05/18/18   0946   NA  140             Passed - No positive pregnancy test in past 12 months          "

## 2018-05-30 NOTE — TELEPHONE ENCOUNTER
Prescription approved per [facility] Refill Protocol.  Magali Barrera,Clinic Rn  [place]

## 2018-10-04 ENCOUNTER — TRANSFERRED RECORDS (OUTPATIENT)
Dept: HEALTH INFORMATION MANAGEMENT | Facility: CLINIC | Age: 73
End: 2018-10-04

## 2018-11-19 ENCOUNTER — TRANSFERRED RECORDS (OUTPATIENT)
Dept: HEALTH INFORMATION MANAGEMENT | Facility: CLINIC | Age: 73
End: 2018-11-19

## 2019-01-03 ENCOUNTER — TELEPHONE (OUTPATIENT)
Dept: FAMILY MEDICINE | Facility: CLINIC | Age: 74
End: 2019-01-03

## 2019-01-03 DIAGNOSIS — E78.5 HYPERLIPIDEMIA LDL GOAL <160: Primary | ICD-10-CM

## 2019-01-03 NOTE — TELEPHONE ENCOUNTER
Patient was started on simvastatin in May and advised to return in two months for liver tests and fasting cholesterol test.  I see that patient has not returned for lab testing.  Is she still taking the medication?  If not please get more info.    If so, please advise lab visit.

## 2019-01-03 NOTE — TELEPHONE ENCOUNTER
Left message on voicemail asking patient to call back to discuss if she is taking her simvastatin. If she is she is overdue for labs. Dr. Irving would like her to come in for these.  Son Liu RN

## 2019-01-09 NOTE — TELEPHONE ENCOUNTER
We have two options  #1 - she could restart and then we should check labs in two months  Or #2 we could first recheck labs to confirm that her cholesterol is still elevated at such a level that would require treatment.

## 2019-01-09 NOTE — TELEPHONE ENCOUNTER
2nd attempt.  Patient/family was instructed to return call to [facility] RN directly on the RN Call back line at [phone].     Elenita Reyes RN

## 2019-01-09 NOTE — TELEPHONE ENCOUNTER
Route to PCP to advise please. Would you like her to resume simvastatin, and how long after this should she come in for her labs?  Patient returns call to clinic, states she had taken about 2 months worth of medication, then had stopped. She has Crohn's Disease and had a flare-up of colitis, was taking medication for this and wasn't sure if she should keep taking the simvastatin, so stopped.  She still has about a month worth in the home, is agreeable to restarting this if PCP would like her to.      Elenita Reyes RN

## 2019-01-18 DIAGNOSIS — E78.00 HIGH BLOOD CHOLESTEROL: ICD-10-CM

## 2019-01-18 DIAGNOSIS — E78.5 HYPERLIPIDEMIA LDL GOAL <160: ICD-10-CM

## 2019-01-18 PROCEDURE — 36415 COLL VENOUS BLD VENIPUNCTURE: CPT | Performed by: FAMILY MEDICINE

## 2019-01-18 PROCEDURE — 80076 HEPATIC FUNCTION PANEL: CPT | Performed by: FAMILY MEDICINE

## 2019-01-18 PROCEDURE — 80061 LIPID PANEL: CPT | Performed by: FAMILY MEDICINE

## 2019-01-19 LAB
ALBUMIN SERPL-MCNC: 3.7 G/DL (ref 3.4–5)
ALP SERPL-CCNC: 59 U/L (ref 40–150)
ALT SERPL W P-5'-P-CCNC: 23 U/L (ref 0–50)
AST SERPL W P-5'-P-CCNC: 16 U/L (ref 0–45)
BILIRUB DIRECT SERPL-MCNC: 0.1 MG/DL (ref 0–0.2)
BILIRUB SERPL-MCNC: 0.5 MG/DL (ref 0.2–1.3)
CHOLEST SERPL-MCNC: 249 MG/DL
HDLC SERPL-MCNC: 70 MG/DL
LDLC SERPL CALC-MCNC: 167 MG/DL
NONHDLC SERPL-MCNC: 179 MG/DL
PROT SERPL-MCNC: 7.7 G/DL (ref 6.8–8.8)
TRIGL SERPL-MCNC: 59 MG/DL

## 2019-01-21 ENCOUNTER — TELEPHONE (OUTPATIENT)
Dept: FAMILY MEDICINE | Facility: CLINIC | Age: 74
End: 2019-01-21

## 2019-01-21 DIAGNOSIS — E78.00 HIGH BLOOD CHOLESTEROL: ICD-10-CM

## 2019-01-21 RX ORDER — SIMVASTATIN 20 MG
20 TABLET ORAL AT BEDTIME
Qty: 90 TABLET | Refills: 0 | Status: SHIPPED | OUTPATIENT
Start: 2019-01-21 | End: 2019-02-11

## 2019-01-21 NOTE — TELEPHONE ENCOUNTER
Route to PCP as FYI and for script.     Patient notified and is agreeable to restarting the simvastatin. She still has some 20mg tabs leftover, so will begin taking those, then I instructed that we would send in a new script for her for when that one runs out. Med pended.  Assisted with scheduling with Dr. Mcfarland for February to establish care, as she is due for annual and wanted to be seen in February. Patient voiced how much she likes you, but so difficult to schedule with, as first available physical is May.     Elenita Reyes RN

## 2019-01-21 NOTE — TELEPHONE ENCOUNTER
See recent message (1/3/19).  Is off statin and desired to recheck levels before restarting.  Level are again elevated, demonstrating that statin was beneficial.  I understand that she had a flare of her Crohn's disease and stopped the statin at that time.  Is she comfortable restarting? Or would she like an appointment (phone visit is okay) to discuss further?

## 2019-02-11 ENCOUNTER — OFFICE VISIT (OUTPATIENT)
Dept: FAMILY MEDICINE | Facility: CLINIC | Age: 74
End: 2019-02-11
Payer: COMMERCIAL

## 2019-02-11 VITALS
WEIGHT: 143.6 LBS | HEIGHT: 64 IN | DIASTOLIC BLOOD PRESSURE: 80 MMHG | RESPIRATION RATE: 15 BRPM | OXYGEN SATURATION: 95 % | SYSTOLIC BLOOD PRESSURE: 140 MMHG | BODY MASS INDEX: 24.52 KG/M2 | TEMPERATURE: 98.3 F | HEART RATE: 69 BPM

## 2019-02-11 DIAGNOSIS — E78.00 HIGH BLOOD CHOLESTEROL: ICD-10-CM

## 2019-02-11 DIAGNOSIS — I10 ESSENTIAL HYPERTENSION WITH GOAL BLOOD PRESSURE LESS THAN 140/90: ICD-10-CM

## 2019-02-11 DIAGNOSIS — K50.10 CROHN'S COLITIS, WITHOUT COMPLICATIONS (H): ICD-10-CM

## 2019-02-11 DIAGNOSIS — Z00.00 MEDICARE ANNUAL WELLNESS VISIT, SUBSEQUENT: Primary | ICD-10-CM

## 2019-02-11 DIAGNOSIS — Z12.31 VISIT FOR SCREENING MAMMOGRAM: ICD-10-CM

## 2019-02-11 DIAGNOSIS — R41.3 MEMORY LOSS: ICD-10-CM

## 2019-02-11 PROCEDURE — 99397 PER PM REEVAL EST PAT 65+ YR: CPT | Performed by: FAMILY MEDICINE

## 2019-02-11 RX ORDER — SIMVASTATIN 20 MG
20 TABLET ORAL AT BEDTIME
Qty: 90 TABLET | Refills: 3 | Status: SHIPPED | OUTPATIENT
Start: 2019-02-11 | End: 2019-03-14

## 2019-02-11 RX ORDER — BALSALAZIDE DISODIUM 750 MG/1
2 CAPSULE ORAL DAILY
Refills: 0 | COMMUNITY
Start: 2019-01-17 | End: 2021-04-13

## 2019-02-11 RX ORDER — HYDROCHLOROTHIAZIDE 25 MG/1
25 TABLET ORAL DAILY
Qty: 90 TABLET | Refills: 3 | Status: SHIPPED | OUTPATIENT
Start: 2019-02-11 | End: 2020-10-30

## 2019-02-11 RX ORDER — DIPHENHYDRAMINE HCL 25 MG
25 TABLET ORAL EVERY 6 HOURS PRN
COMMUNITY
End: 2019-02-11

## 2019-02-11 ASSESSMENT — ENCOUNTER SYMPTOMS
WEAKNESS: 0
ARTHRALGIAS: 1
ABDOMINAL PAIN: 0
DIARRHEA: 1
JOINT SWELLING: 0
CONSTIPATION: 0
MYALGIAS: 1
BREAST MASS: 0
HEMATOCHEZIA: 0
NAUSEA: 0
SORE THROAT: 0
CHILLS: 0
HEMATURIA: 0
PARESTHESIAS: 0
SHORTNESS OF BREATH: 0

## 2019-02-11 ASSESSMENT — MIFFLIN-ST. JEOR: SCORE: 1138.43

## 2019-02-11 ASSESSMENT — PAIN SCALES - GENERAL: PAINLEVEL: NO PAIN (0)

## 2019-02-11 ASSESSMENT — ACTIVITIES OF DAILY LIVING (ADL): CURRENT_FUNCTION: NO ASSISTANCE NEEDED

## 2019-02-11 NOTE — PATIENT INSTRUCTIONS

## 2019-02-11 NOTE — PROGRESS NOTES
"SUBJECTIVE:   Julia Tabares is a 73 year old female who presents for Preventive Visit.  Are you in the first 12 months of your Medicare coverage?  No    Annual Wellness Visit     In general, how would you rate your overall health?  Good    Frequency of exercise:  4-5 days/week    Do you usually eat at least 4 servings of fruit and vegetables a day, include whole grains    & fiber and avoid regularly eating high fat or \"junk\" foods?  Yes    Taking medications regularly:  Yes    Medication side effects:  None    Ability to successfully perform activities of daily living:  No assistance needed    Home Safety:  No safety concerns identified    Hearing Impairment:  No hearing concerns    In the past 6 months, have you been bothered by leaking of urine?  No    In general, how would you rate your overall mental or emotional health?  Good    PHQ-2 Total Score: 0    Additional concerns today:  No    Do you feel safe in your environment? YES    Do you have a Health Care Directive? Yes: Advance Directive has been received and scanned.      Fall risk  Fallen 2 or more times in the past year?: No  Any fall with injury in the past year?: No  click delete button to remove this line now    Cognitive Screening   1) Repeat 3 items (Leader, Season, Table)    2) Clock draw: ABNORMAL   3) 3 item recall: Recalls 2 objects   Results: ABNORMAL clock, 1-2 items recalled: PROBABLE COGNITIVE IMPAIRMENT, **INFORM PROVIDER**    Mini-CogTM Copyright BRIELLE Encarnacion. Licensed by the author for use in [facility]; reprinted with permission ([email or web address]). All rights reserved.      Do you have sleep apnea, excessive snoring or daytime drowsiness?: no    Reviewed and updated as needed this visit by clinical staff  Tobacco  Allergies  Meds  Soc Hx        Reviewed and updated as needed this visit by Provider        Social History     Tobacco Use     Smoking status: Former Smoker     Last attempt to quit: 10/13/1992     Years since quitting: " 26.3     Smokeless tobacco: Never Used   Substance Use Topics     Alcohol use: No       Alcohol Use 2019   If you drink alcohol do you typically have greater than 3 drinks per day OR greater than 7 drinks per week? Not Applicable   No flowsheet data found.      Current providers sharing in care for this patient include:   Patient Care Team:  Savannah Ayala MD as PCP - General (Family Practice)  Savannah Ayala MD as PCP - Assigned PCP    The following health maintenance items are reviewed in Epic and correct as of today:  Health Maintenance   Topic Date Due     ZOSTER IMMUNIZATION (1 of 2) 10/28/1996     FALL RISK ASSESSMENT  2018     INFLUENZA VACCINE (1) 2018     MAMMO Q1 YR  2019     ESVIN QUESTIONNAIRE 1 YEAR  2019     PHQ-9 Q1YR  2019     BMP Q1 YR  2019     LIPID MONITORING Q1 YEAR  2020     DTAP/TDAP/TD IMMUNIZATION (4 - Td) 2023     ADVANCE DIRECTIVE PLANNING Q5 YRS  2023     COLON CANCER SCREEN (SYSTEM ASSIGNED)  2027     DEXA SCAN SCREENING (SYSTEM ASSIGNED)  Completed     PNEUMOCOCCAL IMMUNIZATION 65+ LOW/MEDIUM RISK  Completed     HEPATITIS C SCREENING  Completed     IPV IMMUNIZATION  Aged Out     MENINGITIS IMMUNIZATION  Aged Out     Patient Active Problem List   Diagnosis     Crohn's colitis (H)     Anxiety     Postmenopausal atrophic vaginitis     Advanced directives, counseling/discussion     Hyperlipidemia LDL goal <160     Left knee DJD     CTS (carpal tunnel syndrome)     Osteoporosis     Essential hypertension with goal blood pressure less than 140/90     History of gastroesophageal reflux (GERD)     Hiatal hernia     Past Surgical History:   Procedure Laterality Date     C/SECTION, LOW TRANSVERSE  x3    , Low Transverse     HC REDUCTION OF LARGE BREAST         Social History     Tobacco Use     Smoking status: Former Smoker     Last attempt to quit: 10/13/1992     Years since quittin.3      "Smokeless tobacco: Never Used   Substance Use Topics     Alcohol use: No     Family History   Problem Relation Age of Onset     Cancer Mother      Hypertension Mother      Heart Disease Father      Diabetes No family hx of          Pneumonia Vaccine: Already completed   Mammogram Screening: Mammogram Screening: Patient over age 50, mutual decision to screen reflected in health maintenance.  History of abnormal Pap smear: NO - age 65 - see link Cervical Cytology Screening Guidelines    Review of Systems   Constitutional: Negative for chills.   HENT: Negative for congestion and sore throat.    Eyes: Negative for visual disturbance.   Respiratory: Negative for shortness of breath.    Cardiovascular: Negative for chest pain.   Gastrointestinal: Positive for diarrhea. Negative for abdominal pain, constipation, hematochezia and nausea.   Breasts:  Negative for tenderness, breast mass and discharge.   Genitourinary: Negative for hematuria, pelvic pain, urgency, vaginal bleeding and vaginal discharge.   Musculoskeletal: Positive for arthralgias and myalgias. Negative for joint swelling.   Skin: Negative for rash.   Neurological: Negative for weakness and paresthesias.   Psychiatric/Behavioral: Negative for mood changes.     OBJECTIVE:   /80 (BP Location: Right arm, Patient Position: Chair, Cuff Size: Adult Regular)   Pulse 69   Temp 98.3  F (36.8  C) (Oral)   Resp 15   Ht 1.613 m (5' 3.5\")   Wt 65.1 kg (143 lb 9.6 oz)   SpO2 95%   BMI 25.04 kg/m   Estimated body mass index is 25.04 kg/m  as calculated from the following:    Height as of this encounter: 1.613 m (5' 3.5\").    Weight as of this encounter: 65.1 kg (143 lb 9.6 oz).  Physical Exam  GENERAL: healthy, alert and no distress  EYES: Eyes grossly normal to inspection, PERRL and conjunctivae and sclerae normal  HENT: ear canals and TM's normal, nose and mouth without ulcers or lesions  NECK: no adenopathy, no asymmetry, masses, or scars and thyroid normal " "to palpation  RESP: lungs clear to auscultation - no rales, rhonchi or wheezes  CV: regular rate and rhythm, normal S1 S2, no S3 or S4, no murmur, click or rub, no peripheral edema and peripheral pulses strong  ABDOMEN: soft, nontender, no hepatosplenomegaly, no masses and bowel sounds normal  MS: no gross musculoskeletal defects noted, no edema  SKIN: no suspicious lesions or rashes  NEURO: Normal strength and tone, mentation intact and speech normal  PSYCH: mentation appears normal, affect normal/bright    Diagnostic Test Results:  SLUMS exam: 16 (Dementia) see scanned form     ASSESSMENT / PLAN:   1. Medicare annual wellness visit, subsequent    2. Visit for screening mammogram  - MA SCREENING DIGITAL BILAT - Future  (s+30); Future    3. Essential hypertension with goal blood pressure less than 140/90  - Stable, CPM  - hydrochlorothiazide (HYDRODIURIL) 25 MG tablet; Take 1 tablet (25 mg) by mouth daily  Dispense: 90 tablet; Refill: 3    4. High blood cholesterol  - Patient recently restarted Zocor after her cholesterol was high last month   - simvastatin (ZOCOR) 20 MG tablet; Take 1 tablet (20 mg) by mouth At Bedtime  Dispense: 90 tablet; Refill: 3    5. Memory loss  - Scored low on SLUMS testing  - Refer to neuropsych for more thorough memory testing   - NEUROPSYCHOLOGY REFERRAL    6. Crohn's colitis, without complications (H)  - Stable, followed by GI      End of Life Planning:  Patient currently has an advanced directive: Yes.  Practitioner is supportive of decision.    COUNSELING:  Reviewed preventive health counseling, as reflected in patient instructions    BP Readings from Last 1 Encounters:   02/11/19 140/80     Estimated body mass index is 25.04 kg/m  as calculated from the following:    Height as of this encounter: 1.613 m (5' 3.5\").    Weight as of this encounter: 65.1 kg (143 lb 9.6 oz).       reports that she quit smoking about 26 years ago. she has never used smokeless tobacco.      Appropriate " preventive services were discussed with this patient, including applicable screening as appropriate for cardiovascular disease, diabetes, osteopenia/osteoporosis, and glaucoma.  As appropriate for age/gender, discussed screening for colorectal cancer, prostate cancer, breast cancer, and cervical cancer. Checklist reviewing preventive services available has been given to the patient.    Reviewed patients plan of care and provided an AVS. The Intermediate Care Plan ( asthma action plan, low back pain action plan, and migraine action plan) for Julia meets the Care Plan requirement. This Care Plan has been established and reviewed with the Patient.    Counseling Resources:  ATP IV Guidelines  Pooled Cohorts Equation Calculator  Breast Cancer Risk Calculator  FRAX Risk Assessment  ICSI Preventive Guidelines  Dietary Guidelines for Americans, 2010  USDA's MyPlate  ASA Prophylaxis  Lung CA Screening    Yancy Mcfarland DO  [facility]

## 2019-03-12 ENCOUNTER — ANCILLARY PROCEDURE (OUTPATIENT)
Dept: MAMMOGRAPHY | Facility: CLINIC | Age: 74
End: 2019-03-12
Attending: FAMILY MEDICINE
Payer: COMMERCIAL

## 2019-03-12 DIAGNOSIS — Z12.31 VISIT FOR SCREENING MAMMOGRAM: ICD-10-CM

## 2019-03-12 PROCEDURE — 77067 SCR MAMMO BI INCL CAD: CPT | Mod: TC

## 2019-03-14 ENCOUNTER — OFFICE VISIT (OUTPATIENT)
Dept: FAMILY MEDICINE | Facility: CLINIC | Age: 74
End: 2019-03-14
Payer: COMMERCIAL

## 2019-03-14 VITALS
SYSTOLIC BLOOD PRESSURE: 126 MMHG | DIASTOLIC BLOOD PRESSURE: 78 MMHG | WEIGHT: 150 LBS | HEIGHT: 64 IN | TEMPERATURE: 98.5 F | BODY MASS INDEX: 25.61 KG/M2

## 2019-03-14 DIAGNOSIS — E78.00 HIGH BLOOD CHOLESTEROL: ICD-10-CM

## 2019-03-14 DIAGNOSIS — L24.9 IRRITANT CONTACT DERMATITIS, UNSPECIFIED TRIGGER: Primary | ICD-10-CM

## 2019-03-14 PROCEDURE — 99214 OFFICE O/P EST MOD 30 MIN: CPT | Performed by: FAMILY MEDICINE

## 2019-03-14 RX ORDER — SIMVASTATIN 10 MG
10 TABLET ORAL AT BEDTIME
Qty: 90 TABLET | Refills: 3 | Status: SHIPPED | OUTPATIENT
Start: 2019-03-14 | End: 2020-10-30

## 2019-03-14 RX ORDER — BALSALAZIDE DISODIUM 750 MG/1
2250 CAPSULE ORAL 3 TIMES DAILY
COMMUNITY
Start: 2019-03-14 | End: 2019-03-14

## 2019-03-14 RX ORDER — PREDNISONE 20 MG/1
20 TABLET ORAL DAILY
Qty: 7 TABLET | Refills: 0 | Status: SHIPPED | OUTPATIENT
Start: 2019-03-14 | End: 2019-03-21

## 2019-03-14 ASSESSMENT — MIFFLIN-ST. JEOR: SCORE: 1175.4

## 2019-03-14 NOTE — PATIENT INSTRUCTIONS
Patient Education     Atopic Dermatitis (Adult)  Atopic dermatitis is a dry, itchy, red rash. It s also called eczema. The rash is chronic, or ongoing. It can come and go over time. The disease is often passed down in families. It causes a problem with the skin barrier that makes the skin more sensitive to the environment and other factors. The increased skin sensitivity causes an itch, which causes scratching. Scratching can worsen the itching or also break the skin. This can put the skin at risk of infection.  The condition is most common in people with asthma, hay fever, hives, or dry or sensitive skin. The rash may be caused by extreme heat or heavy sweating. Skin irritants can cause the rash to flare up. These can include wool or silk clothing, grease, oils, some medicines, and harsh soaps and detergents. Emotional stress can also be a trigger.  Treatment is done to relieve the itching and inflammation of the skin.  Home care  Follow these tips to care for your condition:    Keep the areas of rash clean by bathing at least every other day. Use lukewarm water to bathe. Don t use hot water, which can dry out the skin.    Don t use soaps with strong detergents. Use mild soaps made for sensitive skin.    Apply a cream or ointment to damp skin right after bathing.    Avoid things that irritate your skin. Wear absorbent, soft fabrics next to the skin rather than rough or scratchy materials.    Use mild laundry soap free of scents and perfumes. Make sure to rinse all the soap out of your clothes.    Treat any skin infection as directed.    Use oral diphenhydramine to help reduce itching. This is an antihistamine you can buy at drug and grocery stores. It can make you sleepy, so use lower doses during the daytime. Or you can use loratadine. This is an antihistamine that will not make you sleepy. Do not use diphenhydramine if you have glaucoma or have trouble urinating due to an enlarged prostate.  Follow-up care  See  your healthcare provider, or as advised. If your symptoms don t get better or if they get worse in the next 7 days, make an appointment with your healthcare provider.  When to seek medical advice  Call your healthcare provider right away  if any of these occur:    Increasing area of redness or pain in the skin    Yellow crusts or wet drainage from the rash    Fever of 100.4 F (38 C) or higher, or as directed by your healthcare provider  Date Last Reviewed: 9/1/2016 2000-2018 [employer]. [address] [place], [place] [id number]. All rights reserved. This information is not intended as a substitute for professional medical care. Always follow your healthcare professional's instructions.

## 2019-03-14 NOTE — PROGRESS NOTES
SUBJECTIVE:   Julia Tabares is a 73 year old female who presents to clinic today for the following health issues:      Rash  Onset: 3 weeks. Eyes a week ago, neck comes/goes    Description:   Location: sides of both eyes, neck  Character: flakey and burning around the eyes, red  Itching (Pruritis): YES    Progression of Symptoms:  worsening    Accompanying Signs & Symptoms:  Fever: no   Body aches or joint pain: no   Sore throat symptoms: no   Recent cold symptoms: no     History:   Previous similar rash: YES, comes/goes    Precipitating factors:   Exposure to similar rash: no   New exposures: new puppy  Recent travel: no     Alleviating factors:  Lotions makes it redder    Therapies Tried and outcome:     Simvastatin question-Patient was previously taking a higher dose of simvastatin and stopped taking it due to muscle spasms.  She states the muscle spasms improved.  Restarted statin at a lower dose recently and is again feeling mild muscle spasms in her legs.  She is wondering if she can stop the statin.       The 10-year ASCVD risk score (Charlottealex ROLLINS Jr., et al., 2013) is: 11.6%    Values used to calculate the score:      Age: 73 years      Sex: Female      Is Non- : No      Diabetic: No      Tobacco smoker: No      Systolic Blood Pressure: 126 mmHg      Is BP treated: No      HDL Cholesterol: 70 mg/dL      Total Cholesterol: 249 mg/dL      Problem list and histories reviewed & adjusted, as indicated.  Additional history: as documented    Patient Active Problem List   Diagnosis     Crohn's colitis (H)     Anxiety     Postmenopausal atrophic vaginitis     Advanced directives, counseling/discussion     High blood cholesterol     Left knee DJD     CTS (carpal tunnel syndrome)     Osteoporosis     Essential hypertension with goal blood pressure less than 140/90     History of gastroesophageal reflux (GERD)     Hiatal hernia     Memory loss     Past Surgical History:   Procedure Laterality Date  "    C/SECTION, LOW TRANSVERSE  x3    , Low Transverse     HC REDUCTION OF LARGE BREAST         Social History     Tobacco Use     Smoking status: Former Smoker     Last attempt to quit: 10/13/1992     Years since quittin.4     Smokeless tobacco: Never Used   Substance Use Topics     Alcohol use: No     Family History   Problem Relation Age of Onset     Cancer Mother      Hypertension Mother      Heart Disease Father      Diabetes No family hx of            Reviewed and updated as needed this visit by clinical staff       Reviewed and updated as needed this visit by Provider         ROS:  Constitutional, HEENT, cardiovascular, pulmonary, gi and gu systems are negative, except as otherwise noted.    OBJECTIVE:     /78 (BP Location: Right arm, Patient Position: Sitting, Cuff Size: Adult Regular)   Temp 98.5  F (36.9  C) (Oral)   Ht 1.626 m (5' 4\")   Wt 68 kg (150 lb)   BMI 25.75 kg/m    Body mass index is 25.75 kg/m .  GENERAL: healthy, alert and no distress  RESP: lungs clear to auscultation - no rales, rhonchi or wheezes  CV: regular rates and rhythm, normal S1 S2, no S3 or S4 and no murmur, click or rub  SKIN: skin over entire anterior neck is erythematous, bilateral upper eyelids are erythematous as well, with overlying dry flaky skin     ASSESSMENT/PLAN:     1. Irritant contact dermatitis, unspecified trigger  - Most likely from her new puppy  - Will treat with oral prednisone given the extent of her eyelid symptoms.  A low dose topical steroid will likely not be effective  - Advised avoiding makeup until the rash is gone   - predniSONE (DELTASONE) 20 MG tablet; Take 20 mg by mouth daily for 7 days.  Dispense: 7 tablet; Refill: 0    2. High blood cholesterol  - Advised continuing statin since her ASCVD risk score is high  - Will decrease dose to 10mg daily to see if that helps with her muscle cramps   - simvastatin (ZOCOR) 10 MG tablet; Take 1 tablet (10 mg) by mouth At Bedtime  Dispense: " 90 tablet; Refill: 3    Follow up next week if the rash persists or worsens.  May need a dermatology referral     Yancy Mcfarland DO  [facility]

## 2019-04-27 DIAGNOSIS — F41.9 ANXIETY: ICD-10-CM

## 2019-04-29 RX ORDER — CITALOPRAM HYDROBROMIDE 20 MG/1
TABLET ORAL
Qty: 90 TABLET | Refills: 0 | Status: SHIPPED | OUTPATIENT
Start: 2019-04-29 | End: 2019-07-26

## 2019-04-29 NOTE — TELEPHONE ENCOUNTER
"Requested Prescriptions   Pending Prescriptions Disp Refills     citalopram (CELEXA) 20 MG tablet [Pharmacy Med Name: CITALOPRAM 20MG TABLETS]  Last Written Prescription Date:  5/3/2018  Last Fill Quantity: 90 tablet,  # refills: 1   Last office visit: 5/3/2018 with prescribing provider:  GENNARO Ayala   Future Office Visit:     90 tablet 0     Sig: TAKE 1 TABLET(20 MG) BY MOUTH DAILY       SSRIs Protocol Passed - 4/27/2019  8:11 PM  PHQ-9 SCORE 5/29/2015 8/28/2017 5/3/2018   PHQ-9 Total Score 4 - -   PHQ-9 Total Score - 3 5     ESVIN-7 SCORE 6/1/2015 8/28/2017 5/3/2018   Total Score 1 - -   Total Score - 4 4             Passed - Recent (12 mo) or future (30 days) visit within the authorizing provider's specialty     Patient had office visit in the last 12 months or has a visit in the next 30 days with authorizing provider or within the authorizing provider's specialty.  See \"Patient Info\" tab in inbasket, or \"Choose Columns\" in Meds & Orders section of the refill encounter.              Passed - Medication is active on med list        Passed - Patient is age 18 or older        Passed - No active pregnancy on record        Passed - No positive pregnancy test in last 12 months          "

## 2019-04-29 NOTE — TELEPHONE ENCOUNTER
Prescription approved per [facility] Refill Protocol.  Magali Barrera,Clinic Rn  [place]

## 2019-07-26 DIAGNOSIS — F41.9 ANXIETY: ICD-10-CM

## 2019-07-26 NOTE — TELEPHONE ENCOUNTER
"Requested Prescriptions   Pending Prescriptions Disp Refills     citalopram (CELEXA) 20 MG tablet [Pharmacy Med Name: CITALOPRAM 20MG TABLETS]  Last Written Prescription Date:  4/29/2019  Last Fill Quantity: 90 tablet,  # refills: 0   Last office visit: 5/3/2018 with prescribing provider:  GENNARO Ayala   Future Office Visit:     90 tablet 0     Sig: TAKE 1 TABLET(20 MG) BY MOUTH DAILY       SSRIs Protocol Passed - 7/26/2019  1:33 PM    PHQ-9 SCORE 5/29/2015 8/28/2017 5/3/2018   PHQ-9 Total Score 4 - -   PHQ-9 Total Score - 3 5     ESVIN-7 SCORE 6/1/2015 8/28/2017 5/3/2018   Total Score 1 - -   Total Score - 4 4             Passed - Recent (12 mo) or future (30 days) visit within the authorizing provider's specialty     Patient had office visit in the last 12 months or has a visit in the next 30 days with authorizing provider or within the authorizing provider's specialty.  See \"Patient Info\" tab in inbasket, or \"Choose Columns\" in Meds & Orders section of the refill encounter.              Passed - Medication is active on med list        Passed - Patient is age 18 or older        Passed - No active pregnancy on record        Passed - No positive pregnancy test in last 12 months          "

## 2019-07-30 RX ORDER — CITALOPRAM HYDROBROMIDE 20 MG/1
TABLET ORAL
Qty: 90 TABLET | Refills: 0 | Status: SHIPPED | OUTPATIENT
Start: 2019-07-30 | End: 2019-11-06

## 2019-11-06 DIAGNOSIS — F41.9 ANXIETY: ICD-10-CM

## 2019-11-06 RX ORDER — CITALOPRAM HYDROBROMIDE 20 MG/1
TABLET ORAL
Qty: 90 TABLET | Refills: 0 | Status: CANCELLED | OUTPATIENT
Start: 2019-11-06

## 2019-11-06 NOTE — TELEPHONE ENCOUNTER
"Requested Prescriptions   Pending Prescriptions Disp Refills     citalopram (CELEXA) 20 MG tablet [Pharmacy Med Name: CITALOPRAM 20MG TABLETS]  Last Written Prescription Date:  7/30/2019  Last Fill Quantity: 90 tabs,  # refills: 0   Last office visit: 3/14/2019 with prescribing provider:  Jamie   Future Office Visit:   90 tablet 0     Sig: TAKE 1 TABLET(20 MG) BY MOUTH DAILY       SSRIs Protocol Passed - 11/6/2019  9:02 AM        Passed - Recent (12 mo) or future (30 days) visit within the authorizing provider's specialty     Patient has had an office visit with the authorizing provider or a provider within the authorizing providers department within the previous 12 mos or has a future within next 30 days. See \"Patient Info\" tab in inbasket, or \"Choose Columns\" in Meds & Orders section of the refill encounter.              Passed - Medication is active on med list        Passed - Patient is age 18 or older        Passed - No active pregnancy on record        Passed - No positive pregnancy test in last 12 months         "

## 2019-11-07 RX ORDER — CITALOPRAM HYDROBROMIDE 20 MG/1
TABLET ORAL
Qty: 90 TABLET | Refills: 0 | Status: SHIPPED | OUTPATIENT
Start: 2019-11-07 | End: 2020-02-24

## 2019-11-07 NOTE — TELEPHONE ENCOUNTER
Last routine visit was 2/11/19.    Med used for anxiety.    Prescription approved per [facility] Refill Protocol.    Renee Shields RN  [facility]

## 2020-02-21 ENCOUNTER — OFFICE VISIT (OUTPATIENT)
Dept: FAMILY MEDICINE | Facility: CLINIC | Age: 75
End: 2020-02-21
Payer: COMMERCIAL

## 2020-02-21 VITALS
HEART RATE: 71 BPM | OXYGEN SATURATION: 97 % | TEMPERATURE: 98.4 F | HEIGHT: 63 IN | DIASTOLIC BLOOD PRESSURE: 74 MMHG | WEIGHT: 142.8 LBS | SYSTOLIC BLOOD PRESSURE: 128 MMHG | BODY MASS INDEX: 25.3 KG/M2

## 2020-02-21 DIAGNOSIS — R53.83 OTHER FATIGUE: ICD-10-CM

## 2020-02-21 DIAGNOSIS — F41.9 ANXIETY: ICD-10-CM

## 2020-02-21 DIAGNOSIS — F41.9 ANXIETY: Primary | ICD-10-CM

## 2020-02-21 DIAGNOSIS — R41.3 MEMORY LOSS: ICD-10-CM

## 2020-02-21 DIAGNOSIS — M81.0 OSTEOPOROSIS, UNSPECIFIED OSTEOPOROSIS TYPE, UNSPECIFIED PATHOLOGICAL FRACTURE PRESENCE: ICD-10-CM

## 2020-02-21 LAB
ALBUMIN SERPL-MCNC: 3.7 G/DL (ref 3.4–5)
ALP SERPL-CCNC: 60 U/L (ref 40–150)
ALT SERPL W P-5'-P-CCNC: 20 U/L (ref 0–50)
ANION GAP SERPL CALCULATED.3IONS-SCNC: 3 MMOL/L (ref 3–14)
AST SERPL W P-5'-P-CCNC: 14 U/L (ref 0–45)
BILIRUB SERPL-MCNC: 0.6 MG/DL (ref 0.2–1.3)
BUN SERPL-MCNC: 16 MG/DL (ref 7–30)
CALCIUM SERPL-MCNC: 9.4 MG/DL (ref 8.5–10.1)
CHLORIDE SERPL-SCNC: 102 MMOL/L (ref 94–109)
CO2 SERPL-SCNC: 31 MMOL/L (ref 20–32)
CREAT SERPL-MCNC: 0.66 MG/DL (ref 0.52–1.04)
GFR SERPL CREATININE-BSD FRML MDRD: 88 ML/MIN/{1.73_M2}
GLUCOSE SERPL-MCNC: 92 MG/DL (ref 70–99)
POTASSIUM SERPL-SCNC: 3.6 MMOL/L (ref 3.4–5.3)
PROT SERPL-MCNC: 7.5 G/DL (ref 6.8–8.8)
SODIUM SERPL-SCNC: 136 MMOL/L (ref 133–144)
TSH SERPL DL<=0.005 MIU/L-ACNC: 1.3 MU/L (ref 0.4–4)
VIT B12 SERPL-MCNC: 1427 PG/ML (ref 193–986)

## 2020-02-21 PROCEDURE — 82607 VITAMIN B-12: CPT | Performed by: NURSE PRACTITIONER

## 2020-02-21 PROCEDURE — 82306 VITAMIN D 25 HYDROXY: CPT | Performed by: NURSE PRACTITIONER

## 2020-02-21 PROCEDURE — 80053 COMPREHEN METABOLIC PANEL: CPT | Performed by: NURSE PRACTITIONER

## 2020-02-21 PROCEDURE — 99214 OFFICE O/P EST MOD 30 MIN: CPT | Performed by: NURSE PRACTITIONER

## 2020-02-21 PROCEDURE — 84443 ASSAY THYROID STIM HORMONE: CPT | Performed by: NURSE PRACTITIONER

## 2020-02-21 PROCEDURE — 36415 COLL VENOUS BLD VENIPUNCTURE: CPT | Performed by: NURSE PRACTITIONER

## 2020-02-21 RX ORDER — CITALOPRAM HYDROBROMIDE 40 MG/1
40 TABLET ORAL DAILY
Qty: 90 TABLET | Refills: 1 | Status: SHIPPED | OUTPATIENT
Start: 2020-02-21 | End: 2020-10-26

## 2020-02-21 ASSESSMENT — ANXIETY QUESTIONNAIRES
GAD7 TOTAL SCORE: 8
5. BEING SO RESTLESS THAT IT IS HARD TO SIT STILL: NOT AT ALL
7. FEELING AFRAID AS IF SOMETHING AWFUL MIGHT HAPPEN: NOT AT ALL
IF YOU CHECKED OFF ANY PROBLEMS ON THIS QUESTIONNAIRE, HOW DIFFICULT HAVE THESE PROBLEMS MADE IT FOR YOU TO DO YOUR WORK, TAKE CARE OF THINGS AT HOME, OR GET ALONG WITH OTHER PEOPLE: SOMEWHAT DIFFICULT
2. NOT BEING ABLE TO STOP OR CONTROL WORRYING: NEARLY EVERY DAY
3. WORRYING TOO MUCH ABOUT DIFFERENT THINGS: SEVERAL DAYS
6. BECOMING EASILY ANNOYED OR IRRITABLE: NOT AT ALL
1. FEELING NERVOUS, ANXIOUS, OR ON EDGE: NEARLY EVERY DAY

## 2020-02-21 ASSESSMENT — MIFFLIN-ST. JEOR: SCORE: 1113.93

## 2020-02-21 ASSESSMENT — PATIENT HEALTH QUESTIONNAIRE - PHQ9
5. POOR APPETITE OR OVEREATING: SEVERAL DAYS
SUM OF ALL RESPONSES TO PHQ QUESTIONS 1-9: 9

## 2020-02-21 NOTE — TELEPHONE ENCOUNTER
"[id number] faxed Refill Authorization re: citalopram (CELEXA) 20 MG tablet    Pharmacy Notes:  Patient has prescriptions from another pharmacy that are not transferable to us because they have  or the refills have run out.       Requested Prescriptions   Pending Prescriptions Disp Refills     citalopram 20 MG PO tablet  Last Written Prescription Date:  2019  Last Fill Quantity: 90 tablet,  # refills: 0   Last office visit: 5/3/2018 with prescribing provider:  GENNARO Ayala   Future Office Visit:   Next 5 appointments (look out 90 days)    Mar 19, 2020 12:40 PM CDT  [facility] [address] [place] [id number] [phone]        90 tablet 0     Sig: Take 1 tablet (20 mg) by mouth daily       SSRIs Protocol Passed - 2020  3:07 PM  PHQ-9 SCORE 2017 5/3/2018 2020   PHQ-9 Total Score - - -   PHQ-9 Total Score 3 5 9     ESVIN-7 SCORE 2017 5/3/2018 2020   Total Score - - -   Total Score 4 4 8               Passed - Recent (12 mo) or future (30 days) visit within the authorizing provider's specialty     Patient has had an office visit with the authorizing provider or a provider within the authorizing providers department within the previous 12 mos or has a future within next 30 days. See \"Patient Info\" tab in inbasket, or \"Choose Columns\" in Meds & Orders section of the refill encounter.              Passed - Medication is active on med list        Passed - Patient is age 18 or older        Passed - No active pregnancy on record        Passed - No positive pregnancy test in last 12 months        "

## 2020-02-21 NOTE — PROGRESS NOTES
Subjective     Julia Tabares is a 74 year old female who presents to clinic today for the following health issues:    HPI     New patient to this provider today.    Also wanting to talk about short term memory issues- daughters are concerned about it.  Forgot this clinic was called [facility].  Daughters have to repeat things and are worried.  In reviewing chart, her last physical 2019 there was memory concerns and patient was referred for neuropsychological testing.  She never followed up with this.    Anxiety Follow-Up    How are you doing with your anxiety since your last visit? Worsened     Are you having other symptoms that might be associated with anxiety? Yes:  GI issues, not wanting to get out of bed, the more anxiety she has the more she    Have you had a significant life event? OTHER: Stress--  had a stroke     Are you feeling depressed? Yes:  wanting to just stay home and not go out or have visitors    Do you have any concerns with your use of alcohol or other drugs? No     recently had a stroke.  Anxiety is worse.  Has been teaching a dance class for years.  Lives with her  in a house.  One of her daughters lives 1 mile away.  1 other daughter and 1 son.    Dr. Rosie [facility] is following for Crohn's colitis.    Social History     Tobacco Use     Smoking status: Former Smoker     Last attempt to quit: 10/13/1992     Years since quittin.3     Smokeless tobacco: Never Used   Substance Use Topics     Alcohol use: No     Drug use: No     ESVIN-7 SCORE 2017 5/3/2018 2020   Total Score - - -   Total Score 4 4 8     PHQ 2017 5/3/2018 2020   PHQ-9 Total Score 3 5 9   Q9: Thoughts of better off dead/self-harm past 2 weeks Not at all Not at all Not at all         How many servings of fruits and vegetables do you eat daily?  4 or more    On average, how many sweetened beverages do you drink each day (Examples: soda, juice, sweet tea, etc.  Do NOT count diet  or artificially sweetened beverages)?   0    How many days per week do you exercise enough to make your heart beat faster? 4    How many minutes a day do you exercise enough to make your heart beat faster? 60 or more    How many days per week do you miss taking your medication? 0    Patient Active Problem List   Diagnosis     Crohn's colitis (H)     Anxiety     Postmenopausal atrophic vaginitis     Advanced directives, counseling/discussion     High blood cholesterol     Left knee DJD     CTS (carpal tunnel syndrome)     Osteoporosis     Essential hypertension with goal blood pressure less than 140/90     History of gastroesophageal reflux (GERD)     Hiatal hernia     Memory loss     Past Surgical History:   Procedure Laterality Date     C/SECTION, LOW TRANSVERSE  x3    , Low Transverse     HC REDUCTION OF LARGE BREAST         Social History     Tobacco Use     Smoking status: Former Smoker     Last attempt to quit: 10/13/1992     Years since quittin.3     Smokeless tobacco: Never Used   Substance Use Topics     Alcohol use: No     Family History   Problem Relation Age of Onset     Cancer Mother      Hypertension Mother      Heart Disease Father      Diabetes No family hx of          Current Outpatient Medications   Medication Sig Dispense Refill     balsalazide (COLAZAL) 750 MG capsule 2 capsules daily   0     Calcium-Vitamin D (CALCIUM + D PO) Take  by mouth. ONCE DAILY.        citalopram (CELEXA) 20 MG tablet TAKE 1 TABLET(20 MG) BY MOUTH DAILY 90 tablet 0            hydrochlorothiazide (HYDRODIURIL) 25 MG tablet Take 1 tablet (25 mg) by mouth daily 90 tablet 3     Multiple Vitamin (MULTIVITAMIN OR) Take  by mouth. ONCE DAILY.        UNABLE TO FIND MEDICATION NAME: Cur Man Vitamin       UNABLE TO FIND MEDICATION NAME: Collagen vitamin       simvastatin (ZOCOR) 10 MG tablet Take 1 tablet (10 mg) by mouth At Bedtime (Patient not taking: Reported on 2020) 90 tablet 3     No Known Allergies  BP  "Readings from Last 3 Encounters:   02/21/20 128/74   03/14/19 126/78   02/11/19 140/80    Wt Readings from Last 3 Encounters:   02/21/20 64.8 kg (142 lb 12.8 oz)   03/14/19 68 kg (150 lb)   02/11/19 65.1 kg (143 lb 9.6 oz)                    Reviewed and updated as needed this visit by Provider  Allergies  Meds  Problems  Med Hx  Surg Hx         Review of Systems   ROS COMP: Constitutional, HEENT, cardiovascular, pulmonary, gi and psychiatric systems are negative, except as otherwise noted.      Objective    /74 (BP Location: Right arm, Patient Position: Sitting, Cuff Size: Adult Regular)   Pulse 71   Temp 98.4  F (36.9  C) (Oral)   Ht 1.588 m (5' 2.5\")   Wt 64.8 kg (142 lb 12.8 oz)   SpO2 97%   BMI 25.70 kg/m    Body mass index is 25.7 kg/m .  Physical Exam   GENERAL: healthy, alert and no distress  PSYCH: mentation appears normal, affect normal/bright, judgement and insight intact and appearance well groomed          Assessment & Plan     1. Anxiety  Uncontrolled.  - Therapeutic listening provided.   -Patient has been on 20 mg of citalopram for many years.  She is willing to increase the dose of this to see if it better controls her anxiety.    Increase to citalopram 40 MG PO tablet; Take 1 tablet (40 mg) by mouth daily  Dispense: 90 tablet; Refill: 1    2. Memory loss  Had previously been referred for neuropsych testing 2/2019 but patient never got this done.  We will check labs today for work-up.  I also recommend that she follows up for neuropsych testing and referral placed.  - Vitamin D Deficiency  - Comprehensive metabolic panel (BMP + Alb, Alk Phos, ALT, AST, Total. Bili, TP)  - TSH with free T4 reflex  - Vitamin B12  - NEUROPSYCHOLOGY REFERRAL    3. Other fatigue  We will check labs for contributing conditions.  - Vitamin D Deficiency  - TSH with free T4 reflex    4. Osteoporosis, unspecified osteoporosis type, unspecified pathological fracture presence    - Vitamin D Deficiency     BMI: " "  Estimated body mass index is 25.7 kg/m  as calculated from the following:    Height as of this encounter: 1.588 m (5' 2.5\").    Weight as of this encounter: 64.8 kg (142 lb 12.8 oz).         Return in about 27 days (around 3/19/2020) for Physical Exam already scheduled.    Belle Avila NP  [facility]    "

## 2020-02-21 NOTE — LETTER
[facility]  [address] [place] [id number] [phone]                                                                                                February 25, 2020    Julia Tabares  [address] [facility] [place] [address]        Dear Julia,     I am pleased to let you know that your vitamin D level is within normal limits.   Your thyroid screening is negative/normal.   Your kidney function, liver function, and electrolytes are within normal limits.   Your vitamin B12 level is higher than normal.  You taking a vitamin B12 supplement?  If so this could be the cause.     If you have any questions or concerns please feel free to contact me at the office at [phone] or via Mobile [employer].     Belle Avila, DNP, APRN, CNP     Results for orders placed or performed in visit on 02/21/20   Vitamin D Deficiency     Status: None   Result Value Ref Range    Vitamin D Deficiency screening 34 20 - 75 ug/L   Comprehensive metabolic panel (BMP + Alb, Alk Phos, ALT, AST, Total. Bili, TP)     Status: None   Result Value Ref Range    Sodium 136 133 - 144 mmol/L    Potassium 3.6 3.4 - 5.3 mmol/L    Chloride 102 94 - 109 mmol/L    Carbon Dioxide 31 20 - 32 mmol/L    Anion Gap 3 3 - 14 mmol/L    Glucose 92 70 - 99 mg/dL    Urea Nitrogen 16 7 - 30 mg/dL    Creatinine 0.66 0.52 - 1.04 mg/dL    GFR Estimate 88 >60 mL/min/[1.73_m2]    GFR Estimate If Black >90 >60 mL/min/[1.73_m2]    Calcium 9.4 8.5 - 10.1 mg/dL    Bilirubin Total 0.6 0.2 - 1.3 mg/dL    Albumin 3.7 3.4 - 5.0 g/dL    Protein Total 7.5 6.8 - 8.8 g/dL    Alkaline Phosphatase 60 40 - 150 U/L    ALT 20 0 - 50 U/L    AST 14 0 - 45 U/L   TSH with free T4 reflex     Status: None   Result Value Ref Range    TSH 1.30 0.40 - 4.00 mU/L   Vitamin B12     Status: Abnormal   Result Value Ref Range    Vitamin B12 1,427 (H) 193 - 986 pg/mL

## 2020-02-21 NOTE — PATIENT INSTRUCTIONS
Call to schedule Neuropsychological testing due the memory concerns    I sent a higher dosage of the Citalopram 40 mg to better control the anxiety    Blood tests today to rule any other metabolic conditions that could contribute to either memory loss or fatigue  [facility]     Discharged by : Radha Armendariz CMA    If you have any questions regarding your visit please contact your care team:     Team [facility]              Clinic Hours Telephone Number     Dr. Dez Avila, CNP   7am-7pm  Monday - Thursday   7am-5pm  Fridays  [phone]   (Appointment scheduling available 24/7)     RN Line  [phone] option 2     Urgent Care - [place] [facility] and [place] - 11am-9pm Monday-Friday Saturday-Sunday- 9am-5pm     [place] -   5pm-9pm Monday-Friday Saturday-Sunday- 9am-5pm    [phone] - [place]    [phone] - [place]     For a Price Quote for your services, please call our Consumer Price Line at [phone].     What options do I have for visits at the clinic other than the traditional office visit?     To expand how we care for you, many of our providers are utilizing electronic visits (e-visits) and telephone visits, when medically appropriate, for interactions with their patients rather than a visit in the clinic. We also offer nurse visits for many medical concerns. Just like any other service, we will bill your insurance company for this type of visit based on time spent on the phone with your provider. Not all insurance companies cover these visits. Please check with your medical insurance if this type of visit is covered. You will be responsible for any charges that are not paid by your insurance.     E-visits via [employer]: generally incur a $45.00 fee.     Telephone visits:  Time spent on the phone: *charged based on time that is spent on the phone in increments of 10 minutes. Estimated cost:   5-10 mins $30.00   11-20  mins. $59.00   21-30 mins. $85.00       Use [employer] (secure email communication and access to your chart) to send your primary care provider a message or make an appointment. Ask someone on your Team how to sign up for [employer].     As always, Thank you for trusting us with your health care needs!      [facility]:    Scheduling Appointments  [facility]  Call: [phone]    [place], [facility]  Call: [phone]    [facility]  Call: [phone]    For Gastroenterology referrals   [facility]   Clinics and Surgery Center, 4th Floor   [address] [place] [address]   Appointments: [phone]    WHERE TO GO FOR CARE?    Clinic    Make an appointment if you:       Are sick (cold, cough, flu, sore throat, earache or in pain).       Have a small injury (sprain, small cut, burn or broken bone).       Need a physical exam, Pap smear, vaccine or prescription refill.       Have questions about your health or medicines.    To reach us:      Call [phone] ([phone]). Open 24 hours every day. (For counseling services, call [phone].)    Log into [employer] at "[email or web address]. (Visit [email or web address] to create an account.) Hospital emergency room    An emergency is a serious or life- threatening problem that must be treated right away.    Call 302 or get to the hospital if you have:      Very bad or sudden:            - Chest pain or pressure         - Bleeding         - Head or belly pain         - Dizziness or trouble seeing, walking or                          Speaking      Problems breathing      Blood in your vomit or you are coughing up blood      A major injury (knocked out, loss of a finger or limb, rape, broken bone protruding from skin)    A mental health crisis. (Or call the Mental Health Crisis line at 1-505.819.4699 or Suicide Prevention Hotline at 1-306.335.8013.)    Open 24  hours every day. You don't need an appointment.     Urgent care    Visit urgent care for sickness or small injuries when the clinic is closed. You don't need an appointment. To check hours or find an urgent care near you, visit [email or web address]. Online care    Get online care from [facility] [place] for more than 70 common problems, like colds, allergies and infections. Open 24 hours every day at:   [email or web address]   Need help deciding?    For advice about where to be seen, you may call your clinic and ask to speak with a nurse. We're here for you 24 hours every day.         If you are deaf or hard of hearing, please let us know. We provide many free services including sign language interpreters, oral interpreters, TTYs, telephone amplifiers, note takers and written materials.

## 2020-02-22 ASSESSMENT — ANXIETY QUESTIONNAIRES: GAD7 TOTAL SCORE: 8

## 2020-02-23 LAB — DEPRECATED CALCIDIOL+CALCIFEROL SERPL-MC: 34 UG/L (ref 20–75)

## 2020-02-24 RX ORDER — CITALOPRAM HYDROBROMIDE 20 MG/1
20 TABLET ORAL DAILY
Qty: 90 TABLET | Refills: 0 | Status: SHIPPED | OUTPATIENT
Start: 2020-02-24 | End: 2020-02-24

## 2020-02-24 RX ORDER — CITALOPRAM HYDROBROMIDE 20 MG/1
20 TABLET ORAL DAILY
Qty: 90 TABLET | Refills: 0 | Status: SHIPPED | OUTPATIENT
Start: 2020-02-24 | End: 2020-10-30

## 2020-04-15 ENCOUNTER — TELEPHONE (OUTPATIENT)
Dept: NEUROPSYCHOLOGY | Facility: CLINIC | Age: 75
End: 2020-04-15

## 2020-04-15 NOTE — TELEPHONE ENCOUNTER
Spoke with patient about changing visit to video or telephone visit for her 4/20 appt. Pt reported not having technology compatible for video visit and would like to pursue telephone option. Pt also requested moving the appt time to 12:30pm.

## 2020-04-16 ENCOUNTER — TELEPHONE (OUTPATIENT)
Dept: NEUROPSYCHOLOGY | Facility: CLINIC | Age: 75
End: 2020-04-16

## 2020-04-16 NOTE — TELEPHONE ENCOUNTER
Spoke with Pt about changing their appt time from 12:30pm to 12:00 pm on 4/20. Pt approved of this change.

## 2020-04-20 ENCOUNTER — VIRTUAL VISIT (OUTPATIENT)
Dept: NEUROPSYCHOLOGY | Facility: CLINIC | Age: 75
End: 2020-04-20
Attending: NURSE PRACTITIONER
Payer: COMMERCIAL

## 2020-04-20 DIAGNOSIS — F09 MENTAL DISORDER DUE TO GENERAL MEDICAL CONDITION: ICD-10-CM

## 2020-04-20 DIAGNOSIS — R41.844 FRONTAL LOBE AND EXECUTIVE FUNCTION DEFICIT: Primary | ICD-10-CM

## 2020-04-20 NOTE — NURSING NOTE
Pt was seen for neuropsychological evaluation at the request of Dr. Belle Avila for the purposes of diagnostic clarification and treatment planning. 123 minutes of telephone test administration and scoring were provided by this writer. Please see Dr. Jasen Johnson's report for a full interpretation of the findings.    Mario Santana  Psychometrist

## 2020-04-24 NOTE — PROGRESS NOTES
Patient:  Julia Tabares MRN:  [id number] :  [birth year] RIOS:  20   Education 12 Handedness:  Left Provider EW Psychometrist:  SH   Orientation    WAIS-IV Raw SS RDS   Personal Info 4   Digit Span 20 8 10   Place 1   Vocabulary 27 8    Time 0         Presidents 2   Similarities 11 5      COWAT - FAS/CFL/PRW   Animal Fluency      Raw 14   Raw 13     SS/MAS 3   SS 6     T 25   T 37     Z    Z    Complex Ideational Material          Raw 6         SS 2         T 11         Oral Trails    TSAT      Trails A 8 Z 3.25 Total time 216 Z [id number]   Trails B 85 Z -8 Total Errors 11 Z -3.48   HVLT           Trial 1 5   T-Score   T-Score   Trial 2 7 Total Recall 20 44 True Positives 9    Trial 3 8 Delayed Recall 5 38 False Positives 0    Learning 3 Percent Retention 63 39 Discrim. Index 9 43   RBANS Story           Total Immediate 15 z Score -0.67 Scaled Score 9     Total Delay 4 z Score -2.27 Scaled Score 5     ILS Health and Safety Questionnaire         Total 34 Classification Moderate       PHQ-9          Total Score 0 Interpretation minimal       ESVIN-7          Total Score 0 Interpretation minimal

## 2020-04-24 NOTE — PROGRESS NOTES
"Julia Tabares is a 74 year old female who is being evaluated via a billable telephone visit.      The patient has been notified of following:     \"This telephone visit will be conducted via a call between you and your physician/provider. We may recommend that you complete the evaluation at the clinic at a later time.    Telephone visits are billed at different rates depending on your insurance coverage. During this emergency period, for some insurers they may be billed the same as an in-person visit.  Please reach out to your insurance provider with any questions.    If during the course of the call the physician/provider feels a telephone visit is not appropriate, you will not be charged for this service.\"    Patient has given verbal consent for Telephone visit? 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 She was accompanied during the interview by her , Jem.     This evaluation was completed in its entirety over the telephone.     BACKGROUND INFORMATION / INTERVIEW FINDINGS    Records indicate that Ms. Tabares  medical history includes anxiety, history of gastroesophageal reflux, hiatal hernia, hypertension, osteoporosis, carpal tunnel syndrome, left knee degenerative joint disease, high blood cholesterol, and Crohn's colitis. Concerns have been expressed about her cognition, and memory in particular. The current evaluation was requested by Belle Avila NP, in this context.    On interview, Ms. Tabares reported that her children have noted concerns to her about her thinking. The patient, however, stated that she feels as if she has had some changes in memory, but that these changes are age-appropriate. Her  " reported that he has not noticed changes in her thinking. When I presented the list of specific cognitive domains to the patient, she denied having identified changes difficulties in any of these areas.    With respect to mental health, Ms. Tabares reported that she did not have any mental health issues throughout the course of her life, and never received diagnoses or treatments for mental health concerns. She noted, however, that her  had a stroke three months ago. She stated that helping him and adjusting to these changes has been stressful. She stated that she had low energy and was more irritable. Her  stated that she was angrier. She recently began taking an antidepressant medication. She reported that this medication has helped. She denied suicidal ideation or past suicide attempts. She denied prior psychiatric hospitalization, hallucinations, or other major mental health concerns.    With regard to other medical background, Ms. Tabares denied prior TBI, stroke, or seizure. She noted that she takes an over-the-counter medication to aid her sleep, and has been doing so for a number of years. She reported that she gets between 8 and 10 hours of sleep per night, and slept normally the night before the exam. She indicated that she has arthritis in her knees and hands, and has suffered from colitis for 14 years. She denied pain at the time of the interview. Per records, her current medications include balsalazide, calcium-vitamin D, citalopram, hydrochlorothiazide, multivitamin, and simvastatin. She denied alcohol, tobacco, or illicit drug use. She denied past problematic substance use. She noted that she smoked cigarettes many years ago. She denied known family neurologic history.    Ms. Tabares lives at home with her . She manages her own basic daily activities, her medications, and their meal preparation. She has recently taken over more of the management of their finances since her   had a stroke. She is driving. By way of background, the patient and her  have been  for 55 years. They have three children (one of whom lives locally), and 10 grandchildren. Regarding educational background, she graduated from high school with poor to average grades. Professionally, she was a full-time  throughout most of her career. She currently works part-time teaching barre classes. She also worked in the past as a  at a clothing store, and as a .    BEHAVIORAL OBSERVATIONS  This evaluation was completed as a telephone based telehealth visit. As such, my ability to make fine-grained determinations about her behaviors is limited. Additionally, because the testing was administered over the patient's phone, many of the tests that would normally be administered were not able to be completed.    Ms. Tabares was polite and cooperative with the exam. Her gait was not observed. Her speech was is notable for mild dysfluency, but was otherwise normal. Her comprehension was mildly impaired, as she required repetition of task instructions on a couple of occasions. Her thought processes were notable for moderate distractibility, forgetting of task instructions, and moderate slowing. Her insight was reduced, as evidenced by her limited appreciation of her cognitive weaknesses. Her mood was neutral. Her effort was rated as adequate. The current results are felt to be a broadly accurate reflection of her cognitive functioning.       RESULTS OF EXAM  Her performances on standardized measures of neuropsychological functioning were as follows. Some caution should be taken when viewing these interpretations, as many of the tests were not normed in a telehealth context.     She misstated the name of the organization where the evaluation was taking place (stated that she was seeing a provider through [employer]), but was otherwise oriented to place. She was oriented to time and various  "aspects of personal information. She was able to state the names of the current president and the most recent past president, but was otherwise unable to name presidents who had served in office since 1980. Vocabulary was average. Auditory attention for digits was average. Learning of words in a list format was average. Delayed recall of list words was low average. Delayed recognition of list words was low average. Learning of short stories was low average. Delayed recall of the stories was impaired. Comprehension of phrases and short stories was impaired. Verbal associative fluency was impaired. Semantic verbal fluency was low average. Verbal abstract reasoning was borderline impaired. Speeded verbal sequencing of numbers was intact. A similar measure with a divided attention component was impaired. Measures of speeded attention and tracking were impaired. She also committed 11 errors on this task, which is an impaired range performance. She scored in the \"moderate\" range on a measure of real-world decision-making and problem-solving, suggesting some problems in this domain.      She endorsed items consistent with minimal symptoms of depression, and minimal symptoms of anxiety on self-report measures.    IMPRESSIONS  As has been noted in this report, this evaluation was completed via telehealth (over the telephone). As such, there are multiple limitations (including the neuropsychological battery that was able to be completed, and in normative comparisons). In the setting of these limitations, the following impressions are offered:     While this was a limited exam, Ms. Tabares demonstrated a pattern of weaknesses and deficits that raises the question of dysfunction of frontal and subcortical brain regions. There may also be milder compromise of temporal lobe regions. While the etiology is not certain, speculatively, given her medical history, one potential explanation for these changes would be cerebrovascular " disease. 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 In the current exam, weaknesses were noted in executive functioning, and some aspects of memory. Behaviorally, she was somewhat distractible. Additionally, she has limited insight into her cognitive changes. The remainder of her cognitive abilities are generally normal and were performed in keeping with her low end of the average range cognitive baseline. She is not reporting elevated symptoms of depression or anxiety.    RECOMMENDATIONS  Preliminary results and recommendations were provided to the patient over the telephone on April 22, 2020, and all questions were answered.     1. If medically indicated, consideration could be given to an MRI of her brain, to aid in diagnostic clarification.    2. A consultation with a neurologist could be considered.    3. Ms. Tabares will to benefit from some degree of support and supervision of her daily activities. Given that her  recently had a stroke, it may be beneficial to have one of their children or other trusted individual checking in on them on a somewhat regular basis. It may be the case that increased support is needed in the future.    4. While I do not think that she necessarily has Alzheimer's disease, placing the patient and her  in contact with a local chapter of the Alzheimer's [employer] can be of benefit, as this organization has helpful resources for patients and their family members. www.alz.org     5. Her memory benefits from recognition cues. Additionally, routine use of a memory notebook or other assistive device could be of benefit.    6. Follow-up neuropsychological evaluation is recommended in 6 to 12 months in order to assess and update recommendations as appropriate. The current results can be used as a baseline at that time.    Jasen Johnson, Ph.D., L.P., ABPP-CN   / Licensed Psychologist [id number]  Department of  Rehabilitation Medicine  Division of Adult Neuropsychology  [facility]    Time spent:  One unit (40 minutes) neurobehavioral status exam including interview and clinical assessment by licensed and board-certified neuropsychologist (CPT 85302). One unit (60 minutes) neuropsychological testing evaluation by licensed and board-certified neuropsychologist, including integration of patient data, interpretation of standardized test results and clinical data, clinical decision-making, treatment planning, and report, first hour (CPT 60854). Two unit(s) (100 minutes) of neuropsychological testing evaluation by licensed and board-certified neuropsychologist, including integration of patient data, interpretation of standardized test results and clinical data, clinical decision-making, treatment planning, and report, subsequent hours (CPT 49527). One unit (30 minutes) of psychological and neuropsychological test administration and scoring by technician, first 30 minutes (CPT 86357). Three units (93 minutes) psychological or neuropsychological test administration and scoring by technician, subsequent 30 minutes (CPT 67119). Diagnoses: R41.844, F06.8.      Phone call duration: 104 minutes, in total.

## 2020-06-23 ENCOUNTER — TELEPHONE (OUTPATIENT)
Dept: ORTHOPEDICS | Facility: CLINIC | Age: 75
End: 2020-06-23

## 2020-06-23 NOTE — TELEPHONE ENCOUNTER
Patient is currently scheduled with Dr. Hall for B knee pain, possible injection, discussion of other treatments on 6/29/20.  Patient is an established patient of Dr. Solorzano and has undergone previous US guided injections with him.  [facility] for patient to call back and discuss.  Patient should be made aware that Dr. Hall does not perform US guided injections, and can r/s with Dr. Solorzano if she would like to continue to have US guided injections.    Lauren Ortega MS ATC

## 2020-10-12 ENCOUNTER — VIRTUAL VISIT (OUTPATIENT)
Dept: FAMILY MEDICINE | Facility: CLINIC | Age: 75
End: 2020-10-12
Payer: COMMERCIAL

## 2020-10-12 DIAGNOSIS — M79.89 SWELLING OF THUMB, RIGHT: Primary | ICD-10-CM

## 2020-10-12 DIAGNOSIS — Z87.19 HISTORY OF COLITIS: ICD-10-CM

## 2020-10-12 PROCEDURE — 99214 OFFICE O/P EST MOD 30 MIN: CPT | Mod: 95 | Performed by: NURSE PRACTITIONER

## 2020-10-12 RX ORDER — PREDNISONE 10 MG/1
TABLET ORAL
Qty: 20 TABLET | Refills: 0 | Status: SHIPPED | OUTPATIENT
Start: 2020-10-12 | End: 2020-10-30

## 2020-10-12 NOTE — PROGRESS NOTES
"Julia Tabares is a 74 year old female who is being evaluated via a billable video visit.      The patient has been notified of following:     \"This video visit will be conducted via a call between you and your physician/provider. We have found that certain health care needs can be provided without the need for an in-person physical exam.  This service lets us provide the care you need with a video conversation.  If a prescription is necessary we can send it directly to your pharmacy.  If lab work is needed we can place an order for that and you can then stop by our lab to have the test done at a later time.    Video visits are billed at different rates depending on your insurance coverage.  Please reach out to your insurance provider with any questions.    If during the course of the call the physician/provider feels a video visit is not appropriate, you will not be charged for this service.\"    Patient has given verbal consent for Video visit? Yes  How would you like to obtain your AVS? MyChart  If you are dropped from the video visit, the video invite should be resent to: Text to cell phone: [phone]  Will anyone else be joining your video visit? No      Subjective     Julia Tabares is a 74 year old female who presents today via video visit for the following health issues:    HPI     Concerns: Swollen right thumb started two days ago. Wearing compression glove seems to help alleviate pain.     She states she has arthritis. Denies history of gout. She denies open sores to this area and no bug bites. Denies injury to this thumb. Denies fevers. She also reports some pain to that wrist due to swelling. Area is slightly red. She is right handed     She has colitis which is intermittent. She can go months without a flare up.     Video Start Time: 1544    Review of Systems   Constitutional, HEENT, cardiovascular, pulmonary, gi and gu systems are negative, except thumb pain/swelling       Objective     "       Vitals:  No vitals were obtained today due to virtual visit.    Physical Exam     GENERAL: Healthy, alert and no distress  EYES: Eyes grossly normal to inspection.  No discharge or erythema, or obvious scleral/conjunctival abnormalities.  RESP: No audible wheeze, cough, or visible cyanosis.  No visible retractions or increased work of breathing.    MS: swelling with slight erythema noted to right 1st metacarpal, slight decrease in ROM noted   SKIN: No significant rash, abnormal lesions.  NEURO: Cranial nerves grossly intact.  Mentation and speech appropriate for age.  PSYCH: Mentation appears normal, affect normal/bright, judgement and insight intact, normal speech and appearance well-groomed.          Assessment & Plan     Swelling of thumb, right  Suspicious for gout versus arthritis. Will treat with prednisone, discussed possible side effects of medication with pt. Handout provided with other great tips. Would recommend avoiding NSAIDs due to history of colitis. If symptoms persist/worsen, she should be evaluated in clinic this week. No acute injury so fracture is less likely. No break in the skin/bite so cellulitis is less likely.   - predniSONE (DELTASONE) 10 MG tablet; Take 3 tabs by mouth daily x 3 days, then 2 tabs daily x 3 days, then 1 tab daily x 3 days, then 1/2 tab daily x 3 days.    History of colitis  Would recommend avoiding NSAIDs due to history of colitis.       Return in about 3 days (around 10/15/2020) for If symptoms worsen or fail to improve.    Therese Del Cid NP  [facility]      Video-Visit Details    Type of service:  Video Visit    Video End Time:1602    Originating Location (pt. Location): Home    Distant Location (provider location):  [facility]     Platform used for Video Visit: Ana

## 2020-10-13 NOTE — PATIENT INSTRUCTIONS
Patient Education     Treating Gout Attacks     Raising the joint above the level of your heart can help reduce gout symptoms.     Gout is a disease that affects the joints. It is caused by excess uric acid in your blood that may lead to crystals forming in your joints. Left untreated, it can lead to painful foot and joint deformities and even kidney problems. But, by treating gout early, you can relieve pain and help prevent future problems. Gout can usually be treated with medicine and proper diet. In severe cases, surgery may be needed.  Gout attacks are painful and often happen more than once. Taking medicines may reduce pain and prevent attacks in the future. There are also some things you can do at home to relieve symptoms.  Medicines for gout  Your healthcare provider may prescribe a daily medicine to reduce levels of uric acid. Reducing your uric acid levels may help prevent gout attacks. Allopurinol is one commonly used medicine taken daily to reduce uric acid levels. Other daily medicines used to reduce uric acid levels include febuxostat, lesinurad, and probencid. Other medicines can help relieve pain and swelling during an acute attack. Medicines such as NSAIDs (nonsteroidal anti-inflammatory medicines), steroids, and colchicine may be prescribed for intermittent use to relieve an acute gout attack. Be sure to take your medicine as directed.  What you can do  Below are some things you can do at home to relieve gout symptoms. Your healthcare provider may have other tips.    Rest the painful joint as much as you can.    Raise the painful joint so it is at a level higher than your heart.    Use ice for 10 minutes every 1 to 2 hours as possible.  How can I prevent gout?  With a little effort, you may be able to prevent gout attacks in the future. Here are some things you can do:    Don't eat foods high in purines  ? Certain meats (red meat, processed meat, turkey)  ? Organ meats (kidney, liver,  sweetbread)  ? Shellfish (lobster, crab, shrimp, scallop, mussel)  ? Certain fish (anchovy, sardine, herring, mackerel)    Take any medicines prescribed by your healthcare provider.    Lose weight if you need to.    Reduce high fructose corn syrup in meals and drinks.    Reduce or cut out alcohol, particularly beer, but also red wine and spirits.    Control blood pressure, diabetes, and cholesterol.    Drink plenty of water to help flush uric acid from your body.  Date Last Reviewed: 4/1/2018 2000-2019 [employer]. [address] [place] [id number]. All rights reserved. This information is not intended as a substitute for professional medical care. Always follow your healthcare professional's instructions.

## 2020-10-23 DIAGNOSIS — F41.9 ANXIETY: ICD-10-CM

## 2020-10-23 NOTE — TELEPHONE ENCOUNTER
Routing refill request to provider for review/approval because:  A break in medication  Dose exceeds protocol

## 2020-10-24 NOTE — TELEPHONE ENCOUNTER
Forwarding to most recent provider.    Of note, I also notice that she has subsequently had neuropsych testing and has not had follow up since that time.  So likely we need to investigate whether or not she ever began this medication, and also schedule follow up to discuss neuropsych results.

## 2020-10-26 RX ORDER — CITALOPRAM HYDROBROMIDE 40 MG/1
40 TABLET ORAL DAILY
Qty: 90 TABLET | Refills: 1 | OUTPATIENT
Start: 2020-10-26

## 2020-10-26 NOTE — TELEPHONE ENCOUNTER
Duplicate refill request.  See 10/23/20 refill encounter as this med was refilled.    Belle Avila, DNP, APRN, CNP

## 2020-10-30 ENCOUNTER — NURSE TRIAGE (OUTPATIENT)
Dept: NURSING | Facility: CLINIC | Age: 75
End: 2020-10-30

## 2020-10-30 ENCOUNTER — OFFICE VISIT (OUTPATIENT)
Dept: FAMILY MEDICINE | Facility: CLINIC | Age: 75
End: 2020-10-30
Payer: COMMERCIAL

## 2020-10-30 VITALS
BODY MASS INDEX: 25.03 KG/M2 | WEIGHT: 136 LBS | DIASTOLIC BLOOD PRESSURE: 82 MMHG | HEIGHT: 62 IN | SYSTOLIC BLOOD PRESSURE: 150 MMHG

## 2020-10-30 DIAGNOSIS — E78.00 HIGH BLOOD CHOLESTEROL: Primary | ICD-10-CM

## 2020-10-30 DIAGNOSIS — R41.3 MEMORY LOSS: ICD-10-CM

## 2020-10-30 DIAGNOSIS — F41.9 ANXIETY: ICD-10-CM

## 2020-10-30 DIAGNOSIS — I10 ESSENTIAL HYPERTENSION WITH GOAL BLOOD PRESSURE LESS THAN 140/90: ICD-10-CM

## 2020-10-30 DIAGNOSIS — Z51.81 ENCOUNTER FOR THERAPEUTIC DRUG MONITORING: ICD-10-CM

## 2020-10-30 DIAGNOSIS — K50.10 CROHN'S COLITIS, WITHOUT COMPLICATIONS (H): ICD-10-CM

## 2020-10-30 PROCEDURE — 93000 ELECTROCARDIOGRAM COMPLETE: CPT | Performed by: NURSE PRACTITIONER

## 2020-10-30 PROCEDURE — 96127 BRIEF EMOTIONAL/BEHAV ASSMT: CPT | Performed by: NURSE PRACTITIONER

## 2020-10-30 PROCEDURE — 99214 OFFICE O/P EST MOD 30 MIN: CPT | Performed by: NURSE PRACTITIONER

## 2020-10-30 RX ORDER — HYDROCHLOROTHIAZIDE 25 MG/1
25 TABLET ORAL DAILY
Qty: 90 TABLET | Refills: 3 | Status: SHIPPED | OUTPATIENT
Start: 2020-10-30 | End: 2024-08-13 | Stop reason: DRUGHIGH

## 2020-10-30 RX ORDER — BALSALAZIDE DISODIUM 750 MG/1
1500 CAPSULE ORAL DAILY
Refills: 0 | Status: CANCELLED | OUTPATIENT
Start: 2020-10-30

## 2020-10-30 RX ORDER — SIMVASTATIN 10 MG
10 TABLET ORAL AT BEDTIME
Qty: 90 TABLET | Refills: 3 | Status: SHIPPED | OUTPATIENT
Start: 2020-10-30 | End: 2021-04-13

## 2020-10-30 ASSESSMENT — PATIENT HEALTH QUESTIONNAIRE - PHQ9
SUM OF ALL RESPONSES TO PHQ QUESTIONS 1-9: 7
5. POOR APPETITE OR OVEREATING: SEVERAL DAYS

## 2020-10-30 ASSESSMENT — ANXIETY QUESTIONNAIRES
7. FEELING AFRAID AS IF SOMETHING AWFUL MIGHT HAPPEN: NOT AT ALL
IF YOU CHECKED OFF ANY PROBLEMS ON THIS QUESTIONNAIRE, HOW DIFFICULT HAVE THESE PROBLEMS MADE IT FOR YOU TO DO YOUR WORK, TAKE CARE OF THINGS AT HOME, OR GET ALONG WITH OTHER PEOPLE: SOMEWHAT DIFFICULT
GAD7 TOTAL SCORE: 4
1. FEELING NERVOUS, ANXIOUS, OR ON EDGE: SEVERAL DAYS
5. BEING SO RESTLESS THAT IT IS HARD TO SIT STILL: NOT AT ALL
3. WORRYING TOO MUCH ABOUT DIFFERENT THINGS: SEVERAL DAYS
2. NOT BEING ABLE TO STOP OR CONTROL WORRYING: SEVERAL DAYS
6. BECOMING EASILY ANNOYED OR IRRITABLE: NOT AT ALL

## 2020-10-30 ASSESSMENT — MIFFLIN-ST. JEOR: SCORE: 1074.11

## 2020-10-30 NOTE — LETTER
[facility]  [address] [facility] [address]  [phone]                                                                                                November 2, 2020    Julia Tabares  [address] [place] [id number]        Dear Ms. Tbaares,    Your recent EKG was Normal.       Sincerely,      Therese Del Cid NP/hl

## 2020-10-30 NOTE — TELEPHONE ENCOUNTER
Patient calling - requesting refill of Balsalazide 750 mg.  Preferred pharmacy: [facility] in [address] [facility].    Ashley Cid, RN  Triage Nurse Advisor    Reason for Disposition    Caller requesting a NON-URGENT new prescription or refill and triager unable to refill per unit policy    Protocols used: MEDICATION QUESTION CALL-A-AH

## 2020-10-30 NOTE — TELEPHONE ENCOUNTER
Routing refill request to provider for review/approval because:  Drug not active on patient's medication list      Leonor Blair RN  Triage Nurse  [facility]  Appointment line: [phone]  [facility], 24 hour nurse line, available by calling clinic at [phone] and following prompts.

## 2020-10-30 NOTE — PROGRESS NOTES
"Subjective     Julia Tabares is a 75 year old female who presents to clinic today for the following health issues:    HPI            Non fasting today.     She reports having had Bilateral Knee injections about 1 year ago. 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 She states that the swelling and redness has gone down significantly to her right thumb since her last virtual visit. She took the prednisone for about 1 week but didn't like how she was feeling on it so she stopped taking it, she denies symptoms/side effects from doing so. She reports having arthritis to her hands. She has applied a cream to her hands/thumbs which helps.      She states she is thinking about moving to [place] as she has a daughter that lives there. She adds that her spouse had a stroke in the past year.     She is requesting a refill for balasalizide as it helps greatly with her diarrhea/colitis. She has been to GI in the past who has been prescribing it.     She states that she has taken an OTC \"healthy one\" pill for mood swings the past 2 days instead of celexa, she hasn't noticed any difference yet. Her last EKG was in 2017.     She has been out of her BP med for a few weeks now.     She had neuropsych virtual visit in April due to memory concerns, documentation states she could consider MRI and/or neurology referral. She doesn't remember having this visit     Hyperlipidemia Follow-Up      Are you regularly taking any medication or supplement to lower your cholesterol?   Yes- Simvastatin 10mg    Are you having muscle aches or other side effects that you think could be caused by your cholesterol lowering medication?  No    Hypertension Follow-up      Do you check your blood pressure regularly outside of the clinic? No  has cuff machine     Are you following a low salt diet? Yes    Are your blood pressures ever more than 140 on the top number (systolic) OR more   than 90 on the bottom number (diastolic), for example 140/90?     Anxiety " "Follow-Up    How are you doing with your anxiety since your last visit?      Are you having other symptoms that might be associated with anxiety? No not any different than what it was     Have you had a significant life event?  had a stroke recently.     Are you feeling depressed? Just a little    Do you have any concerns with your use of alcohol or other drugs? No    Social History     Tobacco Use     Smoking status: Former Smoker     Quit date: 10/13/1992     Years since quittin.0     Smokeless tobacco: Never Used   Substance Use Topics     Alcohol use: No     Drug use: No     ESVIN-7 SCORE 5/3/2018 2020 10/30/2020   Total Score - - -   Total Score 4 8 4     PHQ 5/3/2018 2020 10/30/2020   PHQ-9 Total Score 5 9 7   Q9: Thoughts of better off dead/self-harm past 2 weeks Not at all Not at all Not at all         How many servings of fruits and vegetables do you eat daily?  4 or more    On average, how many sweetened beverages do you drink each day (Examples: soda, juice, sweet tea, etc.  Do NOT count diet or artificially sweetened beverages)?   0    How many days per week do you miss taking your medication? 0      Review of Systems   Constitutional, HEENT, cardiovascular, pulmonary, gi and gu systems are negative, except anxiety, thumb pain      Objective    BP (!) 150/82 (BP Location: Right arm, Patient Position: Sitting, Cuff Size: Adult Regular)   Ht 1.581 m (5' 2.25\")   Wt 61.7 kg (136 lb)   BMI 24.68 kg/m    Body mass index is 24.68 kg/m .  Physical Exam   GENERAL: healthy, alert and no distress  EYES: Eyes grossly normal to inspection  RESP: lungs clear to auscultation - no rales, rhonchi or wheezes  CV: regular rate and rhythm, normal S1 S2, no S3 or S4, no murmur, click or rub, no peripheral edema and peripheral pulses strong  ABDOMEN: soft, nontender, no hepatosplenomegaly, no masses and bowel sounds normal  MS: mild swelling noted to medial aspects of bilat knees  SKIN: no suspicious " lesions or rashes  NEURO: forgetful, she repeats needing a refill a few times during appointment, speech normal  PSYCH: mentation appears normal, affect normal/bright    Assessment & Plan     High blood cholesterol  Encouraged pt to schedule a fasting lab only visit in the next few weeks, medication refilled.   - simvastatin (ZOCOR) 10 MG tablet; Take 1 tablet (10 mg) by mouth At Bedtime  - Lipid panel reflex to direct LDL Fasting; Future    Essential hypertension with goal blood pressure less than 140/90  Encouraged pt to re-start medication, medication refilled. EKG ordered today due to hypertension and for monitoring for prolonged QT with being on celexa. EKG: NSR  - hydrochlorothiazide (HYDRODIURIL) 25 MG tablet; Take 1 tablet (25 mg) by mouth daily  - Basic metabolic panel  (Ca, Cl, CO2, Creat, Gluc, K, Na, BUN); Future  - EKG 12-lead complete w/read - Clinics    Encounter for therapeutic drug monitoring  EKG ordered today due to hypertension and for monitoring for prolonged QT with being on celexa   - EKG 12-lead complete w/read - Clinics    Anxiety  Stable. EKG ordered today due to hypertension and for monitoring for prolonged QT with being on celexa     Memory loss  - NEUROLOGY ADULT REFERRAL    Crohn's colitis, without complications (H)  Stable with current medication. Encouraged pt to contact her GI provider for refill of balasalizde, she voiced understanding             Return in about 1 week (around 11/6/2020) for Lab Work.    Therese Del Cid NP  [facility]

## 2020-10-30 NOTE — PATIENT INSTRUCTIONS
Call Dr. Leavitt [phone] for refills of Balsalazide.     Schedule fasting lab only visit in the next few weeks.     Re-start blood pressure medication.

## 2020-10-31 ASSESSMENT — ANXIETY QUESTIONNAIRES: GAD7 TOTAL SCORE: 4

## 2020-11-03 ENCOUNTER — TRANSFERRED RECORDS (OUTPATIENT)
Dept: HEALTH INFORMATION MANAGEMENT | Facility: CLINIC | Age: 75
End: 2020-11-03

## 2020-11-03 LAB — CREAT SERPL-MCNC: 0.78 MG/DL (ref 0.6–1.02)

## 2020-12-22 ENCOUNTER — OFFICE VISIT (OUTPATIENT)
Dept: FAMILY MEDICINE | Facility: CLINIC | Age: 75
End: 2020-12-22
Payer: COMMERCIAL

## 2020-12-22 VITALS
HEART RATE: 78 BPM | SYSTOLIC BLOOD PRESSURE: 168 MMHG | HEIGHT: 62 IN | OXYGEN SATURATION: 98 % | DIASTOLIC BLOOD PRESSURE: 90 MMHG | BODY MASS INDEX: 24.48 KG/M2 | TEMPERATURE: 99.2 F | WEIGHT: 133 LBS

## 2020-12-22 DIAGNOSIS — R41.3 MEMORY LOSS: ICD-10-CM

## 2020-12-22 DIAGNOSIS — E78.00 HIGH BLOOD CHOLESTEROL: ICD-10-CM

## 2020-12-22 DIAGNOSIS — K50.10 CROHN'S COLITIS, WITHOUT COMPLICATIONS (H): ICD-10-CM

## 2020-12-22 DIAGNOSIS — Z00.00 ENCOUNTER FOR MEDICARE ANNUAL WELLNESS EXAM: Primary | ICD-10-CM

## 2020-12-22 DIAGNOSIS — I10 ESSENTIAL HYPERTENSION WITH GOAL BLOOD PRESSURE LESS THAN 140/90: ICD-10-CM

## 2020-12-22 LAB
ERYTHROCYTE [DISTWIDTH] IN BLOOD BY AUTOMATED COUNT: 13.9 % (ref 10–15)
HCT VFR BLD AUTO: 42.2 % (ref 35–47)
HGB BLD-MCNC: 13.6 G/DL (ref 11.7–15.7)
MCH RBC QN AUTO: 29.8 PG (ref 26.5–33)
MCHC RBC AUTO-ENTMCNC: 32.2 G/DL (ref 31.5–36.5)
MCV RBC AUTO: 93 FL (ref 78–100)
PLATELET # BLD AUTO: 342 10E9/L (ref 150–450)
RBC # BLD AUTO: 4.56 10E12/L (ref 3.8–5.2)
WBC # BLD AUTO: 7.1 10E9/L (ref 4–11)

## 2020-12-22 PROCEDURE — 85027 COMPLETE CBC AUTOMATED: CPT | Performed by: FAMILY MEDICINE

## 2020-12-22 PROCEDURE — 36415 COLL VENOUS BLD VENIPUNCTURE: CPT | Performed by: FAMILY MEDICINE

## 2020-12-22 PROCEDURE — 80053 COMPREHEN METABOLIC PANEL: CPT | Performed by: FAMILY MEDICINE

## 2020-12-22 PROCEDURE — 80061 LIPID PANEL: CPT | Performed by: FAMILY MEDICINE

## 2020-12-22 PROCEDURE — 99213 OFFICE O/P EST LOW 20 MIN: CPT | Mod: 25 | Performed by: FAMILY MEDICINE

## 2020-12-22 PROCEDURE — 99397 PER PM REEVAL EST PAT 65+ YR: CPT | Performed by: FAMILY MEDICINE

## 2020-12-22 ASSESSMENT — ENCOUNTER SYMPTOMS
HEMATOCHEZIA: 0
CHILLS: 0
PALPITATIONS: 0
BREAST MASS: 0
HEADACHES: 0
FREQUENCY: 0
ABDOMINAL PAIN: 0
CONSTIPATION: 0
JOINT SWELLING: 1
SORE THROAT: 0
ARTHRALGIAS: 1
ENDOCRINE NEGATIVE: 1
DYSURIA: 0
SHORTNESS OF BREATH: 0
DIARRHEA: 0
WEAKNESS: 0
COUGH: 0
ALLERGIC/IMMUNOLOGIC NEGATIVE: 1
NERVOUS/ANXIOUS: 1
HEMATURIA: 0
FEVER: 0
NAUSEA: 0
PARESTHESIAS: 0
MYALGIAS: 0
HEARTBURN: 0
HEMATOLOGIC/LYMPHATIC NEGATIVE: 1
DIZZINESS: 0
EYE PAIN: 0

## 2020-12-22 ASSESSMENT — ACTIVITIES OF DAILY LIVING (ADL)
CURRENT_FUNCTION: MONEY MANAGEMENT REQUIRES ASSISTANCE
CURRENT_FUNCTION: PREPARING MEALS REQUIRES ASSISTANCE

## 2020-12-22 ASSESSMENT — PATIENT HEALTH QUESTIONNAIRE - PHQ9
SUM OF ALL RESPONSES TO PHQ QUESTIONS 1-9: 11
SUM OF ALL RESPONSES TO PHQ QUESTIONS 1-9: 11
10. IF YOU CHECKED OFF ANY PROBLEMS, HOW DIFFICULT HAVE THESE PROBLEMS MADE IT FOR YOU TO DO YOUR WORK, TAKE CARE OF THINGS AT HOME, OR GET ALONG WITH OTHER PEOPLE: VERY DIFFICULT

## 2020-12-22 ASSESSMENT — MIFFLIN-ST. JEOR: SCORE: 1059.78

## 2020-12-22 ASSESSMENT — PAIN SCALES - GENERAL: PAINLEVEL: NO PAIN (0)

## 2020-12-22 NOTE — PATIENT INSTRUCTIONS
Patient Education   Personalized Prevention Plan  You are due for the preventive services outlined below.  Your care team is available to assist you in scheduling these services.  If you have already completed any of these items, please share that information with your care team to update in your medical record.  Health Maintenance Due   Topic Date Due     Zoster (Shingles) Vaccine (1 of 2) 10/28/1996                    Mammogram  03/12/2020     Flu Vaccine (1) 09/01/2020      Dr. Leavitt - gastroenterologist  [phone]    To schedule the MRI  [phone]    And neurology will call you to schedule.

## 2020-12-22 NOTE — PROGRESS NOTES
"SUBJECTIVE:   Julia Tabares is a 75 year old female who presents for Preventive Visit.      Patient has been advised of split billing requirements and indicates understanding: Yes   Are you in the first 12 months of your Medicare coverage?  No    Healthy Habits:     In general, how would you rate your overall health?  Good    Frequency of exercise:  2-3 days/week    Duration of exercise:  45-60 minutes    Do you usually eat at least 4 servings of fruit and vegetables a day, include whole grains    & fiber and avoid regularly eating high fat or \"junk\" foods?  No    Taking medications regularly:  Yes    Medication side effects:  None    Ability to successfully perform activities of daily living:  Preparing meals requires assistance and money management requires assistance    Home Safety:  No safety concerns identified    Hearing Impairment:  No hearing concerns    In the past 6 months, have you been bothered by leaking of urine?  No    In general, how would you rate your overall mental or emotional health?  Poor      PHQ-2 Total Score: 4    Additional concerns today:  Yes    Is accopanied by daughter Buffy Feliz [phone] cell  Daughter has recently gotten more involved in patient's medical care.    Dr. Dagmar NAZARIO  Is taking balsalazide prn  Does not recall when she last saw Dr. Leavitt.  Also has Rx for sulfasalazine, prescribed on the same day by a different physician.  But has not been taking this.     had a stroke about a year ago.  Daughter got involved at that time.    Has been seeing a naturopath  And placed on vitamins and supplements for past month and a half.  Daughter will send in a list of all the supplements.    Has not been taking citalopram  Has been taking an herb - she feels it has been effective.    Has not been taking hydrochlorothiazide    Has not been taking simvastatin  Lipids were high in 2019 when she was off simvastatin so I had asked her to restart at that time.  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    Notices memory changes.  Daughters have noticed as well.  Reviewed neuropsych results    Does take medication to help her sleep.  OTC - doesn't recall the name.  Also takes melatonin.  Is able to get at least 8 hours of sleep.    Her joint was swollen at the base of her thumbs.  But that has since resolved.  Had lasted for about three days.  No other joint swelling noted.    Do you feel safe in your environment? Yes    Have you ever done Advance Care Planning? (For example, a Health Directive, POLST, or a discussion with a medical provider or your loved ones about your wishes): No, advance care planning information given to patient to review.  Patient plans to discuss their wishes with loved ones or provider.        Fall risk  Fallen 2 or more times in the past year?: No  Any fall with injury in the past year?: No    Cognitive Screening   1) Repeat 3 items (Leader, Season, Table)    2) Clock draw: ABNORMAL   3) 3 item recall: Recalls 1 object   Results: ABNORMAL clock, 1-2 items recalled: PROBABLE COGNITIVE IMPAIRMENT, **INFORM PROVIDER**    Mini-CogTM Copyright S Alysha. Licensed by the author for use in [facility]; reprinted with permission (marcia@.[facility]). All rights reserved.      Do you have sleep apnea, excessive snoring or daytime drowsiness?: no    Reviewed and updated as needed this visit by clinical staff  Tobacco  Allergies  Meds   Med Hx  Surg Hx  Fam Hx  Soc Hx        Reviewed and updated as needed this visit by Provider                Social History     Tobacco Use     Smoking status: Former Smoker     Quit date: 10/13/1992     Years since quittin.2     Smokeless tobacco: Never Used   Substance Use Topics     Alcohol use: No     If you drink alcohol do you typically have >3 drinks per day or >7 drinks per week? No    Alcohol Use 2020   Prescreen: >3 drinks/day or >7 drinks/week? Not  Applicable   Prescreen: >3 drinks/day or >7 drinks/week? -   No flowsheet data found.        Current providers sharing in care for this patient include:   Patient Care Team:  Savannah Ayala MD as PCP - General (Family Practice)  Therese Del Cid NP as Assigned PCP    The following health maintenance items are reviewed in Epic and correct as of today:  Health Maintenance   Topic Date Due     ZOSTER IMMUNIZATION (1 of 2) 10/28/1996     LIPID  01/18/2020     MEDICARE ANNUAL WELLNESS VISIT  02/11/2020     FALL RISK ASSESSMENT  02/11/2020     MAMMO SCREENING  03/12/2020     INFLUENZA VACCINE (1) 09/01/2020     BMP  02/21/2021     DTAP/TDAP/TD IMMUNIZATION (4 - Td) 04/05/2023     ADVANCE CARE PLANNING  05/03/2023     COLORECTAL CANCER SCREENING  07/21/2027     DEXA  06/04/2030     HEPATITIS C SCREENING  Completed     PHQ-2  Completed     Pneumococcal Vaccine: 65+ Years  Completed     Pneumococcal Vaccine: Pediatrics (0 to 5 Years) and At-Risk Patients (6 to 64 Years)  Aged Out     IPV IMMUNIZATION  Aged Out     MENINGITIS IMMUNIZATION  Aged Out     HEPATITIS B IMMUNIZATION  Aged Out     Lab work is in process      Review of Systems   Constitutional: Negative for chills and fever.   HENT: Negative for congestion, ear pain, hearing loss and sore throat.    Eyes: Negative for pain and visual disturbance.   Respiratory: Negative for cough and shortness of breath.    Cardiovascular: Negative for chest pain, palpitations and peripheral edema.   Gastrointestinal: Negative for abdominal pain, constipation, diarrhea, heartburn, hematochezia and nausea.   Endocrine: Negative.    Breasts:  Negative for tenderness, breast mass and discharge.   Genitourinary: Negative for dysuria, frequency, genital sores, hematuria, pelvic pain, urgency, vaginal bleeding and vaginal discharge.   Musculoskeletal: Positive for arthralgias and joint swelling. Negative for myalgias.   Skin: Negative for rash.   Allergic/Immunologic:  "Negative.    Neurological: Negative for dizziness, weakness, headaches and paresthesias.   Hematological: Negative.    Psychiatric/Behavioral: Positive for mood changes. The patient is nervous/anxious.          OBJECTIVE:   BP (!) 168/90 (BP Location: Right arm, Patient Position: Chair, Cuff Size: Adult Regular)   Pulse 78   Temp 99.2  F (37.3  C) (Oral)   Ht 1.58 m (5' 2.21\")   Wt 60.3 kg (133 lb)   SpO2 98%   Breastfeeding No   BMI 24.17 kg/m   Estimated body mass index is 24.17 kg/m  as calculated from the following:    Height as of this encounter: 1.58 m (5' 2.21\").    Weight as of this encounter: 60.3 kg (133 lb).  Physical Exam  GENERAL: healthy, alert and no distress  EYES: Eyes grossly normal to inspection, PERRL and conjunctivae and sclerae normal  RESP: lungs clear to auscultation - no rales, rhonchi or wheezes  CV: regular rate and rhythm, normal S1 S2, no S3 or S4, no murmur, click or rub, no peripheral edema and peripheral pulses strong  MS: no gross musculoskeletal defects noted, no edema  SKIN: no suspicious lesions or rashes  PSYCH: mentation appears normal, affect normal/bright    Diagnostic Test Results:  Labs reviewed in Epic    ASSESSMENT / PLAN:   (Z00.00) Encounter for Medicare annual wellness exam  (primary encounter diagnosis)  Comment:   Plan: provided health care directive form.   Signed consent to communicate with daughter.  Encouraged daughter to obtain POA and healthcare POA    (I10) Essential hypertension with goal blood pressure less than 140/90  Comment: not well controlled as she has not been taking HCTZ  Plan: Comprehensive metabolic panel, CBC with         platelets        Restart hydrochlorothiazide and follow up in 4 weeks    (E78.00) High blood cholesterol  Comment: has not been taking statin  Plan: Lipid panel reflex to direct LDL Non-fasting        Discussed protection to blood vessels.  And encouraged restart    (K50.10) Crohn's colitis, without complications " "(H)  Comment: symptomatic controlled  Plan: encouraged annual follow up with GI and review of medications as prescribed by GI at that time.    (R41.3) Memory loss  Comment: noted by myself, patient and daughter.   Did have neuropsych eval early in pandemic - so was done virtually  Plan: NEUROLOGY ADULT REFERRAL, MR Brain w/o & w         Contrast        Will obtain MRI, and update additional labs.  And will refer to neurology for diagnosis.      Patient has been advised of split billing requirements and indicates understanding: Yes  COUNSELING:  Reviewed preventive health counseling, as reflected in patient instructions    Estimated body mass index is 24.17 kg/m  as calculated from the following:    Height as of this encounter: 1.58 m (5' 2.21\").    Weight as of this encounter: 60.3 kg (133 lb).        She reports that she quit smoking about 28 years ago. She has never used smokeless tobacco.      Appropriate preventive services were discussed with this patient, including applicable screening as appropriate for cardiovascular disease, diabetes, osteopenia/osteoporosis, and glaucoma.  As appropriate for age/gender, discussed screening for colorectal cancer, prostate cancer, breast cancer, and cervical cancer. Checklist reviewing preventive services available has been given to the patient.    Reviewed patients plan of care and provided an AVS. The Intermediate Care Plan ( asthma action plan, low back pain action plan, and migraine action plan) for Julia meets the Care Plan requirement. This Care Plan has been established and reviewed with the Patient and daughter.    Counseling Resources:  ATP IV Guidelines  Pooled Cohorts Equation Calculator  Breast Cancer Risk Calculator  Breast Cancer: Medication to Reduce Risk  FRAX Risk Assessment  ICSI Preventive Guidelines  Dietary Guidelines for Americans, 2010  USDA's MyPlate  ASA Prophylaxis  Lung CA Screening    Savannah Irving MD  [facility]" [place]    Identified Health Risks:  Answers for HPI/ROS submitted by the patient on 12/22/2020   Annual Exam:  If you checked off any problems, how difficult have these problems made it for you to do your work, take care of things at home, or get along with other people?: Very difficult  PHQ9 TOTAL SCORE: 11

## 2020-12-23 ASSESSMENT — PATIENT HEALTH QUESTIONNAIRE - PHQ9: SUM OF ALL RESPONSES TO PHQ QUESTIONS 1-9: 11

## 2020-12-24 LAB
ALBUMIN SERPL-MCNC: 3.8 G/DL (ref 3.4–5)
ALP SERPL-CCNC: 70 U/L (ref 40–150)
ALT SERPL W P-5'-P-CCNC: 29 U/L (ref 0–50)
ANION GAP SERPL CALCULATED.3IONS-SCNC: 6 MMOL/L (ref 3–14)
AST SERPL W P-5'-P-CCNC: 25 U/L (ref 0–45)
BILIRUB SERPL-MCNC: 0.6 MG/DL (ref 0.2–1.3)
BUN SERPL-MCNC: 12 MG/DL (ref 7–30)
CALCIUM SERPL-MCNC: 9.1 MG/DL (ref 8.5–10.1)
CHLORIDE SERPL-SCNC: 105 MMOL/L (ref 94–109)
CHOLEST SERPL-MCNC: 248 MG/DL
CO2 SERPL-SCNC: 28 MMOL/L (ref 20–32)
CREAT SERPL-MCNC: 0.7 MG/DL (ref 0.52–1.04)
GFR SERPL CREATININE-BSD FRML MDRD: 85 ML/MIN/{1.73_M2}
GLUCOSE SERPL-MCNC: 87 MG/DL (ref 70–99)
HDLC SERPL-MCNC: 64 MG/DL
LDLC SERPL CALC-MCNC: 167 MG/DL
NONHDLC SERPL-MCNC: 184 MG/DL
POTASSIUM SERPL-SCNC: 3.5 MMOL/L (ref 3.4–5.3)
PROT SERPL-MCNC: 7.7 G/DL (ref 6.8–8.8)
SODIUM SERPL-SCNC: 139 MMOL/L (ref 133–144)
TRIGL SERPL-MCNC: 86 MG/DL

## 2020-12-28 ENCOUNTER — HOSPITAL ENCOUNTER (OUTPATIENT)
Dept: GENERAL RADIOLOGY | Facility: CLINIC | Age: 75
End: 2020-12-28
Attending: FAMILY MEDICINE
Payer: COMMERCIAL

## 2020-12-28 ENCOUNTER — HOSPITAL ENCOUNTER (OUTPATIENT)
Dept: MRI IMAGING | Facility: CLINIC | Age: 75
End: 2020-12-28
Attending: FAMILY MEDICINE
Payer: COMMERCIAL

## 2020-12-28 DIAGNOSIS — Z01.89 ENCOUNTER FOR IMAGING TO SCREEN FOR METAL PRIOR TO MAGNETIC RESONANCE IMAGING (MRI): ICD-10-CM

## 2020-12-28 DIAGNOSIS — R41.3 MEMORY LOSS: ICD-10-CM

## 2020-12-28 PROCEDURE — 255N000002 HC RX 255 OP 636: Performed by: FAMILY MEDICINE

## 2020-12-28 PROCEDURE — A9585 GADOBUTROL INJECTION: HCPCS | Performed by: FAMILY MEDICINE

## 2020-12-28 PROCEDURE — 70250 X-RAY EXAM OF SKULL: CPT

## 2020-12-28 PROCEDURE — 70553 MRI BRAIN STEM W/O & W/DYE: CPT

## 2020-12-28 RX ORDER — GADOBUTROL 604.72 MG/ML
6 INJECTION INTRAVENOUS ONCE
Status: COMPLETED | OUTPATIENT
Start: 2020-12-28 | End: 2020-12-28

## 2020-12-28 RX ADMIN — GADOBUTROL 6 ML: 604.72 INJECTION INTRAVENOUS at 14:34

## 2021-01-05 ENCOUNTER — DOCUMENTATION ONLY (OUTPATIENT)
Dept: CARE COORDINATION | Facility: CLINIC | Age: 76
End: 2021-01-05

## 2021-01-13 ENCOUNTER — PRE VISIT (OUTPATIENT)
Dept: NEUROLOGY | Facility: CLINIC | Age: 76
End: 2021-01-13

## 2021-01-13 NOTE — TELEPHONE ENCOUNTER
FUTURE VISIT INFORMATION      FUTURE VISIT INFORMATION:    Date: 1/14/2021    Time: 11am    [place] [facility] [place]:    Referring provider:  Dr. Ayala    Referring providers clinic:  [facility]     Reason for visit/diagnosis  Memory Loss     RECORDS REQUESTED FROM:       Clinic name Comments Records Status Imaging Status   Internal Dr. Ayala-12/22/2020    MR Brain-12/28/2020 Epic PACS

## 2021-01-14 ENCOUNTER — VIRTUAL VISIT (OUTPATIENT)
Dept: NEUROLOGY | Facility: CLINIC | Age: 76
End: 2021-01-14
Attending: FAMILY MEDICINE
Payer: COMMERCIAL

## 2021-01-14 DIAGNOSIS — G31.84 AMNESTIC MCI (MILD COGNITIVE IMPAIRMENT WITH MEMORY LOSS): Primary | ICD-10-CM

## 2021-01-14 PROCEDURE — 99205 OFFICE O/P NEW HI 60 MIN: CPT | Mod: 95 | Performed by: PSYCHIATRY & NEUROLOGY

## 2021-01-14 PROCEDURE — 99417 PROLNG OP E/M EACH 15 MIN: CPT | Performed by: PSYCHIATRY & NEUROLOGY

## 2021-01-14 RX ORDER — DONEPEZIL HYDROCHLORIDE 5 MG/1
TABLET, FILM COATED ORAL
Qty: 271 TABLET | Refills: 0 | Status: SHIPPED | OUTPATIENT
Start: 2021-01-14 | End: 2021-04-13

## 2021-01-14 NOTE — LETTER
1/14/2021       RE: Julia Tabares  [address] [facility] [id number]     Dear Colleague,    Thank you for referring your patient, Julia Tabares, to the [facility] NEUROLOGY [facility] [place] at [facility]. Please see a copy of my visit note below.    [facility] Neurology Consultation    Julia Tabares MRN# [id number]   Age: 75 year old YOB: 1945     Requesting physician: Savannah High     Reason for Consultation: memory concerns      History of Presenting Symptoms:   Julia Tabares is a 75 year old female who presents today for evaluation of memory concerns.  The patient has a pertinent medical history for anxiety, HLD, Crohns colitis, HTN, and CTS of the right hand.  The patent is followed with her PCP, and on 2/11/2019 it was indicted she had poor Minicog testing (2/5 score) as well as lowered score on SLUMS (16/30).  The patient was referred to neuropsychology and had testing done with Jasen Johnson, PhD, on 4/20/2020.  During this visit, it was noted that the patient felt like she had some changes in her memory, but her family was more concerned (, daughters). Interestingly, many of her symptoms were occurring at the onset of her  suffering a stroke 3 months earlier (lower energy, less sleep) and she was needing to take antidepressant medications which were helpful.  At the time of the evaluation, the patient was managing all her daily activities, medications, meal prep, finances, and driving.  Patterns of weakness were noted in the patients frontal and subcortical brain regions with mild compromise of temporal lobes b/l.  There was thought that the severity of her cognitive weakness was compatible with a mild cognitive impairment.  Follow up in 6-12 months was recommended. The patient had an MRI brain done 12/28/2020 with no major signs of atrophy or vascular disease noted (see data for  my interpretation).    Today, the patient is present with her daughter today.  The patient feels that she has had memory issues for one year.  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  The patient notes that when she goes to the grocery store she needs to bring a list with her.  The patient still drives, hasn't had any accidents, and hasn't gotten lost while driving.  She hasn't left the stove on, or not-finished tasks in the house, or mis-placed objects.  She has issues with recalling peoples names in her classes, but not of her family.  She needs a planner for her daily activities.  She manages her own medications.      The patient is taking melatonin for sleep aide in the last 2-3 months, as she has trouble going to sleep (mind is racing at night thinking about things for the next day). She goes to bed at 1030 and wakes at 9am.  She doesn't need naps in the day.  She is living in [place], with her , and they manage the house-hold.  The patient's daughter manages the finances.  The patient is taking her medications appropriately.  The patient feels that around the time her  had a stroke, she became sad/jason/cried often. These feelings have improved dramatically over the last few weeks, as her husbands progress has improved.  She is planning on moving out of [place] to somewhere warm.      The patient's daughter indicates that her mother's forgetfulness is readily apparent.  The patient may forget something immediately and need to ask for people to repeat themselves. This type of memory issue has likely been going on for 1-2 years.  There is no change in personality (increased sexual behaviors, gambling, crude comments) but the patient does become more crabby/agitated at night.  At this point, the patient's daughter  is the primary care giver for her father, and is present for most evenings.       Past Medical History:     Patient Active Problem List   Diagnosis     Crohn's colitis (H)     Anxiety     Postmenopausal atrophic vaginitis     Advanced directives, counseling/discussion     High blood cholesterol     Left knee DJD     CTS (carpal tunnel syndrome)     Osteoporosis     Essential hypertension with goal blood pressure less than 140/90     History of gastroesophageal reflux (GERD)     Hiatal hernia     Memory loss     Past Medical History:   Diagnosis Date     Anxiety      Crohn's colitis (H)       Past Surgical History:     Past Surgical History:   Procedure Laterality Date     C/SECTION, LOW TRANSVERSE  x3    , Low Transverse     HC REDUCTION OF LARGE BREAST        Social History:   Graduated high-school. Full time .  Was a  at times and a .   55 years, have three children and 10 grandchildren.  Tobacco Use     Smoking status: Former Smoker     Quit date: 10/13/1992     Years since quittin.2     Smokeless tobacco: Never Used   Substance Use Topics     Alcohol use: No     Drug use: No      Family History:     Family History   Problem Relation Age of Onset     Cancer Mother      Hypertension Mother      Heart Disease Father      Diabetes No family hx of       Medications:     Current Outpatient Medications   Medication Sig     balsalazide (COLAZAL) 750 MG capsule 2 capsules daily      Calcium-Vitamin D (CALCIUM + D PO) Take  by mouth. ONCE DAILY.      citalopram (CELEXA) 40 MG tablet Take 1 tablet (40 mg) by mouth daily (Patient not taking: Reported on 2020)     hydrochlorothiazide (HYDRODIURIL) 25 MG tablet Take 1 tablet (25 mg) by mouth daily (Patient not taking: Reported on 2020)     Multiple Vitamin (MULTIVITAMIN OR) Take  by mouth. ONCE DAILY.      simvastatin (ZOCOR) 10 MG tablet Take 1 tablet (10 mg) by mouth At Bedtime (Patient not taking:  "Reported on 12/22/2020)     UNABLE TO FIND MEDICATION NAME: Cur Man Vitamin     UNABLE TO FIND MEDICATION NAME: Collagen vitamin      Allergies:   No major allergies     Review of Systems:   A comprehensive 10 point review of systems (constitutional, ENT, cardiac, peripheral vascular, lymphatic, respiratory, GI, , Musculoskeletal, skin, Neurological) was performed and found to be negative except as described in this note.      Physical Exam:   General: Seated comfortably in no acute distress. Well appearing. bright disposition.  HEENT: Neck supple with normal range of motion.   Skin: No rashes  Neurologic:     Mental Status: Fully alert, attentive and oriented. Speech clear and fluent. TICS30 score of 15/30 (recalls 3/10 words, cannot do serial 7s to any subtraction, error in counting backward from 20 to 1, difficulty with naming cactus, errors in repeating phrases \"no if and or buts\" (drops s in ifs).  Cannot recall was 9/11 was \"important [place] event\".       Cranial Nerves: EOMI with normal smooth pursuit. Facial movements symmetric. Hearing not formally tested but intact to conversation.  No dysarthria.     Motor: No tremors or other abnormal movements observed.      Sensory: Negative Romberg.      Gait: Normal, steady casual gait.         Data: Pertinent prior to visit   Imaging:  MRI brain w/wout contrast: 12/28/2020  IMPRESSION:  1. No acute abnormality.  2. Volume loss and white matter T2 hyperintensities which likely  represent chronic small vessel ischemic change.  - by my own read (image is distorted in multiple sequences making it difficult to fully interpret subtle atrophy.  In some coronal flair sequences there is slight decrease in b/l hippocampal structures, as well as temporal sulci increases b/l).    MRI cervical spine: 10/14/2005  C3-4:  The central spinal canal is normal.  No focal disc herniation.  The right neural foramen is mildly narrowed and the left neural  foramen is moderately narrowed " due to uncinate spurring.  C4-5:  The central spinal canal is normal.  No focal disc herniation.  The neural foramina are mild to moderately narrowed bilaterally.  C5-6:  Mild degenerative disc disease with marrow edema along the  end-plates and broad based disc bulging.  No focal disc herniation.  No indentation of the spinal cord.  The neural foramina are mild to  moderately narrowed bilaterally.  C6-7:  Mild degenerative disc disease with marrow edema along the  end-plates and broad based disc bulging.  No focal disc herniation.  The neural foramina are mild to moderately narrowed bilaterally.  C7-T1:  The central spinal canal and the neural foramina are normal.  The spinal cord shows minimal prominence of the central spinal canal  only seen on the FFE image to suggest motion artifact.  IMPRESSION  :  Straightening of the cervical spine.  Mild degenerative disc disease and disc bulging at C5-6 and C6-7.  Otherwise no focal disc herniation or central spinal stenosis.    Prior Labs: 2/21/2020, 11/3/2020  B12 - normal  TSH normal  Vitamin D normal  Hepatic and lipid panel normal except for elevated LDL  CBC and CPM normal         Assessment and Plan:   Assessment:  MCI amnestic type    The patient has had a 2-3 years progressive impairment of memory related functions with recent worsening during a period of stress/anxiety caused by her  having a stroke.  The patient has an MRI that shows subtle signs of hippocampal and temporal lobe atrophy (medial), but the image was altered by some interference (patient had on hair spray that had some metal in it) which makes it difficult to truly interpret.  Overall, her testing in 4/2020 and now indicates a mild cognitive impairment, with my findings showing primarily memory related deficits.  Exam is not suggestive for extrapyramidal signs, and clinical history isn't relevant for DLB, PD, FTD, prior heavy alcohol use, prior head trauma, or primary language or visual  deficits.  There is no history for seizure and no recent seizure like events.  At this time, the patient would benefit from OT evaluation to get a baseline on her daily function and determine possible increased need for supervision/home monitoring.  She would benefit from starting donepezil to reduce speed of progression into Alzheimer's dementia.  Given her TICS 30 score, I did recommend she seek to have a drivers assessment test.       Plan:  OT for CPT   Donepezil 5 mg at bedtime for 30 days, then 10 mg at bedtime  Repeat neuropsychological testing (in-person if possible)  Repeat MRI in 6-8 months (after follow up)  Driving test through [employer] (patient was given phone number to schedule this test)    Follow up in Neurology clinic in 6 months or earlier as needed should new concerns arise.    MIMI Walton D.O.   of Neurology      Total time (80 min) in this patient encounter was spent on pre-charting, counseling and/or coordination of care. We reviewed diagnostic results, impressions, and discussed other possible tests if symptoms do not improve. We discussed the implications of the diagnosis, as well as risks and benefits of management options. We reviewed treatment instructions and our scheduled follow-up as specified in the discharge plan. We also discussed the importance of compliance with the chosen course of treatment. The patient is in agreement with this plan and has no further questions.      Julia is a 75 year old who is being evaluated via a billable video visit.      How would you like to obtain your AVS? MyChart  If the video visit is dropped, the invitation should be resent by: Send to e-mail at: [email or web address]  Will anyone else be joining your video visit? No      Video Start Time: 11am  Video-Visit Details    Type of service:  Video Visit    Video End Time:12:08 PM    Originating Location (pt. Location): Home    Distant Location (provider location):    [facility] NEUROLOGY [facility]     Platform used for Video Visit: Kay Draper, EMT          Again, thank you for allowing me to participate in the care of your patient.      Sincerely,    Jorge Walton, DO

## 2021-01-14 NOTE — PROGRESS NOTES
Julia is a 75 year old who is being evaluated via a billable video visit.      How would you like to obtain your AVS? MyChart  If the video visit is dropped, the invitation should be resent by: Send to e-mail at: [email or web address]  Will anyone else be joining your video visit? No      Video Start Time: 11am  Video-Visit Details    Type of service:  Video Visit    Video End Time:12:08 PM    Originating Location (pt. Location): Home    Distant Location (provider location):  [facility] NEUROLOGY [facility]     Platform used for Video Visit: Kay Draper, EMT

## 2021-01-14 NOTE — PROGRESS NOTES
[facility] Neurology Consultation    Julia Tabares MRN# [id number]   Age: 75 year old YOB: 1945     Requesting physician: Savannah High     Reason for Consultation: memory concerns      History of Presenting Symptoms:   Julia Tabares is a 75 year old female who presents today for evaluation of memory concerns.  The patient has a pertinent medical history for anxiety, HLD, Crohns colitis, HTN, and CTS of the right hand.  The patent is followed with her PCP, and on 2/11/2019 it was indicted she had poor Minicog testing (2/5 score) as well as lowered score on SLUMS (16/30).  The patient was referred to neuropsychology and had testing done with Jasen Johnson, PhD, on 4/20/2020.  During this visit, it was noted that the patient felt like she had some changes in her memory, but her family was more concerned (, daughters). Interestingly, many of her symptoms were occurring at the onset of her  suffering a stroke 3 months earlier (lower energy, less sleep) and she was needing to take antidepressant medications which were helpful.  At the time of the evaluation, the patient was managing all her daily activities, medications, meal prep, finances, and driving.  Patterns of weakness were noted in the patients frontal and subcortical brain regions with mild compromise of temporal lobes b/l.  There was thought that the severity of her cognitive weakness was compatible with a mild cognitive impairment.  Follow up in 6-12 months was recommended. The patient had an MRI brain done 12/28/2020 with no major signs of atrophy or vascular disease noted (see data for my interpretation).    Today, the patient is present with her daughter today.  The patient feels that she has had memory issues for one year.  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  The patient notes that when she goes to the grocery store she needs to bring a list with her.  The patient still drives, hasn't had any accidents, and hasn't gotten lost while driving.  She hasn't left the stove on, or not-finished tasks in the house, or mis-placed objects.  She has issues with recalling peoples names in her classes, but not of her family.  She needs a planner for her daily activities.  She manages her own medications.      The patient is taking melatonin for sleep aide in the last 2-3 months, as she has trouble going to sleep (mind is racing at night thinking about things for the next day). She goes to bed at 1030 and wakes at 9am.  She doesn't need naps in the day.  She is living in [place], with her , and they manage the house-hold.  The patient's daughter manages the finances.  The patient is taking her medications appropriately.  The patient feels that around the time her  had a stroke, she became sad/jason/cried often. These feelings have improved dramatically over the last few weeks, as her husbands progress has improved.  She is planning on moving out of [place] to somewhere [facility].      The patient's daughter indicates that her mother's forgetfulness is readily apparent.  The patient may forget something immediately and need to ask for people to repeat themselves. This type of memory issue has likely been going on for 1-2 years.  There is no change in personality (increased sexual behaviors, gambling, crude comments) but the patient does become more crabby/agitated at night.  At this point, the patient's daughter is the primary care giver for her father, and is present for most evenings.       Past Medical History:     Patient Active Problem List   Diagnosis     Crohn's colitis (H)     Anxiety     Postmenopausal atrophic vaginitis     Advanced directives, counseling/discussion     High blood cholesterol     Left knee  DJD     CTS (carpal tunnel syndrome)     Osteoporosis     Essential hypertension with goal blood pressure less than 140/90     History of gastroesophageal reflux (GERD)     Hiatal hernia     Memory loss     Past Medical History:   Diagnosis Date     Anxiety      Crohn's colitis (H)       Past Surgical History:     Past Surgical History:   Procedure Laterality Date     C/SECTION, LOW TRANSVERSE  x3    , Low Transverse     HC REDUCTION OF LARGE BREAST        Social History:   Graduated high-school. Full time .  Was a  at times and a .   55 years, have three children and 10 grandchildren.  Tobacco Use     Smoking status: Former Smoker     Quit date: 10/13/1992     Years since quittin.2     Smokeless tobacco: Never Used   Substance Use Topics     Alcohol use: No     Drug use: No      Family History:     Family History   Problem Relation Age of Onset     Cancer Mother      Hypertension Mother      Heart Disease Father      Diabetes No family hx of       Medications:     Current Outpatient Medications   Medication Sig     balsalazide (COLAZAL) 750 MG capsule 2 capsules daily      Calcium-Vitamin D (CALCIUM + D PO) Take  by mouth. ONCE DAILY.      citalopram (CELEXA) 40 MG tablet Take 1 tablet (40 mg) by mouth daily (Patient not taking: Reported on 2020)     hydrochlorothiazide (HYDRODIURIL) 25 MG tablet Take 1 tablet (25 mg) by mouth daily (Patient not taking: Reported on 2020)     Multiple Vitamin (MULTIVITAMIN OR) Take  by mouth. ONCE DAILY.      simvastatin (ZOCOR) 10 MG tablet Take 1 tablet (10 mg) by mouth At Bedtime (Patient not taking: Reported on 2020)     UNABLE TO FIND MEDICATION NAME: Cur Man Vitamin     UNABLE TO FIND MEDICATION NAME: Collagen vitamin      Allergies:   No major allergies     Review of Systems:   A comprehensive 10 point review of systems (constitutional, ENT, cardiac, peripheral vascular, lymphatic, respiratory, GI,  ", Musculoskeletal, skin, Neurological) was performed and found to be negative except as described in this note.      Physical Exam:   General: Seated comfortably in no acute distress. Well appearing. bright disposition.  HEENT: Neck supple with normal range of motion.   Skin: No rashes  Neurologic:     Mental Status: Fully alert, attentive and oriented. Speech clear and fluent. TICS30 score of 15/30 (recalls 3/10 words, cannot do serial 7s to any subtraction, error in counting backward from 20 to 1, difficulty with naming cactus, errors in repeating phrases \"no if and or buts\" (drops s in ifs).  Cannot recall was 9/11 was \"important [place] event\".       Cranial Nerves: EOMI with normal smooth pursuit. Facial movements symmetric. Hearing not formally tested but intact to conversation.  No dysarthria.     Motor: No tremors or other abnormal movements observed.      Sensory: Negative Romberg.      Gait: Normal, steady casual gait.         Data: Pertinent prior to visit   Imaging:  MRI brain w/wout contrast: 12/28/2020  IMPRESSION:  1. No acute abnormality.  2. Volume loss and white matter T2 hyperintensities which likely  represent chronic small vessel ischemic change.  - by my own read (image is distorted in multiple sequences making it difficult to fully interpret subtle atrophy.  In some coronal flair sequences there is slight decrease in b/l hippocampal structures, as well as temporal sulci increases b/l).    MRI cervical spine: 10/14/2005  C3-4:  The central spinal canal is normal.  No focal disc herniation.  The right neural foramen is mildly narrowed and the left neural  foramen is moderately narrowed due to uncinate spurring.  C4-5:  The central spinal canal is normal.  No focal disc herniation.  The neural foramina are mild to moderately narrowed bilaterally.  C5-6:  Mild degenerative disc disease with marrow edema along the  end-plates and broad based disc bulging.  No focal disc herniation.  No indentation " of the spinal cord.  The neural foramina are mild to  moderately narrowed bilaterally.  C6-7:  Mild degenerative disc disease with marrow edema along the  end-plates and broad based disc bulging.  No focal disc herniation.  The neural foramina are mild to moderately narrowed bilaterally.  C7-T1:  The central spinal canal and the neural foramina are normal.  The spinal cord shows minimal prominence of the central spinal canal  only seen on the FFE image to suggest motion artifact.  IMPRESSION  :  Straightening of the cervical spine.  Mild degenerative disc disease and disc bulging at C5-6 and C6-7.  Otherwise no focal disc herniation or central spinal stenosis.    Prior Labs: 2/21/2020, 11/3/2020  B12 - normal  TSH normal  Vitamin D normal  Hepatic and lipid panel normal except for elevated LDL  CBC and CPM normal         Assessment and Plan:   Assessment:  MCI amnestic type    The patient has had a 2-3 years progressive impairment of memory related functions with recent worsening during a period of stress/anxiety caused by her  having a stroke.  The patient has an MRI that shows subtle signs of hippocampal and temporal lobe atrophy (medial), but the image was altered by some interference (patient had on hair spray that had some metal in it) which makes it difficult to truly interpret.  Overall, her testing in 4/2020 and now indicates a mild cognitive impairment, with my findings showing primarily memory related deficits.  Exam is not suggestive for extrapyramidal signs, and clinical history isn't relevant for DLB, PD, FTD, prior heavy alcohol use, prior head trauma, or primary language or visual deficits.  There is no history for seizure and no recent seizure like events.  At this time, the patient would benefit from OT evaluation to get a baseline on her daily function and determine possible increased need for supervision/home monitoring.  She would benefit from starting donepezil to reduce speed of  progression into Alzheimer's dementia.  Given her TICS 30 score, I did recommend she seek to have a drivers assessment test.       Plan:  OT for CPT   Donepezil 5 mg at bedtime for 30 days, then 10 mg at bedtime  Repeat neuropsychological testing (in-person if possible)  Repeat MRI in 6-8 months (after follow up)  Driving test through [employer] [facility] (patient was given phone number to schedule this test)    Follow up in Neurology clinic in 6 months or earlier as needed should new concerns arise.    MIMI Walton D.O.   of Neurology      Total time (80 min) in this patient encounter was spent on pre-charting, counseling and/or coordination of care. We reviewed diagnostic results, impressions, and discussed other possible tests if symptoms do not improve. We discussed the implications of the diagnosis, as well as risks and benefits of management options. We reviewed treatment instructions and our scheduled follow-up as specified in the discharge plan. We also discussed the importance of compliance with the chosen course of treatment. The patient is in agreement with this plan and has no further questions.

## 2021-02-12 ENCOUNTER — OFFICE VISIT (OUTPATIENT)
Dept: ORTHOPEDICS | Facility: CLINIC | Age: 76
End: 2021-02-12
Payer: COMMERCIAL

## 2021-02-12 VITALS
BODY MASS INDEX: 24.48 KG/M2 | HEIGHT: 62 IN | DIASTOLIC BLOOD PRESSURE: 82 MMHG | WEIGHT: 133 LBS | SYSTOLIC BLOOD PRESSURE: 181 MMHG

## 2021-02-12 DIAGNOSIS — M25.562 CHRONIC PAIN OF BOTH KNEES: Primary | ICD-10-CM

## 2021-02-12 DIAGNOSIS — G89.29 CHRONIC PAIN OF BOTH KNEES: Primary | ICD-10-CM

## 2021-02-12 DIAGNOSIS — M17.0 OSTEOARTHRITIS OF BOTH KNEES, UNSPECIFIED OSTEOARTHRITIS TYPE: ICD-10-CM

## 2021-02-12 DIAGNOSIS — M25.561 CHRONIC PAIN OF BOTH KNEES: Primary | ICD-10-CM

## 2021-02-12 PROCEDURE — 20611 DRAIN/INJ JOINT/BURSA W/US: CPT | Mod: 50 | Performed by: FAMILY MEDICINE

## 2021-02-12 PROCEDURE — 99214 OFFICE O/P EST MOD 30 MIN: CPT | Mod: 25 | Performed by: FAMILY MEDICINE

## 2021-02-12 RX ADMIN — TRIAMCINOLONE ACETONIDE 40 MG: 40 INJECTION, SUSPENSION INTRA-ARTICULAR; INTRAMUSCULAR at 15:00

## 2021-02-12 RX ADMIN — ROPIVACAINE HYDROCHLORIDE 3 ML: 5 INJECTION, SOLUTION EPIDURAL; INFILTRATION; PERINEURAL at 15:00

## 2021-02-12 ASSESSMENT — MIFFLIN-ST. JEOR: SCORE: 1059.71

## 2021-02-12 NOTE — PROGRESS NOTES
"Julia Tabares  :  1945  DOS: 2021  MRN: [id number]    Sports Medicine Clinic Visit    PCP: Savannah Ayala    uJlia Tabares is a 75 year old female who is seen in follow-up presenting with chronic bilateral knee pain.    Interim History - 2021  Since last visit in 2017 patient has moderate-severe bilateral knee pain and swelling over the past ~ one year.  Bilateral knee steroid injection was last completed in 2017 provided good relief for ~ 2+ years.  Patient notes that pain is worse with going from sit to stand, bending knees, and managing stairs.  No new injury in the interim.    Social History: works part-time teaching    Review of Systems  Musculoskeletal: as above  Remainder of review of systems is negative including constitutional, CV, pulmonary, GI, Skin and Neurologic except as noted in HPI or medical history.    Past Medical History:   Diagnosis Date     Anxiety      Crohn's colitis (H)      Past Surgical History:   Procedure Laterality Date     C/SECTION, LOW TRANSVERSE  x3    , Low Transverse     HC REDUCTION OF LARGE BREAST       Family History   Problem Relation Age of Onset     Cancer Mother      Hypertension Mother      Heart Disease Father      Diabetes No family hx of      Objective  BP (!) 181/82   Ht 1.58 m (5' 2.2\")   Wt 60.3 kg (133 lb)   BMI 24.17 kg/m        General: healthy, alert and in no distress      HEENT: no scleral icterus or conjunctival erythema     Skin: no suspicious lesions or rash. No jaundice.     CV: regular rhythm by palpation, 2+ distal pulses, no pedal edema      Resp: normal respiratory effort without conversational dyspnea     Psych: normal mood and affect      Gait: antalgic, appropriate coordination and balance     Neuro: normal light touch sensory exam of the extremities. Motor strength as noted below     Bilateral Knee exam    ROM:        Full active and passive ROM with flexion and extension       Painful " terminal flexion    Inspection:       no visible ecchymosis       Small effusion left and moderate right    Skin:       no visible deformities       well perfused       capillary refill brisk    Patellar Motion:        Normal patellar tracking noted through range of motion, crepitus in PF joint    Tender:        lateral patellar border       medial joint line       lateral joint line    Non Tender:         remainder of knee area    Special Tests:        neg (-) varus at 0 deg and 30 deg       neg (-) valgus at 0 deg and 30 deg    Evaluation of ipsilateral kinetic chain       normal strength with hip extension and abduction    Radiology  XR KNEE BILATERAL 1/2 VW   8/18/2016 1:18 PM      HISTORY: Pain in right knee, Other chronic pain     COMPARISON: None.                                                          IMPRESSION: Very advanced bilateral medial and patellofemoral  compartment degenerative changes. Small loose body in the superior  lateral aspect of the suprapatellar bursa measuring approximately 1.4  cm in diameter.    Large Joint Injection/Arthocentesis: bilateral knee    Date/Time: 2/12/2021 3:00 PM  Performed by: Shashi Solorzano DO  Authorized by: Shashi Solorzano DO     Indications:  Pain  Needle Size:  21 G  Guidance: ultrasound    Approach:  Superolateral  Location:  Knee  Laterality:  Bilateral      Medications (Right):  40 mg triamcinolone 40 MG/ML; 3 mL ropivacaine 5 MG/ML  Aspirate amount (mL):  23  Aspirate:  Serous and yellow  Medications (Left):  40 mg triamcinolone 40 MG/ML; 3 mL ropivacaine 5 MG/ML  Aspirate amount (mL):  5  Aspirate:  Serous and yellow  Outcome:  Tolerated well, no immediate complications  Procedure discussed: discussed risks, benefits, and alternatives    Consent Given by:  Patient  Timeout: timeout called immediately prior to procedure    Prep: patient was prepped and draped in usual sterile fashion        Assessment:  1. Chronic pain of both knees    2.  Osteoarthritis of both knees, unspecified osteoarthritis type        Plan:  Discussed the assessment with the patient.  Follow up: prn based on clinical progress  We discussed modified progressive pain-free activity as tolerated  US guided CSI and aspiration today  Reviewed limitations of CSI and goals of tx  PT options reviewed in detail  RICE and bracing options reviewed  Low impact activity strategies reviewed  XR images independently visualized and reviewed with patient today in clinic  Expectations and goals of CSI reviewed  Often 2-3 days for steroid effect, and can take up to two weeks for maximum effect  We discussed modified progressive pain-free activity as tolerated  Do not overuse in first two weeks if feeling better due to concern for vulnerability while steroid is working  Supportive care reviewed  All questions were answered today  Contact us with additional questions or concerns  Signs and sx of concern reviewed      Shashi Solorzano DO, CAQ  Primary Care Sports Medicine  [facility]       Time spent in chart review, one-on-one evaluation, discussion with patient regarding nature of problem, course, prior treatments, and therapeutic options, share-decision making, procedures and referrals as appropriate/documented, and charting related to the visit: 33 minutes        Disclaimer: This note consists of symbols derived from keyboarding, dictation and/or voice recognition software. As a result, there may be errors in the script that have gone undetected. Please consider this when interpreting information found in this chart.

## 2021-02-12 NOTE — LETTER
"    2021         RE: Julia Tabares  [address] [place] [address]        Dear Colleague,    Thank you for referring your patient, Julia Tabares, to the [facility]. Please see a copy of my visit note below.    Julia Tabares  :  1945  DOS: 2021  MRN: [id number]    Sports Medicine Clinic Visit    PCP: Savannah Ayala    Julia Tabares is a 75 year old female who is seen in follow-up presenting with chronic bilateral knee pain.    Interim History - 2021  Since last visit in 2017 patient has moderate-severe bilateral knee pain and swelling over the past ~ one year.  Bilateral knee steroid injection was last completed in 2017 provided good relief for ~ 2+ years.  Patient notes that pain is worse with going from sit to stand, bending knees, and managing stairs.  No new injury in the interim.    Social History: works part-time teaching    Review of Systems  Musculoskeletal: as above  Remainder of review of systems is negative including constitutional, CV, pulmonary, GI, Skin and Neurologic except as noted in HPI or medical history.    Past Medical History:   Diagnosis Date     Anxiety      Crohn's colitis (H)      Past Surgical History:   Procedure Laterality Date     C/SECTION, LOW TRANSVERSE  x3    , Low Transverse     HC REDUCTION OF LARGE BREAST       Family History   Problem Relation Age of Onset     Cancer Mother      Hypertension Mother      Heart Disease Father      Diabetes No family hx of      Objective  BP (!) 181/82   Ht 1.58 m (5' 2.2\")   Wt 60.3 kg (133 lb)   BMI 24.17 kg/m        General: healthy, alert and in no distress      HEENT: no scleral icterus or conjunctival erythema     Skin: no suspicious lesions or rash. No jaundice.     CV: regular rhythm by palpation, 2+ distal pulses, no pedal edema      Resp: normal respiratory effort without conversational dyspnea     Psych: normal mood " and affect      Gait: antalgic, appropriate coordination and balance     Neuro: normal light touch sensory exam of the extremities. Motor strength as noted below     Bilateral Knee exam    ROM:        Full active and passive ROM with flexion and extension       Painful terminal flexion    Inspection:       no visible ecchymosis       Small effusion left and moderate right    Skin:       no visible deformities       well perfused       capillary refill brisk    Patellar Motion:        Normal patellar tracking noted through range of motion, crepitus in PF joint    Tender:        lateral patellar border       medial joint line       lateral joint line    Non Tender:         remainder of knee area    Special Tests:        neg (-) varus at 0 deg and 30 deg       neg (-) valgus at 0 deg and 30 deg    Evaluation of ipsilateral kinetic chain       normal strength with hip extension and abduction    Radiology  XR KNEE BILATERAL 1/2 VW   8/18/2016 1:18 PM      HISTORY: Pain in right knee, Other chronic pain     COMPARISON: None.                                                          IMPRESSION: Very advanced bilateral medial and patellofemoral  compartment degenerative changes. Small loose body in the superior  lateral aspect of the suprapatellar bursa measuring approximately 1.4  cm in diameter.    Large Joint Injection/Arthocentesis: bilateral knee    Date/Time: 2/12/2021 3:00 PM  Performed by: Shashi Solorzano DO  Authorized by: Shashi Solorzano DO     Indications:  Pain  Needle Size:  21 G  Guidance: ultrasound    Approach:  Superolateral  Location:  Knee  Laterality:  Bilateral      Medications (Right):  40 mg triamcinolone 40 MG/ML; 3 mL ropivacaine 5 MG/ML  Aspirate amount (mL):  23  Aspirate:  Serous and yellow  Medications (Left):  40 mg triamcinolone 40 MG/ML; 3 mL ropivacaine 5 MG/ML  Aspirate amount (mL):  5  Aspirate:  Serous and yellow  Outcome:  Tolerated well, no immediate  complications  Procedure discussed: discussed risks, benefits, and alternatives    Consent Given by:  Patient  Timeout: timeout called immediately prior to procedure    Prep: patient was prepped and draped in usual sterile fashion        Assessment:  1. Chronic pain of both knees    2. Osteoarthritis of both knees, unspecified osteoarthritis type        Plan:  Discussed the assessment with the patient.  Follow up: prn based on clinical progress  We discussed modified progressive pain-free activity as tolerated  US guided CSI and aspiration today  Reviewed limitations of CSI and goals of tx  PT options reviewed in detail  RICE and bracing options reviewed  Low impact activity strategies reviewed  XR images independently visualized and reviewed with patient today in clinic  Expectations and goals of CSI reviewed  Often 2-3 days for steroid effect, and can take up to two weeks for maximum effect  We discussed modified progressive pain-free activity as tolerated  Do not overuse in first two weeks if feeling better due to concern for vulnerability while steroid is working  Supportive care reviewed  All questions were answered today  Contact us with additional questions or concerns  Signs and sx of concern reviewed      Shashi Solorzano DO, CAQ  Primary Care Sports Medicine  [facility]       Time spent in chart review, one-on-one evaluation, discussion with patient regarding nature of problem, course, prior treatments, and therapeutic options, share-decision making, procedures and referrals as appropriate/documented, and charting related to the visit: 33 minutes        Disclaimer: This note consists of symbols derived from keyboarding, dictation and/or voice recognition software. As a result, there may be errors in the script that have gone undetected. Please consider this when interpreting information found in this chart.      Again, thank you for allowing me to participate in the care of your  patient.        Sincerely,        Shashi Solorzano, DO

## 2021-02-15 RX ORDER — TRIAMCINOLONE ACETONIDE 40 MG/ML
40 INJECTION, SUSPENSION INTRA-ARTICULAR; INTRAMUSCULAR
Status: SHIPPED | OUTPATIENT
Start: 2021-02-12

## 2021-02-15 RX ORDER — ROPIVACAINE HYDROCHLORIDE 5 MG/ML
3 INJECTION, SOLUTION EPIDURAL; INFILTRATION; PERINEURAL
Status: SHIPPED | OUTPATIENT
Start: 2021-02-12

## 2021-03-02 ENCOUNTER — TELEPHONE (OUTPATIENT)
Dept: FAMILY MEDICINE | Facility: CLINIC | Age: 76
End: 2021-03-02

## 2021-03-02 NOTE — TELEPHONE ENCOUNTER
Julia did not come to her scheduled appointment today.  And I know she has had a few others since I last saw her.  But I also want to make sure we follow up on her blood pressure.    If we have a consent to communicate form on file - it might be best to reach out to her daughter to get assistance with rescheduling.    Okay to use a Same day spot if needed for rescheduling.    Thanks

## 2021-03-02 NOTE — TELEPHONE ENCOUNTER
Spoke to patient's daughter Buffy, appointment for patient re-scheduled for 3/16/2021 at 2 pm.  Cindi Acuña CMA ([facility])

## 2021-04-13 ENCOUNTER — OFFICE VISIT (OUTPATIENT)
Dept: FAMILY MEDICINE | Facility: CLINIC | Age: 76
End: 2021-04-13
Payer: COMMERCIAL

## 2021-04-13 VITALS
SYSTOLIC BLOOD PRESSURE: 134 MMHG | WEIGHT: 134.4 LBS | TEMPERATURE: 97.8 F | HEART RATE: 60 BPM | OXYGEN SATURATION: 98 % | DIASTOLIC BLOOD PRESSURE: 70 MMHG | BODY MASS INDEX: 24.42 KG/M2

## 2021-04-13 DIAGNOSIS — K50.10 CROHN'S COLITIS, WITHOUT COMPLICATIONS (H): ICD-10-CM

## 2021-04-13 DIAGNOSIS — I10 ESSENTIAL HYPERTENSION WITH GOAL BLOOD PRESSURE LESS THAN 140/90: Primary | ICD-10-CM

## 2021-04-13 DIAGNOSIS — G31.84 MILD COGNITIVE IMPAIRMENT WITH MEMORY LOSS: ICD-10-CM

## 2021-04-13 DIAGNOSIS — Z12.31 ENCOUNTER FOR SCREENING MAMMOGRAM FOR BREAST CANCER: ICD-10-CM

## 2021-04-13 PROCEDURE — 99214 OFFICE O/P EST MOD 30 MIN: CPT | Performed by: FAMILY MEDICINE

## 2021-04-13 PROCEDURE — 80048 BASIC METABOLIC PNL TOTAL CA: CPT | Performed by: FAMILY MEDICINE

## 2021-04-13 PROCEDURE — 36415 COLL VENOUS BLD VENIPUNCTURE: CPT | Performed by: FAMILY MEDICINE

## 2021-04-13 ASSESSMENT — PAIN SCALES - GENERAL: PAINLEVEL: NO PAIN (0)

## 2021-04-13 NOTE — PROGRESS NOTES
Assessment & Plan     (I10) Essential hypertension with goal blood pressure less than 140/90  (primary encounter diagnosis)  Comment: well controlled on hydrochlorothiazide which she is taking daily  Plan: Basic metabolic panel        adjust therapy based on labs  Continue current medication      (G31.84) Mild cognitive impairment with memory loss  Comment: diagnosis confirmed by neurology  Plan: encouraged to set up plans in near term if move is not imminent, or in [place] if move is soon, to have OT eval and formal Neuropsych testing.    (K50.10) Crohn's colitis, without complications (H)  Comment: managed by [facility], due for colonoscopy according to previous records  Plan: encouraged to contact [facility]    (Z12.31) Encounter for screening mammogram for breast cancer  Comment:   Plan: MA SCREENING DIGITAL BILAT - Future  (s+30)          Discussed COVID vaccine, and patient declines.               Will be due for preventive visit in 6 months.  If was not in the process of moving, would recommend follow up in 3 months to review progress on memory evaluation and next steps.  Encouraged to seek Primary Care relationship early after move to [place].  And will provide refills in meantime of her hydrochlorothiazide to help keep blood pressure well controlled.    Return in about 6 months (around 10/13/2021) for Preventive Health visit (physical).    Savannah Irving MD  [facility]    Yesi Dumont is a 75 year old who presents for the following health issues  accompanied by her Daughter      Did have a virtual visit with neurology.  Mild cognitive impairment.  Recommended additional neuropsych testing and OT evaluation.  Has not yet gone forward with those recommendations.  Elected not to begin aricept.    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  Keeps her active and has no difficulty remembering to get to class.    Is also working with a nutritionist - who advises on vitamins and herbs to  help with memory and wellbeing.    Sold her home and she and her  are moving to [place].  Has a son and a daughter that live in [place].    HPI     Hypertension Follow-up      Do you check your blood pressure regularly outside of the clinic? No     Are you following a low salt diet? No    Are your blood pressures ever more than 140 on the top number (systolic) OR more   than 90 on the bottom number (diastolic), for example 140/90? No      How many servings of fruits and vegetables do you eat daily?  0-1    On average, how many sweetened beverages do you drink each day (Examples: soda, juice, sweet tea, etc.  Do NOT count diet or artificially sweetened beverages)?   2    How many days per week do you exercise enough to make your heart beat faster? 4    How many minutes a day do you exercise enough to make your heart beat faster? 60 or more    How many days per week do you miss taking your medication? 0        Review of Systems   Constitutional, HEENT, cardiovascular, pulmonary, gi and gu systems are negative, except as otherwise noted.      Objective    /70 (BP Location: Right arm, Patient Position: Sitting, Cuff Size: Adult Regular)   Pulse 60   Temp 97.8  F (36.6  C) (Oral)   Wt 61 kg (134 lb 6.4 oz)   SpO2 98%   BMI 24.42 kg/m    Body mass index is 24.42 kg/m .  Physical Exam   GENERAL: healthy, alert and no distress  RESP: lungs clear to auscultation - no rales, rhonchi or wheezes  CV: regular rate and rhythm, normal S1 S2, no S3 or S4, no murmur, click or rub, no peripheral edema and peripheral pulses strong  PSYCH: mentation appears normal, affect normal/bright

## 2021-04-13 NOTE — LETTER
April 16, 2021      Julia Tabares  [address] [place] [address]        Dear ,    We are writing to inform you of your  Normal kidneys and potassium test results.      Resulted Orders   Basic metabolic panel   Result Value Ref Range    Sodium 139 133 - 144 mmol/L    Potassium 3.8 3.4 - 5.3 mmol/L    Chloride 101 94 - 109 mmol/L    Carbon Dioxide 29 20 - 32 mmol/L    Anion Gap 9 3 - 14 mmol/L    Glucose 85 70 - 99 mg/dL    Urea Nitrogen 16 7 - 30 mg/dL    Creatinine 0.73 0.52 - 1.04 mg/dL    GFR Estimate 81 >60 mL/min/[1.73_m2]      Comment:      Non  GFR Calc  Starting 12/18/2018, serum creatinine based estimated GFR (eGFR) will be   calculated using the Chronic Kidney Disease Epidemiology Collaboration   (CKD-EPI) equation.      GFR Estimate If Black >90 >60 mL/min/[1.73_m2]      Comment:       GFR Calc  Starting 12/18/2018, serum creatinine based estimated GFR (eGFR) will be   calculated using the Chronic Kidney Disease Epidemiology Collaboration   (CKD-EPI) equation.      Calcium 9.3 8.5 - 10.1 mg/dL       If you have any questions or concerns, please call the clinic at the number listed above.       Sincerely,      Savannah Ayala MD/chevy

## 2021-04-14 LAB
ANION GAP SERPL CALCULATED.3IONS-SCNC: 9 MMOL/L (ref 3–14)
BUN SERPL-MCNC: 16 MG/DL (ref 7–30)
CALCIUM SERPL-MCNC: 9.3 MG/DL (ref 8.5–10.1)
CHLORIDE SERPL-SCNC: 101 MMOL/L (ref 94–109)
CO2 SERPL-SCNC: 29 MMOL/L (ref 20–32)
CREAT SERPL-MCNC: 0.73 MG/DL (ref 0.52–1.04)
GFR SERPL CREATININE-BSD FRML MDRD: 81 ML/MIN/{1.73_M2}
GLUCOSE SERPL-MCNC: 85 MG/DL (ref 70–99)
POTASSIUM SERPL-SCNC: 3.8 MMOL/L (ref 3.4–5.3)
SODIUM SERPL-SCNC: 139 MMOL/L (ref 133–144)

## 2021-04-27 ENCOUNTER — TELEPHONE (OUTPATIENT)
Dept: NEUROLOGY | Facility: CLINIC | Age: 76
End: 2021-04-27

## 2021-04-27 NOTE — TELEPHONE ENCOUNTER
LVM to pt's daughter Buffy. Pt is due for 6 month follow-up with Dr Walton, around 7/14/21, via video.

## 2021-05-04 ENCOUNTER — TELEPHONE (OUTPATIENT)
Dept: NEUROLOGY | Facility: CLINIC | Age: 76
End: 2021-05-04

## 2021-05-04 NOTE — TELEPHONE ENCOUNTER
Spoke with pt's daughter Carolina, who informed that the pt is moving out states this week and will be reaching out to Dr Walton for a referral.

## 2021-10-01 ENCOUNTER — TRANSFERRED RECORDS (OUTPATIENT)
Dept: HEALTH INFORMATION MANAGEMENT | Facility: CLINIC | Age: 76
End: 2021-10-01

## 2021-12-03 ENCOUNTER — TRANSFERRED RECORDS (OUTPATIENT)
Dept: HEALTH INFORMATION MANAGEMENT | Facility: CLINIC | Age: 76
End: 2021-12-03

## 2021-12-27 ENCOUNTER — TRANSFERRED RECORDS (OUTPATIENT)
Dept: HEALTH INFORMATION MANAGEMENT | Facility: CLINIC | Age: 76
End: 2021-12-27

## 2022-04-18 ENCOUNTER — TRANSFERRED RECORDS (OUTPATIENT)
Dept: HEALTH INFORMATION MANAGEMENT | Facility: CLINIC | Age: 77
End: 2022-04-18

## 2023-05-12 ENCOUNTER — TRANSFERRED RECORDS (OUTPATIENT)
Dept: HEALTH INFORMATION MANAGEMENT | Facility: CLINIC | Age: 78
End: 2023-05-12

## 2023-08-23 ENCOUNTER — TRANSFERRED RECORDS (OUTPATIENT)
Dept: HEALTH INFORMATION MANAGEMENT | Facility: CLINIC | Age: 78
End: 2023-08-23

## 2023-12-01 NOTE — TELEPHONE ENCOUNTER
Relayed to patient, and she opts for #2.  Assisted with scheduling fasting labs for 1/18/19 and will go from there.    Elenita Reyes RN     98

## 2024-04-19 ENCOUNTER — TRANSFERRED RECORDS (OUTPATIENT)
Dept: HEALTH INFORMATION MANAGEMENT | Facility: CLINIC | Age: 79
End: 2024-04-19

## 2024-07-26 ENCOUNTER — TELEPHONE (OUTPATIENT)
Dept: FAMILY MEDICINE | Facility: CLINIC | Age: 79
End: 2024-07-26
Payer: COMMERCIAL

## 2024-07-26 NOTE — TELEPHONE ENCOUNTER
Called patients daughter and scheduled an appointment.    EULALIO Pickering  [facility]

## 2024-07-26 NOTE — TELEPHONE ENCOUNTER
Reason for Call:  Appointment Request    Patient requesting this type of appt:  Preventive     Requested provider: Savannah Ayala    Reason patient unable to be scheduled: Not within requested timeframe    When does patient want to be seen/preferred time:  as soon as possible    Comments: pt needs an annual exam as soon as possible she just moved back here and her daughter wants her in right away. Has not seen a Dr since she left    Okay to leave a detailed message?: Yes at Cell number on file:    Telephone Information:   Mobile [phone]       Call taken on 7/26/2024 at 2:32 PM by Carlene Singleton

## 2024-08-13 ENCOUNTER — OFFICE VISIT (OUTPATIENT)
Dept: FAMILY MEDICINE | Facility: CLINIC | Age: 79
End: 2024-08-13
Payer: COMMERCIAL

## 2024-08-13 VITALS
RESPIRATION RATE: 16 BRPM | WEIGHT: 143.6 LBS | HEIGHT: 62 IN | TEMPERATURE: 98 F | SYSTOLIC BLOOD PRESSURE: 115 MMHG | DIASTOLIC BLOOD PRESSURE: 74 MMHG | OXYGEN SATURATION: 98 % | HEART RATE: 70 BPM | BODY MASS INDEX: 26.43 KG/M2

## 2024-08-13 DIAGNOSIS — F32.1 CURRENT MODERATE EPISODE OF MAJOR DEPRESSIVE DISORDER WITHOUT PRIOR EPISODE (H): ICD-10-CM

## 2024-08-13 DIAGNOSIS — I10 BENIGN ESSENTIAL HYPERTENSION: ICD-10-CM

## 2024-08-13 DIAGNOSIS — F02.80 ALZHEIMER'S DISEASE (H): ICD-10-CM

## 2024-08-13 DIAGNOSIS — F41.9 ANXIETY: ICD-10-CM

## 2024-08-13 DIAGNOSIS — Z00.00 ROUTINE GENERAL MEDICAL EXAMINATION AT A HEALTH CARE FACILITY: Primary | ICD-10-CM

## 2024-08-13 DIAGNOSIS — G30.9 ALZHEIMER'S DISEASE (H): ICD-10-CM

## 2024-08-13 DIAGNOSIS — M17.0 PRIMARY OSTEOARTHRITIS OF BOTH KNEES: ICD-10-CM

## 2024-08-13 PROBLEM — R41.3 MEMORY LOSS: Status: RESOLVED | Noted: 2019-02-11 | Resolved: 2024-08-13

## 2024-08-13 LAB
ERYTHROCYTE [DISTWIDTH] IN BLOOD BY AUTOMATED COUNT: 12.6 % (ref 10–15)
HCT VFR BLD AUTO: 40.5 % (ref 35–47)
HGB BLD-MCNC: 13.3 G/DL (ref 11.7–15.7)
MCH RBC QN AUTO: 29.7 PG (ref 26.5–33)
MCHC RBC AUTO-ENTMCNC: 32.8 G/DL (ref 31.5–36.5)
MCV RBC AUTO: 90 FL (ref 78–100)
PLATELET # BLD AUTO: 349 10E3/UL (ref 150–450)
RBC # BLD AUTO: 4.48 10E6/UL (ref 3.8–5.2)
WBC # BLD AUTO: 8.9 10E3/UL (ref 4–11)

## 2024-08-13 PROCEDURE — 96127 BRIEF EMOTIONAL/BEHAV ASSMT: CPT | Performed by: FAMILY MEDICINE

## 2024-08-13 PROCEDURE — 99214 OFFICE O/P EST MOD 30 MIN: CPT | Mod: 25 | Performed by: FAMILY MEDICINE

## 2024-08-13 PROCEDURE — 84443 ASSAY THYROID STIM HORMONE: CPT | Performed by: FAMILY MEDICINE

## 2024-08-13 PROCEDURE — 99387 INIT PM E/M NEW PAT 65+ YRS: CPT | Performed by: FAMILY MEDICINE

## 2024-08-13 PROCEDURE — 80053 COMPREHEN METABOLIC PANEL: CPT | Performed by: FAMILY MEDICINE

## 2024-08-13 PROCEDURE — 36415 COLL VENOUS BLD VENIPUNCTURE: CPT | Performed by: FAMILY MEDICINE

## 2024-08-13 PROCEDURE — 85027 COMPLETE CBC AUTOMATED: CPT | Performed by: FAMILY MEDICINE

## 2024-08-13 RX ORDER — HYDROCHLOROTHIAZIDE 25 MG/1
25 TABLET ORAL DAILY
Qty: 90 TABLET | Refills: 3 | Status: SHIPPED | OUTPATIENT
Start: 2024-08-13

## 2024-08-13 RX ORDER — ESCITALOPRAM OXALATE 20 MG/1
20 TABLET ORAL DAILY
Qty: 90 TABLET | Refills: 3 | Status: SHIPPED | OUTPATIENT
Start: 2024-08-13

## 2024-08-13 RX ORDER — BUSPIRONE HYDROCHLORIDE 10 MG/1
10 TABLET ORAL 3 TIMES DAILY
Qty: 270 TABLET | Refills: 3 | Status: SHIPPED | OUTPATIENT
Start: 2024-08-13

## 2024-08-13 SDOH — HEALTH STABILITY: PHYSICAL HEALTH: ON AVERAGE, HOW MANY DAYS PER WEEK DO YOU ENGAGE IN MODERATE TO STRENUOUS EXERCISE (LIKE A BRISK WALK)?: 3 DAYS

## 2024-08-13 ASSESSMENT — SOCIAL DETERMINANTS OF HEALTH (SDOH): HOW OFTEN DO YOU GET TOGETHER WITH FRIENDS OR RELATIVES?: MORE THAN THREE TIMES A WEEK

## 2024-08-13 ASSESSMENT — PATIENT HEALTH QUESTIONNAIRE - PHQ9
10. IF YOU CHECKED OFF ANY PROBLEMS, HOW DIFFICULT HAVE THESE PROBLEMS MADE IT FOR YOU TO DO YOUR WORK, TAKE CARE OF THINGS AT HOME, OR GET ALONG WITH OTHER PEOPLE: VERY DIFFICULT
SUM OF ALL RESPONSES TO PHQ QUESTIONS 1-9: 13
SUM OF ALL RESPONSES TO PHQ QUESTIONS 1-9: 13

## 2024-08-13 NOTE — PROGRESS NOTES
"Preventive Care Visit  [facility]  Savannah Irving MD, Family Medicine  Aug 13, 2024      Assessment & Plan     (Z00.00) Routine general medical examination at a health care facility  (primary encounter diagnosis)  Comment: returning to [place] after being away since 2021  Plan: PRIMARY CARE FOLLOW-UP SCHEDULING        Has provided records from [place] which I will need to review.    (I10) Benign essential hypertension  Comment: well controlled  Plan: hydrochlorothiazide (HYDRODIURIL) 25 MG tablet,        CBC with platelets, Comprehensive metabolic         panel (BMP + Alb, Alk Phos, ALT, AST, Total.         Bili, TP)        Continue current medication      (G30.9,  F02.80) Alzheimer's disease (H)  Comment: formally diagnosed in [place], was receiving palliative care in [place]  Plan: Home Care Referral        Provided home care referral to assist daughter with recommendations and support.    (F32.1) Current moderate episode of major depressive disorder without prior episode (H)  Comment: not suicidal, has ups and downs  Plan: escitalopram (LEXAPRO) 20 MG tablet        Continue current medication  Discussed redirection related to grief    (F41.9) Anxiety  Comment: was utilizing 10mg bid with a prn  Plan: busPIRone (BUSPAR) 10 MG tablet, TSH with free         T4 reflex        Encouraged to trial 10mg tid.  Will also lower melatonin down to 1 mg at bedtime.  And then can consider seroquel if needed.      (M17.0) Primary osteoarthritis of both knees  Comment: has previously received injections  Plan: will monitor        Encouraged to explore resources through [facility] [place].  And through home care .    I will also see if there is research related to alzheimers agitation and cannabis.    Will follow up in one month with me.             BMI  Estimated body mass index is 26.19 kg/m  as calculated from the following:    Height as of this encounter: 1.577 m (5' 2.09\").    Weight as of " this encounter: 65.1 kg (143 lb 9.6 oz).       Depression Screening Follow Up        8/13/2024     4:25 PM   PHQ   PHQ-9 Total Score 13   Q9: Thoughts of better off dead/self-harm past 2 weeks Several days   F/U: Thoughts of suicide or self-harm No   F/U: Safety concerns No                  No data to display                      Follow Up Actions Taken  Patient to follow up with PCP.  Clinic staff to schedule appointment if able.    Discussed the following ways the patient can remain in a safe environment:  be around others  Counseling  Appropriate preventive services were addressed with this patient via screening, questionnaire, or discussion as appropriate for fall prevention, nutrition, physical activity, Tobacco-use cessation, weight loss and cognition.  Checklist reviewing preventive services available has been given to the patient.  Reviewed patient's diet, addressing concerns and/or questions.   She is at risk for lack of exercise and has been provided with information to increase physical activity for the benefit of her well-being.   The patient was instructed to see the dentist every 6 months.   Updated plan of care.  Patient reported difficulty with activities of daily living were addressed today.The patient's PHQ-9 score is consistent with moderate depression. She was provided with information regarding depression.           Yesi Dumont is a 78 year old, presenting for the following:  Physical        8/13/2024     4:29 PM   Additional Questions   Roomed by Katelyn WHITMAN MA   Accompanied by Daughter Buffy ROLDAN.         8/13/2024     4:29 PM   Patient Reported Additional Medications   Patient reports taking the following new medications Escitalopram 20 mg 1 tablet at bedtime, Hydrochlorothiazide 25 mg 1 tablet at bedtime, Buspirone 10 mg 1 tablet 3 times a day, mushroom superfood (OM Brain fuel 2,000 mg mushroom lion's ruthie and folate), Melatonin 5 mg at bedtime, Hemp oil [facility]  Directive  Patient does not have a Health Care Directive or Living Will: Discussed advance care planning with patient; information given to patient to review.    CONSUELO    Has another daughter and a son.  Corrine and Shashi    She and  Sold their house in [place] in 2021  Lived about 5 min from other sister.  valencia's house declined and they moved into .   passed away Feb 2024.  Isamar moved in with Buffy May12th.    Was under palliative care in [place].    Has been formally diagnosed with Alzheimer's  Has subsequently had other MRI's and evaluations since her time in 2021.    Gives some mushroom capsules - pollo hendricks mushroom 2 capsules daily  5 mg melatonin nightly    Has agitation, confusion, sadness.  Cries a lot  I feel lonely, I don't belong, I don't feel like myself, I miss my family, I am burden, Is my  coming over.    Works in an adult  teaching stretch class regularly still.    Discuss Medical cannabis.    Possible UTI? Pt has Alzhemier's. Check urine.   Because of disorientation. For past 3 months    Stopped taking atorvastatin 4 months ago.     Would like a handicap sticker for a car.     Dry cough. Occasional. Does not seem to be any trigger and is very intermittent.    Bilateral knee arthritis.  Did have injections in May 2024 just before moving from [place].    Doesn't do dairy or gluten.  Does juicing once a day.    Fell and injured left middle finger within the past three months  Seems to stumble more when the ground is uneven.            8/13/2024   General Health   How would you rate your overall physical health? (!) FAIR   Feel stress (tense, anxious, or unable to sleep) To some extent      (!) STRESS CONCERN      8/13/2024   Nutrition   Diet: Gluten-free/reduced            8/13/2024   Exercise   Days per week of moderate/strenous exercise 3 days            8/13/2024   Social Factors   Frequency of gathering with friends or relatives More than three times a week   Worry food  won't last until get money to buy more No   Food not last or not have enough money for food? No   Do you have housing? (Housing is defined as stable permanent housing and does not include staying ouside in a car, in a tent, in an abandoned building, in an overnight shelter, or couch-surfing.) Yes   Are you worried about losing your housing? No   Lack of transportation? No   Unable to get utilities (heat,electricity)? No            8/13/2024   Fall Risk   Fallen 2 or more times in the past year? No   Trouble with walking or balance? Yes              8/13/2024   Activities of Daily Living- Home Safety   Needs help with the following daily activites Telephone use    Transportation    Shopping    Preparing meals    Housework    Bathing    Laundry    Medication administration    Money management    Toileting    Feeding    Dressing   Safety concerns in the home None of the above       Multiple values from one day are sorted in reverse-chronological order         8/13/2024   Dental   Dentist two times every year? (!) NO            8/13/2024   Hearing Screening   Hearing concerns? None of the above            8/13/2024   Driving Risk Screening   Patient/family members have concerns about driving (!) DECLINE            8/13/2024   General Alertness/Fatigue Screening   Have you been more tired than usual lately? No            8/13/2024   Urinary Incontinence Screening   Bothered by leaking urine in past 6 months No            8/13/2024   TB Screening   Were you born outside of the [place]? Yes          Today's PHQ-9 Score:       8/13/2024     4:25 PM   PHQ-9 SCORE   PHQ-9 Total Score MyChart 13 (Moderate depression)   PHQ-9 Total Score 13         8/13/2024   Substance Use   Alcohol more than 3/day or more than 7/wk Not Applicable   Do you have a current opioid prescription? No   How severe/bad is pain from 1 to 10? 0/10 (No Pain)   Do you use any other substances recreationally? No    (!) DECLINE       Multiple values from one day  "are sorted in reverse-chronological order     Social History     Tobacco Use    Smoking status: Former     Current packs/day: 0.00     Types: Cigarettes     Quit date: 10/13/1992     Years since quittin.8    Smokeless tobacco: Never   Substance Use Topics    Alcohol use: No    Drug use: No          Mammogram Screening - After age 74- determine frequency with patient based on health status, life expectancy and patient goals    ASCVD Risk   The ASCVD Risk score (Jaya HERMOSILLO, et al., 2019) failed to calculate for the following reasons:    The BP treatment status is invalid            Reviewed and updated as needed this visit by Provider                      Current providers sharing in care for this patient include:  Patient Care Team:  Savannah Ayala MD as PCP - General (Family Practice)  Savannah Ayala MD as Referring Physician (Family Medicine)  Jorge Walton DO as MD (Neurology)    The following health maintenance items are reviewed in Epic and correct as of today:  There are no preventive care reminders to display for this patient.      Review of Systems  Constitutional, HEENT, cardiovascular, pulmonary, gi and gu systems are negative, except as otherwise noted.     Objective    Exam  /74 (BP Location: Right arm, Patient Position: Chair, Cuff Size: Adult Regular)   Pulse 70   Temp 98  F (36.7  C) (Oral)   Resp 16   Ht 1.577 m (5' 2.09\")   Wt 65.1 kg (143 lb 9.6 oz)   LMP  (LMP Unknown)   SpO2 98%   Breastfeeding No   BMI 26.19 kg/m     Estimated body mass index is 26.19 kg/m  as calculated from the following:    Height as of this encounter: 1.577 m (5' 2.09\").    Weight as of this encounter: 65.1 kg (143 lb 9.6 oz).    Physical Exam  GENERAL: alert and no distress  HENT: normal cephalic/atraumatic, right ear: normal: no effusions, no erythema, normal landmarks, and left ear: occluded with wax  RESP: lungs clear to auscultation - no rales, rhonchi or " wheezes  CV: regular rate and rhythm, normal S1 S2, no S3 or S4, no murmur, click or rub, no peripheral edema  MS: no gross musculoskeletal defects noted, no edema  PSYCH: affect normal/bright and mentation reflects dementia        8/13/2024   Mini Cog   Mini-Cog Not Completed (choose reason) Known dementia                Signed Electronically by: Savannah Irving MD

## 2024-08-13 NOTE — PATIENT INSTRUCTIONS
Patient Education   Preventive Care Advice   This is general advice given by our system to help you stay healthy. However, your care team may have specific advice just for you. Please talk to your care team about your preventive care needs.  Nutrition  Eat 5 or more servings of fruits and vegetables each day.  Try wheat bread, brown rice and whole grain pasta (instead of white bread, rice, and pasta).  Get enough calcium and vitamin D. Check the label on foods and aim for 100% of the RDA (recommended daily allowance).  Lifestyle  Exercise at least 150 minutes each week  (30 minutes a day, 5 days a week).  Do muscle strengthening activities 2 days a week. These help control your weight and prevent disease.  No smoking.  Wear sunscreen to prevent skin cancer.  Have a dental exam and cleaning every 6 months.  Yearly exams  See your health care team every year to talk about:  Any changes in your health.  Any medicines your care team has prescribed.  Preventive care, family planning, and ways to prevent chronic diseases.  Shots (vaccines)   HPV shots (up to age 26), if you've never had them before.  Hepatitis B shots (up to age 59), if you've never had them before.  COVID-19 shot: Get this shot when it's due.  Flu shot: Get a flu shot every year.  Tetanus shot: Get a tetanus shot every 10 years.  Pneumococcal, hepatitis A, and RSV shots: Ask your care team if you need these based on your risk.  Shingles shot (for age 50 and up)  General health tests  Diabetes screening:  Starting at age 35, Get screened for diabetes at least every 3 years.  If you are younger than age 35, ask your care team if you should be screened for diabetes.  Cholesterol test: At age 39, start having a cholesterol test every 5 years, or more often if advised.  Bone density scan (DEXA): At age 50, ask your care team if you should have this scan for osteoporosis (brittle bones).  Hepatitis C: Get tested at least once in your life.  STIs (sexually  transmitted infections)  Before age 24: Ask your care team if you should be screened for STIs.  After age 24: Get screened for STIs if you're at risk. You are at risk for STIs (including HIV) if:  You are sexually active with more than one person.  You don't use condoms every time.  You or a partner was diagnosed with a sexually transmitted infection.  If you are at risk for HIV, ask about PrEP medicine to prevent HIV.  Get tested for HIV at least once in your life, whether you are at risk for HIV or not.  Cancer screening tests  Cervical cancer screening: If you have a cervix, begin getting regular cervical cancer screening tests starting at age 21.  Breast cancer scan (mammogram): If you've ever had breasts, begin having regular mammograms starting at age 40. This is a scan to check for breast cancer.  Colon cancer screening: It is important to start screening for colon cancer at age 45.  Have a colonoscopy test every 10 years (or more often if you're at risk) Or, ask your provider about stool tests like a FIT test every year or Cologuard test every 3 years.  To learn more about your testing options, visit:   .  For help making a decision, visit:   https://bit.ly/xu27790.  Prostate cancer screening test: If you have a prostate, ask your care team if a prostate cancer screening test (PSA) at age 55 is right for you.  Lung cancer screening: If you are a current or former smoker ages 50 to 80, ask your care team if ongoing lung cancer screenings are right for you.  For informational purposes only. Not to replace the advice of your health care provider. Copyright   2023 [facility]. All rights reserved. Clinically reviewed by the [facility] Transitions Program. [employer] app 809067 - REV 01/24.  Preventing Falls: Care Instructions  Injuries and health problems such as trouble walking or poor eyesight can increase your risk of falling. So can some medicines. But there are things you can do to help  "prevent falls. You can exercise to get stronger. You can also arrange your home to make it safer.    Talk to your doctor about the medicines you take. Ask if any of them increase the risk of falls and whether they can be changed or stopped.   Try to exercise regularly. It can help improve your strength and balance. This can help lower your risk of falling.     Practice fall safety and prevention.    Wear low-heeled shoes that fit well and give your feet good support. Talk to your doctor if you have foot problems that make this hard.  Carry a cellphone or wear a medical alert device that you can use to call for help.  Use stepladders instead of chairs to reach high objects. Don't climb if you're at risk for falls. Ask for help, if needed.  Wear the correct eyeglasses, if you need them.    Make your home safer.    Remove rugs, cords, clutter, and furniture from walkways.  Keep your house well lit. Use night-lights in hallways and bathrooms.  Install and use sturdy handrails on stairways.  Wear nonskid footwear, even inside. Don't walk barefoot or in socks without shoes.    Be safe outside.    Use handrails, curb cuts, and ramps whenever possible.  Keep your hands free by using a shoulder bag or backpack.  Try to walk in well-lit areas. Watch out for uneven ground, changes in pavement, and debris.  Be careful in the winter. Walk on the grass or gravel when sidewalks are slippery. Use de-icer on steps and walkways. Add non-slip devices to shoes.    Put grab bars and nonskid mats in your shower or tub and near the toilet. Try to use a shower chair or bath bench when bathing.   Get into a tub or shower by putting in your weaker leg first. Get out with your strong side first. Have a phone or medical alert device in the bathroom with you.   Where can you learn more?  Go to https://www.[employer].net/patiented  Enter G117 in the search box to learn more about \"Preventing Falls: Care Instructions.\"  Current as of: July 17, " 2023               Content Version: 14.0    9201-8565 [employer].   Care instructions adapted under license by your healthcare professional. If you have questions about a medical condition or this instruction, always ask your healthcare professional. [employer] disclaims any warranty or liability for your use of this information.

## 2024-08-14 ENCOUNTER — TELEPHONE (OUTPATIENT)
Dept: FAMILY MEDICINE | Facility: CLINIC | Age: 79
End: 2024-08-14
Payer: COMMERCIAL

## 2024-08-14 LAB
ALBUMIN SERPL BCG-MCNC: 3.9 G/DL (ref 3.5–5.2)
ALP SERPL-CCNC: 72 U/L (ref 40–150)
ALT SERPL W P-5'-P-CCNC: 15 U/L (ref 0–50)
ANION GAP SERPL CALCULATED.3IONS-SCNC: 9 MMOL/L (ref 7–15)
AST SERPL W P-5'-P-CCNC: 27 U/L (ref 0–45)
BILIRUB SERPL-MCNC: 0.4 MG/DL
BUN SERPL-MCNC: 14.7 MG/DL (ref 8–23)
CALCIUM SERPL-MCNC: 9.6 MG/DL (ref 8.8–10.4)
CHLORIDE SERPL-SCNC: 100 MMOL/L (ref 98–107)
CREAT SERPL-MCNC: 0.86 MG/DL (ref 0.51–0.95)
EGFRCR SERPLBLD CKD-EPI 2021: 69 ML/MIN/1.73M2
GLUCOSE SERPL-MCNC: 88 MG/DL (ref 70–99)
HCO3 SERPL-SCNC: 29 MMOL/L (ref 22–29)
POTASSIUM SERPL-SCNC: 3.9 MMOL/L (ref 3.4–5.3)
PROT SERPL-MCNC: 7.2 G/DL (ref 6.4–8.3)
SODIUM SERPL-SCNC: 138 MMOL/L (ref 135–145)
TSH SERPL DL<=0.005 MIU/L-ACNC: 2.78 UIU/ML (ref 0.3–4.2)

## 2024-08-14 NOTE — TELEPHONE ENCOUNTER
Linnea with [facility] calling to let provider/team know that they have to decline the HC referral as they are not contracted with patient's insurance.  Will need to find alternate HC agency that accepts patient's insurance    Criss Olguin RN  [facility]

## 2024-08-14 NOTE — TELEPHONE ENCOUNTER
[place] and [place] [facility]    They will check to see if they are in network for patients insurance.    Face sheet was faxed to their location for review.  Fax number [phone].    They will call back and let us know.    Christine M Klisch, RN

## 2024-08-15 PROBLEM — M17.0 PRIMARY OSTEOARTHRITIS OF BOTH KNEES: Status: ACTIVE | Noted: 2024-08-15

## 2024-08-15 NOTE — TELEPHONE ENCOUNTER
RN left VM for Corrine requesting call back to clinic.    RN called to give verbal orders.    VM was not identified as confidential.    Jude Fowler RN, BSN, PHN  [facility]

## 2024-08-15 NOTE — TELEPHONE ENCOUNTER
Corrine returned call back    Reviewed initially order request and provider's approval     Carlene RN    Triage Nurse  [facility]

## 2024-08-15 NOTE — TELEPHONE ENCOUNTER
Corrine from [facility] calling and can be reached at [phone] to inform our office that they can accept pt for homecare.      Could start her for SN on Sunday then PT would be on 8/23. Would be a delay for OT by 3 weeks.       Is it ok if she has OT delay? They do not have orders, they have face sheet. If Provider decides to use [facility] would need verbal orders to start Skilled Nursing on 8/18. Would need most recent visit notes discussing what she needs home care for and med list. Please fax to [phone].     Routing to [place] nursing team as FYI, Provider to get verbal ok for orders, and team to fax information above.      Allyson Mcpherson RN  [facility]

## 2024-08-19 ENCOUNTER — TELEPHONE (OUTPATIENT)
Dept: FAMILY MEDICINE | Facility: CLINIC | Age: 79
End: 2024-08-19
Payer: COMMERCIAL

## 2024-08-19 NOTE — TELEPHONE ENCOUNTER
Forms received from [facility]/ Physician Order #[id number] (order date: 8/15/24) for Hazel Ayala MD.  Forms placed in provider 'sign me' folder.  Please fax forms to [phone] after completion.    Erick ZEPEDA (R) (ARRT)  Radiology Dept

## 2024-08-19 NOTE — TELEPHONE ENCOUNTER
Forms received from [facility]/ Physician Order #[id number] (order date: 8/16/24) for Hazel Ayala MD.  Forms placed in provider 'sign me' folder.  Please fax forms to [phone] after completion.    Erick ZEPEDA (R) (ARRT)  Radiology Dept

## 2024-09-10 ENCOUNTER — TELEPHONE (OUTPATIENT)
Dept: FAMILY MEDICINE | Facility: CLINIC | Age: 79
End: 2024-09-10
Payer: COMMERCIAL

## 2024-09-16 ENCOUNTER — TELEPHONE (OUTPATIENT)
Dept: FAMILY MEDICINE | Facility: CLINIC | Age: 79
End: 2024-09-16

## 2024-09-16 NOTE — TELEPHONE ENCOUNTER
Routing to PCP.  SAMSON Carey with [facility] is calling to let Dr Ayala know that patient is declining PT, OT and speech therapy.  Patient and daughter is wanting to postpone services until October.  They will need to obtain a new referral once they are ready for services.  Nursing will discharge this week.    EULALIO Pickering  [facility]

## 2024-09-18 ENCOUNTER — TELEPHONE (OUTPATIENT)
Dept: FAMILY MEDICINE | Facility: CLINIC | Age: 79
End: 2024-09-18
Payer: COMMERCIAL

## 2024-09-18 NOTE — TELEPHONE ENCOUNTER
Forms received from [facility]/ Home Health Cert and Plan of Care (order number [id number]) (cert period 09/10/24 - 11/08/24) for Savannah Irving MD.  Forms placed in provider 'sign me' folder.  Please fax forms to [phone] after completion.    EULALIO Pickering  [facility]

## 2024-09-23 ENCOUNTER — TELEPHONE (OUTPATIENT)
Dept: FAMILY MEDICINE | Facility: CLINIC | Age: 79
End: 2024-09-23
Payer: COMMERCIAL

## 2024-09-23 NOTE — TELEPHONE ENCOUNTER
Forms received from [facility]/ Discharge from agency - Order #[id number] (order date: 9/20/2024) for Hazel Ayala MD.  Forms placed in provider 'sign me' folder.  Please fax forms to [phone] after completion.    Erick ZEPEDA (R) (ARRT)  Radiology Dept

## 2024-09-25 ENCOUNTER — TELEPHONE (OUTPATIENT)
Dept: FAMILY MEDICINE | Facility: CLINIC | Age: 79
End: 2024-09-25
Payer: COMMERCIAL

## 2024-09-25 NOTE — TELEPHONE ENCOUNTER
Forms received from [facility]/ Discharge from Agency (order number [id number]) (order date 09/24/24) for Savannah Irving MD.  Forms placed in provider 'sign me' folder.  Please fax forms to [phone] after completion.    EULALIO Pickering  [facility]

## 2024-10-03 ENCOUNTER — OFFICE VISIT (OUTPATIENT)
Dept: FAMILY MEDICINE | Facility: CLINIC | Age: 79
End: 2024-10-03
Payer: COMMERCIAL

## 2024-10-03 VITALS
OXYGEN SATURATION: 98 % | RESPIRATION RATE: 20 BRPM | HEART RATE: 60 BPM | TEMPERATURE: 97.7 F | DIASTOLIC BLOOD PRESSURE: 68 MMHG | SYSTOLIC BLOOD PRESSURE: 134 MMHG | BODY MASS INDEX: 27.09 KG/M2 | WEIGHT: 147.2 LBS | HEIGHT: 62 IN

## 2024-10-03 DIAGNOSIS — F02.80 ALZHEIMER'S DISEASE (H): Primary | ICD-10-CM

## 2024-10-03 DIAGNOSIS — M17.0 PRIMARY OSTEOARTHRITIS OF BOTH KNEES: ICD-10-CM

## 2024-10-03 DIAGNOSIS — G30.9 ALZHEIMER'S DISEASE (H): Primary | ICD-10-CM

## 2024-10-03 PROCEDURE — 99214 OFFICE O/P EST MOD 30 MIN: CPT | Performed by: FAMILY MEDICINE

## 2024-10-03 NOTE — PROGRESS NOTES
"  Assessment & Plan     (G30.9,  F02.80) Alzheimer's disease (H)  (primary encounter diagnosis)  Comment: lives with oldest daughter.  Plan: will restart home care.  Also recommended considering looking into a senior  for her.  And ask home care about assistance with getting additional foot rest for wheelchair    (M17.0) Primary osteoarthritis of both knees  Comment: previously  had xrays and injections in [place] before moving to [place].   Plan: Orthopedic  Referral        Referral provided for repeat injection consideration            FUTURE APPOINTMENTS:       - Follow-up visit in 3-4 months    Yesi Dumont is a 78 year old, presenting for the following health issues:  RECHECK        10/3/2024     2:54 PM   Additional Questions   Roomed by Katelyn WHITMAN MA   Accompanied by Daughter Buffy     History of Present Illness       Reason for visit:  Follow up to last visit    She eats 2-3 servings of fruits and vegetables daily.She consumes 2 sweetened beverage(s) daily.She exercises with enough effort to increase her heart rate 30 to 60 minutes per day.  She exercises with enough effort to increase her heart rate 3 or less days per week.   She is taking medications regularly.     Redo Handicap application, misplaced the last one.     Knee injections and discuss medical marijuana  Due for injections - she believes.  Last injection - they believe - was in May before she moved back to [place]  Xrays from [place]    New wheelchair order.   Has a wheelchair from her Dad but only one foot rest works.    Will restart home care    Still having down days and days where she is not down.      Review of Systems  Constitutional, HEENT, cardiovascular, pulmonary, gi and gu systems are negative, except as otherwise noted.      Objective    /68 (BP Location: Right arm, Patient Position: Chair, Cuff Size: Adult Regular)   Pulse 60   Temp 97.7  F (36.5  C) (Oral)   Resp 20   Ht 1.577 m (5' 2.09\")   Wt 66.8 kg (147 lb 3.2 oz) "   LMP  (LMP Unknown)   SpO2 98%   Breastfeeding No   BMI 26.85 kg/m    Body mass index is 26.85 kg/m .  Physical Exam   GENERAL: alert and no distress  PSYCH: mentation appears normal, affect normal/bright            Signed Electronically by: Savannah Irving MD

## 2024-10-04 ENCOUNTER — PATIENT OUTREACH (OUTPATIENT)
Dept: CARE COORDINATION | Facility: CLINIC | Age: 79
End: 2024-10-04
Payer: COMMERCIAL

## 2024-10-07 ENCOUNTER — PATIENT OUTREACH (OUTPATIENT)
Dept: CARE COORDINATION | Facility: CLINIC | Age: 79
End: 2024-10-07
Payer: COMMERCIAL

## 2024-10-09 ENCOUNTER — TELEPHONE (OUTPATIENT)
Dept: FAMILY MEDICINE | Facility: CLINIC | Age: 79
End: 2024-10-09
Payer: COMMERCIAL

## 2024-10-09 NOTE — TELEPHONE ENCOUNTER
Home Care is calling regarding an established patient with [facility].       Requesting orders from: Savannah Ayala  Provider is following patient: No       Orders Requested    Speech Therapy  Request for initial certification (first set of orders)   Frequency:  2x/wk for 4 wks  then 1x/wk for 2 wks  Cognition and family training       Information was gathered and will be sent to provider for review.  RN will contact Home Care with information after provider review.  Information was gathered and will be sent to provider to confirm provider will be following patient.  RN will contact Home Care with information after provider review.  Confirmed ok to leave a detailed message with call back.  Contact information confirmed and updated as needed.    Ivette Farnsworth RN

## 2024-10-09 NOTE — TELEPHONE ENCOUNTER
Forms received from [facility]/ Physician Order (order number [id number]) for Savannah Irving MD.  Forms placed in provider 'sign me' folder.  Please fax forms to [phone] after completion.    EULALIO Pickering  [facility]

## 2024-10-10 NOTE — TELEPHONE ENCOUNTER
[facility] called [facility], and gave provider's message below in DVM    RN left clinic call back number for further questions    Kenyatta Zaldivar RN

## 2024-10-10 NOTE — TELEPHONE ENCOUNTER
I do not believe she needs speech therapy.    The home care order I sent included, skilled nursing, PT, OT, SW

## 2024-10-14 NOTE — MR AVS SNAPSHOT
After Visit Summary   5/15/2017    Julia Tabares    MRN: [id number]           Patient Information     Date Of Birth          1945        Visit Information        Provider Department      5/15/2017 2:00 PM Yvonne Marie MD [facility]        Today's Diagnoses     Carpal tunnel syndrome of left wrist    -  1    Primary osteoarthritis of both first carpometacarpal joints          Care Instructions    Thanks for coming today.  Ortho/Sports Medicine Clinic  [address] [place] [address]    To schedule future appointments in Ortho Clinic, you may call [phone].    To schedule ordered imaging by your provider:   Call Central Imaging [phone]    To schedule an injection ordered by your provider:  Call Central Imaging Injection scheduling line: [phone]  MyChart available online at:  [email or web address]    Please call if any further questions or concerns ([phone]).  Clinic hours 8 am to 5 pm.    Return to clinic (call) if symptoms worsen or fail to improve.            Follow-ups after your visit        Your next 10 appointments already scheduled     2017  2:00 PM CDT   SHORT with Shanna Rodriguez MD   [facility]    [address] [place] [id number] [phone]           Your Arrival times is X, We need you to be here at this time to get checked in and have the assistant get you ready for the provider, which can take about 15 minutes. Your appointment time with your provider is at  X. Thank you.              Who to contact     If you have questions or need follow up information about today's clinic visit or your schedule please contact [facility] directly at [phone].  Normal or non-critical lab and imaging results will be communicated to you by MyChart, letter or phone within 4 business days after the clinic has received the  LOV: 02/19/24  Last Filled: 08/14/24   "results. If you do not hear from us within 7 days, please contact the clinic through [employer] or phone. If you have a critical or abnormal lab result, we will notify you by phone as soon as possible.  Submit refill requests through [employer] or call your pharmacy and they will forward the refill request to us. Please allow 3 business days for your refill to be completed.          Additional Information About Your Visit        [employer] Information     [employer] gives you secure access to your electronic health record. If you see a primary care provider, you can also send messages to your care team and make appointments. If you have questions, please call your primary care clinic.  If you do not have a primary care provider, please call [phone] and they will assist you.      [employer] is an electronic gateway that provides easy, online access to your medical records. With [employer], you can request a clinic appointment, read your test results, renew a prescription or communicate with your care team.     To access your existing account, please contact your [facility] or call [phone] for assistance.        Care EveryWhere ID     This is your Care EveryWhere ID. This could be used by other organizations to access your [facility] medical records  [id number]        Your Vitals Were     Pulse Height Pulse Oximetry BMI (Body Mass Index)          63 1.613 m (5' 3.5\") 98% 25.81 kg/m2         Blood Pressure from Last 3 Encounters:   05/15/17 136/70   05/12/17 120/75   04/21/17 118/78    Weight from Last 3 Encounters:   05/15/17 67.1 kg (148 lb)   05/12/17 68.5 kg (151 lb)   04/21/17 68.5 kg (151 lb)              We Performed the Following     DEPO MEDROL 40 MG     DRAIN/INJECT SMALL JOINT/BURSA        Primary Care Provider Office Phone # Fax #    Shanna Rodriguez [phone]       [address] [address]        Thank you!     " Thank you for choosing [facility]  for your care. Our goal is always to provide you with excellent care. Hearing back from our patients is one way we can continue to improve our services. Please take a few minutes to complete the written survey that you may receive in the mail after your visit with us. Thank you!             Your Updated Medication List - Protect others around you: Learn how to safely use, store and throw away your medicines at www.disposemymeds.org.          This list is accurate as of: 5/15/17 11:59 PM.  Always use your most recent med list.                   Brand Name Dispense Instructions for use    ASACOL 400 MG EC tablet   Generic drug:  mesalamine      4 TABLETS DAILY       benzoyl peroxide 5 % topical gel     60 g    Apply topically daily as needed       CALCIUM + D PO      Take  by mouth. ONCE DAILY.       citalopram 20 MG tablet    celeXA    90 tablet    Take 0.5-1 tablets (10-20 mg) by mouth daily       clotrimazole 1 % cream    LOTRIMIN    60 g    Apply topically 2 times daily       diclofenac 1 % Gel topical gel    VOLTAREN    100 g    Apply 4 grams to knees or 2 grams to hands four times daily using enclosed dosing card.       hydrochlorothiazide 25 MG tablet    HYDRODIURIL    90 tablet    Take 1 tablet (25 mg) by mouth daily       MULTIVITAMIN PO      Take  by mouth. ONCE DAILY.       triamcinolone 0.1 % cream    KENALOG    30 g    Apply topically 2 times daily       * UNABLE TO FIND      MEDICATION NAME: Cur Man Vitamin       * UNABLE TO FIND      MEDICATION NAME: Collagen vitamin       * Notice:  This list has 2 medication(s) that are the same as other medications prescribed for you. Read the directions carefully, and ask your doctor or other care provider to review them with you.

## 2024-10-22 ENCOUNTER — TELEPHONE (OUTPATIENT)
Dept: FAMILY MEDICINE | Facility: CLINIC | Age: 79
End: 2024-10-22
Payer: COMMERCIAL

## 2024-10-22 DIAGNOSIS — Z53.9 DIAGNOSIS NOT YET DEFINED: Primary | ICD-10-CM

## 2024-10-22 PROCEDURE — G0180 MD CERTIFICATION HHA PATIENT: HCPCS | Performed by: FAMILY MEDICINE

## 2024-10-22 NOTE — TELEPHONE ENCOUNTER
Routing to Dr Ayala  to give clarification to patient home care plan of care.    Regarding medication review and reconciliation    Speech therapy will review medication list and medications in home.    They find that often times when discharged from hospital or TCU, there are medications from different providers.    If they find discrepancies, they reach out provider for clarification so PCP list and home list match    It is not often, but speech can be the sole discipline seeing patient.  If there is medication education or new medications are added, the request skilled nursing to come out and complete the education.    Regarding Depression and pain management    PHQ 9 and pain assessment are completed at start of care and re certification.  At each visit, patient is asked if there have been any changes to depression/anxiety.  If changes are noted, they notify PCP.  They do review ways to manage pain and if pain is 8 or greater, advise patient to go to ER.          RN spoke with [facility] Speech Therapist from [facility] regarding above.    Jude Fowler, RN, BSN, PHN  [facility]

## 2024-10-22 NOTE — TELEPHONE ENCOUNTER
Forms received from [facility]/ Physician Order (order number [id number], [id number])  for Savannah Irving MD.  Forms placed in provider 'sign me' folder.  Please fax forms to [phone] after completion.    Erick June RT (R) (ARRT)  Radiology Dept

## 2024-10-24 ENCOUNTER — TELEPHONE (OUTPATIENT)
Dept: FAMILY MEDICINE | Facility: CLINIC | Age: 79
End: 2024-10-24
Payer: COMMERCIAL

## 2024-10-24 NOTE — TELEPHONE ENCOUNTER
Forms received from [employer]/ [employer] (order number [id number]) for Savannah Irving MD.  Forms placed in provider 'sign me' folder.  Please fax forms to [phone] after completion.    EULALIO Pickering  [facility]

## 2024-10-28 ENCOUNTER — TELEPHONE (OUTPATIENT)
Dept: FAMILY MEDICINE | Facility: CLINIC | Age: 79
End: 2024-10-28
Payer: COMMERCIAL

## 2024-10-28 NOTE — TELEPHONE ENCOUNTER
Forms received from  [facility] and Plan of Care - Order # [id number] (SOC date: 10/9/24) for Savannah Irving MD.  Forms placed in provider 'sign me' folder.  Please fax forms to [phone] after completion.    Erick ZEPDEA (R) (ARRT)  Radiology Dept

## 2024-11-04 NOTE — TELEPHONE ENCOUNTER
I will sign orders when I am in clinic tomorrow.    But I would recommend that Med reconciliation occur in conjunction with the Home Care RN first.  Also, pain concerns, should first be reviewed the the Home Care RN as well.    Thanks

## 2024-11-06 ENCOUNTER — OFFICE VISIT (OUTPATIENT)
Dept: ORTHOPEDICS | Facility: CLINIC | Age: 79
End: 2024-11-06
Attending: FAMILY MEDICINE
Payer: COMMERCIAL

## 2024-11-06 ENCOUNTER — ANCILLARY PROCEDURE (OUTPATIENT)
Dept: GENERAL RADIOLOGY | Facility: CLINIC | Age: 79
End: 2024-11-06
Attending: STUDENT IN AN ORGANIZED HEALTH CARE EDUCATION/TRAINING PROGRAM
Payer: COMMERCIAL

## 2024-11-06 VITALS — BODY MASS INDEX: 27.05 KG/M2 | HEIGHT: 62 IN | WEIGHT: 147 LBS

## 2024-11-06 DIAGNOSIS — M17.0 PRIMARY OSTEOARTHRITIS OF BOTH KNEES: ICD-10-CM

## 2024-11-06 PROCEDURE — 20610 DRAIN/INJ JOINT/BURSA W/O US: CPT | Mod: 50 | Performed by: STUDENT IN AN ORGANIZED HEALTH CARE EDUCATION/TRAINING PROGRAM

## 2024-11-06 PROCEDURE — 73562 X-RAY EXAM OF KNEE 3: CPT | Mod: TC | Performed by: STUDENT IN AN ORGANIZED HEALTH CARE EDUCATION/TRAINING PROGRAM

## 2024-11-06 PROCEDURE — 99203 OFFICE O/P NEW LOW 30 MIN: CPT | Mod: 25 | Performed by: STUDENT IN AN ORGANIZED HEALTH CARE EDUCATION/TRAINING PROGRAM

## 2024-11-06 RX ORDER — ROPIVACAINE HYDROCHLORIDE 5 MG/ML
4 INJECTION, SOLUTION EPIDURAL; INFILTRATION; PERINEURAL
Status: COMPLETED | OUTPATIENT
Start: 2024-11-06 | End: 2024-11-06

## 2024-11-06 RX ORDER — BETAMETHASONE SODIUM PHOSPHATE AND BETAMETHASONE ACETATE 3; 3 MG/ML; MG/ML
6 INJECTION, SUSPENSION INTRA-ARTICULAR; INTRALESIONAL; INTRAMUSCULAR; SOFT TISSUE
Status: COMPLETED | OUTPATIENT
Start: 2024-11-06 | End: 2024-11-06

## 2024-11-06 RX ADMIN — BETAMETHASONE SODIUM PHOSPHATE AND BETAMETHASONE ACETATE 6 MG: 3; 3 INJECTION, SUSPENSION INTRA-ARTICULAR; INTRALESIONAL; INTRAMUSCULAR; SOFT TISSUE at 14:30

## 2024-11-06 RX ADMIN — ROPIVACAINE HYDROCHLORIDE 4 ML: 5 INJECTION, SOLUTION EPIDURAL; INFILTRATION; PERINEURAL at 14:30

## 2024-11-06 NOTE — LETTER
11/6/2024      Julia Tabares  [address] [address] [facility] [place] [address]      Dear Colleague,    Thank you for referring your patient, Julia Tabares, to the [facility]. Please see a copy of my visit note below.    ASSESSMENT & PLAN    Julia was seen today for pain and pain.    Diagnoses and all orders for this visit:    Primary osteoarthritis of both knees  -     Orthopedic  Referral  -     XR Knee Bilateral 3 Views; Future  -     Large Joint Injection/Arthocentesis: bilateral knee      This issue is acute on chronic and Worsening.    # Bilateral knee pain: Julia Tabares  was seen today for bilateral knee pain. Symptoms had been going on for a number of years. On examination there are positive findings of joint line tenderness, medial soft tissue swelling. Imaging findings showed severe osteoarthritis, worst in the medial compartment. Likely cause of patient's condition due to osteoarthritis.  Counseled patient on nature of condition and treatment options.    - Treatment: Activities as tolerated.  - Medications/Injections: Naproxen twice daily or Topical Voltaren gel three times daily for pain for 2-4 weeks. Okay to take tylenol three times daily as needed in addition to these.    Follow-up: In 4-6 weeks if symptoms do not improve, sooner if worsening  Can consider medial  brace, PT or ortho referral, though is likely not a good surgical candidate given Alzheimer;s diagnosis    Tramaine Campos MD  [facility]    -----  Chief Complaint   Patient presents with     Right Knee - Pain     Left Knee - Pain       SUBJECTIVE  Julia Tabares is a/an 79 year old female who is seen in consultation at the request of  Savannah Ayala M.D. for evaluation of bilateral knees.     The patient is seen with their daughter.    Onset: chronic. Reports insidious onset without acute precipitating event.  Location of Pain:  "bilateral anteromedial knees  Worsened by: stairs, incline/decline walking, prolonged walking  Better with: steroid injections (most recently in May 2024 in [place] - 4-5 months of relief)  Treatments tried: corticosteroid injection (most recent date: May 2024) that provided  5 month(s) of relief and viscosupplementation injection (most recent date: November 2017)  Associated symptoms: swelling    Orthopedic/Surgical history: YES - right knee MRI performed 12/27/2021; has previously seen Dr. Solorzano, Dr. Chua, Dr. 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  CV: distal perfusion intact   Resp: normal respiratory effort without conversational dyspnea   Psych: normal mood and affect  Gait: antalgic  Neuro: Normal light sensory exam of bilateral lower extremities    BILATERAL KNEE  Inspection:    Chronic soft tissue swelling of the medial knees  Palpation:    Tender about the lateral joint line and medial joint line. Remainder of bony and ligamentous landmarks are nontender.    Trace effusion is present    Patellofemoral crepitus is Present  Range of Motion:     00 extension to 1200 flexion  Strength:    5/5 flexion and extension    Extensor mechanism intact  Special Tests:    Positive: Nia's    Negative: MCL/valgus stress (0 & 30 deg), LCL/varus stress (0 & 30 deg), Lachman's       RADIOLOGY:  Final results and radiologist's interpretation, available in the [facility] health record.  Images were reviewed with the patient in the office today.  My personal interpretation of the performed imaging:   severe osteoarthritis, worst in the medial compartment.      Large Joint Injection/Arthocentesis: bilateral " knee    Date/Time: 11/6/2024 2:30 PM    Performed by: Tramaine Campos MD  Authorized by: Tramaine Campos MD    Indications:  Pain and osteoarthritis  Needle Size:  22 G  Guidance: landmark guided    Approach:  Anteromedial  Location:  Knee  Laterality:  Bilateral      Medications (Right):  6 mg betamethasone acet & sod phos 6 (3-3) MG/ML; 4 mL ROPivacaine 5 MG/ML  Medications (Left):  6 mg betamethasone acet & sod phos 6 (3-3) MG/ML; 4 mL ROPivacaine 5 MG/ML  Outcome:  Tolerated well, no immediate complications  Procedure discussed: discussed risks, benefits, and alternatives    Consent Given by:  Patient  Timeout: timeout called immediately prior to procedure    Prep: patient was prepped and draped in usual sterile fashion     Patient tolerated bilateral knee steroid injection. Aftercare instructions given to patient. Plan to follow-up as above.     Tramaine Campos MD          Patient was independently assessed by me and was deemed appropriate for this procedure. Risks and benefits were discussed with good understanding including, but not limited to, the risks of bleeding, reaction to the medication, infection, temporary elevation of blood sugars, worsening or rapid acceleration of arthritis, and the possibility of the treatment being ineffective. The patient tolerated the procedure well with no complications.    After visit care instructions were discussed in person and provided in AVS.           Again, thank you for allowing me to participate in the care of your patient.        Sincerely,        Tramaine Campos MD

## 2024-11-06 NOTE — PROGRESS NOTES
ASSESSMENT & PLAN    Julia was seen today for pain and pain.    Diagnoses and all orders for this visit:    Primary osteoarthritis of both knees  -     Orthopedic  Referral  -     XR Knee Bilateral 3 Views; Future  -     Large Joint Injection/Arthocentesis: bilateral knee      This issue is acute on chronic and Worsening.    # Bilateral knee pain: Julia Tabares  was seen today for bilateral knee pain. Symptoms had been going on for a number of years. On examination there are positive findings of joint line tenderness, medial soft tissue swelling. Imaging findings showed severe osteoarthritis, worst in the medial compartment. Likely cause of patient's condition due to osteoarthritis.  Counseled patient on nature of condition and treatment options.    - Treatment: Activities as tolerated.  - Medications/Injections: Naproxen twice daily or Topical Voltaren gel three times daily for pain for 2-4 weeks. Okay to take tylenol three times daily as needed in addition to these.    Follow-up: In 4-6 weeks if symptoms do not improve, sooner if worsening  Can consider medial  brace, PT or ortho referral, though is likely not a good surgical candidate given Alzheimer;s diagnosis    Tramaine Campos MD  [facility]    -----  Chief Complaint   Patient presents with    Right Knee - Pain    Left Knee - Pain       SUBJECTIVE  Julia Tabares is a/an 79 year old female who is seen in consultation at the request of  Savannah Ayala M.D. for evaluation of bilateral knees.     The patient is seen with their daughter.    Onset: chronic. Reports insidious onset without acute precipitating event.  Location of Pain: bilateral anteromedial knees  Worsened by: stairs, incline/decline walking, prolonged walking  Better with: steroid injections (most recently in May 2024 in [place] - 4-5 months of relief)  Treatments tried: corticosteroid injection (most recent date: May  "2024) that provided  5 month(s) of relief and viscosupplementation injection (most recent date: November 2017)  Associated symptoms: swelling    Orthopedic/Surgical history: YES - right knee MRI performed 12/27/2021; has previously seen Dr. Solorzano, Dr. Chua, Dr. 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  CV: distal perfusion intact   Resp: normal respiratory effort without conversational dyspnea   Psych: normal mood and affect  Gait: antalgic  Neuro: Normal light sensory exam of bilateral lower extremities    BILATERAL KNEE  Inspection:    Chronic soft tissue swelling of the medial knees  Palpation:    Tender about the lateral joint line and medial joint line. Remainder of bony and ligamentous landmarks are nontender.    Trace effusion is present    Patellofemoral crepitus is Present  Range of Motion:     00 extension to 1200 flexion  Strength:    5/5 flexion and extension    Extensor mechanism intact  Special Tests:    Positive: Nia's    Negative: MCL/valgus stress (0 & 30 deg), LCL/varus stress (0 & 30 deg), Lachman's       RADIOLOGY:  Final results and radiologist's interpretation, available in the [facility] health record.  Images were reviewed with the patient in the office today.  My personal interpretation of the performed imaging:   severe osteoarthritis, worst in the medial compartment.      Large Joint Injection/Arthocentesis: bilateral knee    Date/Time: 11/6/2024 2:30 PM    Performed by: Tramaine Campos MD  Authorized by: Tramaine Campos MD    Indications:  Pain and osteoarthritis  Needle Size:  22 G  Guidance: landmark guided    Approach:  Anteromedial  Location:  Knee  Laterality:  " Bilateral      Medications (Right):  6 mg betamethasone acet & sod phos 6 (3-3) MG/ML; 4 mL ROPivacaine 5 MG/ML  Medications (Left):  6 mg betamethasone acet & sod phos 6 (3-3) MG/ML; 4 mL ROPivacaine 5 MG/ML  Outcome:  Tolerated well, no immediate complications  Procedure discussed: discussed risks, benefits, and alternatives    Consent Given by:  Patient  Timeout: timeout called immediately prior to procedure    Prep: patient was prepped and draped in usual sterile fashion     Patient tolerated bilateral knee steroid injection. Aftercare instructions given to patient. Plan to follow-up as above.     Tramaine Campos MD          Patient was independently assessed by me and was deemed appropriate for this procedure. Risks and benefits were discussed with good understanding including, but not limited to, the risks of bleeding, reaction to the medication, infection, temporary elevation of blood sugars, worsening or rapid acceleration of arthritis, and the possibility of the treatment being ineffective. The patient tolerated the procedure well with no complications.    After visit care instructions were discussed in person and provided in AVS.

## 2024-11-06 NOTE — PATIENT INSTRUCTIONS
[facility] Injection Discharge Instructions    Procedure: bilateral knee cortisone injections    You may shower, however avoid swimming, tub baths or hot tubs for 24 hours following your procedure  You may have a mild to moderate increase in pain for several days following the injection.  It may take up to 14 days for the steroid medication to start working although you may feel the effect as early as a few days after the procedure.  You may use ice packs for 10-15 minutes, 3 to 4 times a day at the injection site for comfort  You may use anti-inflammatory medications (such as Ibuprofen or Aleve or Advil) or Tylenol for pain control if necessary and allowed to by your regular doctor  If you were fasting, you may resume your normal diet and medications after the procedure  If you have diabetes, check your blood sugar more frequently than usual as your blood sugar may be higher than normal for 10-14 days following a steroid injection. Contact your doctor who manages your diabetes if your blood sugar is higher than usual    If you experience any of the following, call [facility] @ [phone] or [phone]  - Fever over 100 degree F  - Swelling, bleeding, redness, drainage, warmth at the injection site  - New or worsening pain

## 2024-11-06 NOTE — TELEPHONE ENCOUNTER
RN left  for Tree requesting call back to clinic    RN called to review Dr Flores recommendations.    When Tree call back, please advise Dr Ayala recommends medication review, pain and depression assessment should be done in conjunction with RN.    RN reviewed orders signed by Dr Ayala on 11/5.      Dr Ayala noted above should be done in conjunction with RN.    Jude Fowler, RN, BSN, PHN  [facility]

## 2024-11-07 ENCOUNTER — TELEPHONE (OUTPATIENT)
Dept: FAMILY MEDICINE | Facility: CLINIC | Age: 79
End: 2024-11-07
Payer: COMMERCIAL

## 2024-11-07 NOTE — TELEPHONE ENCOUNTER
Routing to Dr Ayala.    RN gave verbal order for Skilled nursing Eval.    Per Maria G, Family had declined nursing services.  Family manages medications.    Per Maria G, staff messaging is a preferred for of communication for many providers    Would you prefer Home care to call RN to discuss order/concerns VS. Staff message?    Patient was baseline for physical therapy and sees orthopedic.    Patient seen by speech due to word finding difficulties due to alzheimers    [facility] does not have  but would refer out to senior linkage.    Patient had left for vacation with family so patient was D/Edwar from Home care.  Home care was resumed when patient returned.      RN spoke with Maria G at [facility] regarding the above.    Jude Fowler, RN, BSN, PHN  [facility]

## 2024-11-07 NOTE — TELEPHONE ENCOUNTER
Yes, I prefer communication through the usual routes.  I am not familiar with home care having EPIC access.

## 2024-11-07 NOTE — TELEPHONE ENCOUNTER
I received this message through staff messaging:  Maria G Holbrook sent to Savannah Ayala MD  Good Afternoon!  We received the 485 back, signed. Thank you! I am reaching out in clarification to the notations made on the order itself, in addition to the correspondence on EPIC regarding medication reconciliation and pain in conjunction with an RN.  Would you like to order an RN eval?    Can you please find out why I am being messaged through staff message by a Home Care agency?  Instead of the usual route of calling the clinic and speaking with an RN.    And then also, the original order for Home Care included skilled nursing, physical therapy, and Occupational therapy, as well as social work.

## 2024-11-07 NOTE — TELEPHONE ENCOUNTER
Forms received from [facility]/ Physician Order # [id number] (order date: 11/7/24) for Savannah Irving MD.  Forms placed in provider 'sign me' folder.  Please fax forms to [phone] after completion.    Erick ZEPEDA (R) (ARRT)  Radiology Dept

## 2024-11-07 NOTE — TELEPHONE ENCOUNTER
Huddled with Kenyatta and she said to send back to nurse [place].    EULALIO Pickering  [facility]

## 2024-11-07 NOTE — TELEPHONE ENCOUNTER
Routing to TEAM    Please call [facility] home health care - please clarify if they need a new home care referral for skilled nursing or if verbal order would be ok.    Then route back to RN team.    Kenyatta Zaldivar RN

## 2024-11-08 NOTE — TELEPHONE ENCOUNTER
Please see TE  from 11/7.    RN spoke with Maria G Holbrook at [facility].    Closing encounter.    Jude Fowler RN, BSN, PHN  [facility]

## 2024-11-08 NOTE — TELEPHONE ENCOUNTER
RN called and left VM for Maria G advising Dr Ayala preferred form of communication is thru calling the clinic and asking to speak to te RN team    RN advised to call back with any questions.    Jude Fowler RN, BSN, PHN  [facility]

## 2024-11-11 ENCOUNTER — TELEPHONE (OUTPATIENT)
Dept: FAMILY MEDICINE | Facility: CLINIC | Age: 79
End: 2024-11-11

## 2024-11-11 NOTE — TELEPHONE ENCOUNTER
Receive call from GREGORY Mc with [facility]. She is calling with an update. Patient declined the need for skilled nursing today and is declining future visits. She is accepting of both PT and speech however. No VO needed- FYI only.     SARA Aparicio RN  [facility]

## 2024-11-19 ENCOUNTER — TELEPHONE (OUTPATIENT)
Dept: FAMILY MEDICINE | Facility: CLINIC | Age: 79
End: 2024-11-19
Payer: COMMERCIAL

## 2024-11-19 NOTE — TELEPHONE ENCOUNTER
Home Care is calling regarding an established patient with [facility].       Requesting orders from: Savannah Ayala  Provider is following patient: Yes  Is this a 60-day recertification request?  No    Orders Requested    Speech Therapy  Request for extension of speech due to missed appointments   Frequency:  1x/wk for 2 wks    Continue cognitive home exercise program training for pt and family    Information was gathered and will be sent to provider for review.  RN will contact Home Care with information after provider review.  Confirmed ok to leave a detailed message with call back.  Contact information confirmed and updated as needed.    Ivette Farnsworth RN

## 2024-11-19 NOTE — TELEPHONE ENCOUNTER
Called and left detailed message to [facility] for approval of orders.      Carlene, RN    Triage Nurse  [facility]

## 2024-11-27 ENCOUNTER — TELEPHONE (OUTPATIENT)
Dept: FAMILY MEDICINE | Facility: CLINIC | Age: 79
End: 2024-11-27
Payer: COMMERCIAL

## 2024-11-27 NOTE — TELEPHONE ENCOUNTER
Forms received from [facility]/ Physician Order # [id number] (order date: 11/25/24) for Savannah Irving MD.  Forms placed in provider 'sign me' folder.  Please fax forms to [phone] after completion.    Erick ZEPEDA (R) (ARRT)  Radiology Dept

## 2025-02-11 ENCOUNTER — OFFICE VISIT (OUTPATIENT)
Dept: FAMILY MEDICINE | Facility: CLINIC | Age: 80
End: 2025-02-11
Payer: COMMERCIAL

## 2025-02-11 VITALS
WEIGHT: 147 LBS | TEMPERATURE: 98.7 F | SYSTOLIC BLOOD PRESSURE: 136 MMHG | RESPIRATION RATE: 16 BRPM | OXYGEN SATURATION: 97 % | BODY MASS INDEX: 27.05 KG/M2 | DIASTOLIC BLOOD PRESSURE: 68 MMHG | HEIGHT: 62 IN | HEART RATE: 62 BPM

## 2025-02-11 DIAGNOSIS — G30.9 ALZHEIMER'S DISEASE (H): Primary | ICD-10-CM

## 2025-02-11 DIAGNOSIS — M25.561 PAIN IN BOTH KNEES, UNSPECIFIED CHRONICITY: ICD-10-CM

## 2025-02-11 DIAGNOSIS — K50.10 CROHN'S DISEASE OF COLON WITHOUT COMPLICATION (H): ICD-10-CM

## 2025-02-11 DIAGNOSIS — F02.80 ALZHEIMER'S DISEASE (H): Primary | ICD-10-CM

## 2025-02-11 DIAGNOSIS — G47.00 INSOMNIA, UNSPECIFIED TYPE: ICD-10-CM

## 2025-02-11 DIAGNOSIS — M25.562 PAIN IN BOTH KNEES, UNSPECIFIED CHRONICITY: ICD-10-CM

## 2025-02-11 DIAGNOSIS — F32.1 CURRENT MODERATE EPISODE OF MAJOR DEPRESSIVE DISORDER WITHOUT PRIOR EPISODE (H): ICD-10-CM

## 2025-02-11 LAB
ERYTHROCYTE [DISTWIDTH] IN BLOOD BY AUTOMATED COUNT: 12.6 % (ref 10–15)
HCT VFR BLD AUTO: 41.2 % (ref 35–47)
HGB BLD-MCNC: 13.8 G/DL (ref 11.7–15.7)
MCH RBC QN AUTO: 30.3 PG (ref 26.5–33)
MCHC RBC AUTO-ENTMCNC: 33.5 G/DL (ref 31.5–36.5)
MCV RBC AUTO: 90 FL (ref 78–100)
PLATELET # BLD AUTO: 306 10E3/UL (ref 150–450)
RBC # BLD AUTO: 4.56 10E6/UL (ref 3.8–5.2)
WBC # BLD AUTO: 8.3 10E3/UL (ref 4–11)

## 2025-02-11 PROCEDURE — 99214 OFFICE O/P EST MOD 30 MIN: CPT | Performed by: FAMILY MEDICINE

## 2025-02-11 PROCEDURE — 36415 COLL VENOUS BLD VENIPUNCTURE: CPT | Performed by: FAMILY MEDICINE

## 2025-02-11 PROCEDURE — 82306 VITAMIN D 25 HYDROXY: CPT | Performed by: FAMILY MEDICINE

## 2025-02-11 PROCEDURE — 82607 VITAMIN B-12: CPT | Performed by: FAMILY MEDICINE

## 2025-02-11 PROCEDURE — 85027 COMPLETE CBC AUTOMATED: CPT | Performed by: FAMILY MEDICINE

## 2025-02-11 RX ORDER — TRAZODONE HYDROCHLORIDE 50 MG/1
25 TABLET ORAL
Qty: 30 TABLET | Refills: 1 | Status: SHIPPED | OUTPATIENT
Start: 2025-02-11

## 2025-02-11 ASSESSMENT — ANXIETY QUESTIONNAIRES
GAD7 TOTAL SCORE: 17
GAD7 TOTAL SCORE: 17
1. FEELING NERVOUS, ANXIOUS, OR ON EDGE: NEARLY EVERY DAY
4. TROUBLE RELAXING: NEARLY EVERY DAY
GAD7 TOTAL SCORE: 17
6. BECOMING EASILY ANNOYED OR IRRITABLE: SEVERAL DAYS
7. FEELING AFRAID AS IF SOMETHING AWFUL MIGHT HAPPEN: NEARLY EVERY DAY
5. BEING SO RESTLESS THAT IT IS HARD TO SIT STILL: NEARLY EVERY DAY
3. WORRYING TOO MUCH ABOUT DIFFERENT THINGS: SEVERAL DAYS
IF YOU CHECKED OFF ANY PROBLEMS ON THIS QUESTIONNAIRE, HOW DIFFICULT HAVE THESE PROBLEMS MADE IT FOR YOU TO DO YOUR WORK, TAKE CARE OF THINGS AT HOME, OR GET ALONG WITH OTHER PEOPLE: EXTREMELY DIFFICULT
8. IF YOU CHECKED OFF ANY PROBLEMS, HOW DIFFICULT HAVE THESE MADE IT FOR YOU TO DO YOUR WORK, TAKE CARE OF THINGS AT HOME, OR GET ALONG WITH OTHER PEOPLE?: EXTREMELY DIFFICULT
2. NOT BEING ABLE TO STOP OR CONTROL WORRYING: NEARLY EVERY DAY
7. FEELING AFRAID AS IF SOMETHING AWFUL MIGHT HAPPEN: NEARLY EVERY DAY

## 2025-02-11 ASSESSMENT — PATIENT HEALTH QUESTIONNAIRE - PHQ9
10. IF YOU CHECKED OFF ANY PROBLEMS, HOW DIFFICULT HAVE THESE PROBLEMS MADE IT FOR YOU TO DO YOUR WORK, TAKE CARE OF THINGS AT HOME, OR GET ALONG WITH OTHER PEOPLE: EXTREMELY DIFFICULT
SUM OF ALL RESPONSES TO PHQ QUESTIONS 1-9: 21
SUM OF ALL RESPONSES TO PHQ QUESTIONS 1-9: 21

## 2025-02-11 ASSESSMENT — PAIN SCALES - GENERAL: PAINLEVEL_OUTOF10: NO PAIN (0)

## 2025-02-11 NOTE — PROGRESS NOTES
"  Assessment & Plan     (G30.9,  F02.80) Alzheimer's disease (H)  (primary encounter diagnosis)  Comment: was diagnosed in 2020  Plan: Adult Neurology  Referral, CBC with         platelets, Vitamin B12        Did not have further follow up with our neurology clinic as she moved to [place].  Did have follow up in [place].  Will advise return to neurology for follow up to determine if any additional treatment options are of benefit and to help further describe progression and resources to daughter.    adjust therapy/treatment based on results    Discussed increasing day program to 5 days a week.  Discussed consideration for home health aid to provide home support 1-3 days a week (consider on weekends).  Will continue to provide assistance with strategies for support.    (F32.1) Current moderate episode of major depressive disorder without prior episode (H)  Comment:   Plan: CBC with platelets, Vitamin B12, Vitamin D         Deficiency, Adult Mental Health          Referral        Continue current medication  But will also trial prn trazodone for sleep.  Hold melatonin on nights when trazodone is used.    (M25.561,  M25.562) Pain in both knees, unspecified chronicity  Comment:   Plan: Orthopedic  Referral        May benefit from additional injections at this time    (G47.00) Insomnia, unspecified type  Comment:   Plan: traZODone (DESYREL) 50 MG tablet        I discussed with the patient risks and benefits of the new medications prescribed including potential side effects.  The patient had opportunity to ask questions and is comfortable with and interested in medications as prescribed.      (K50.10) Crohn's disease of colon without complication (H)  Comment: symptoms manageable for now  Plan: continue to monitor.          BMI  Estimated body mass index is 26.89 kg/m  as calculated from the following:    Height as of this encounter: 1.575 m (5' 2\").    Weight as of this encounter: 66.7 kg (147 lb). " "      Depression Screening Follow Up        2/11/2025     1:38 PM   PHQ   PHQ-9 Total Score 21    Q9: Thoughts of better off dead/self-harm past 2 weeks More than half the days   F/U: Thoughts of suicide or self-harm No   F/U: Safety concerns No       Patient-reported                  No data to display                      Follow Up Actions Taken  Mental Health Referral placed    Discussed the following ways the patient can remain in a safe environment:  be around others    FUTURE APPOINTMENTS:       - Follow-up visit in 4 months    Subjective   Julia is a 79 year old, presenting for the following health issues:  RECHECK    History provided by daughter.    Crying a lot.  Fear at night.  Nighttime is difficult.   Patient's bedroom is nextdoor to her daughters.  Sometimes patient asks daughter to lie down with her to help her go to sleep.  Sometimes paitient it getting up in the night - and wakes daughter up when she does.  When patient awakes in the morning, she wakes daughter up.  Often in the morning she is tearful or agitated.    Had knee injections.  Is starting to feel some pain again, is moving slower.    Goes to a day program from 10-2 two days a week.  Really enjoys it.    She and Daughter refer to it as Julia's \"work\". Julia feels that she is contributing to the physical activity of those around her, and that is what motivates her to continue to participate.    Is happier, less tearful, on days when she has her day program or on days she and Buffy are very busy.   On weekends, even though Buffy's whole family is around, because there is not planned activity, Julia has more agitation (fear, tearfulness)    Buffy's goal is to minimize medication and keep Julia in her home.    Buffy also notes some loose stools on some days.  Wonders about GI follow up.  Loose stools are not frequent during those days.          2/11/2025     1:43 PM   Additional Questions   Roomed by Katelyn WHITMAN MA   Accompanied by Daughter " "Buffy     History of Present Illness       Mental Health Follow-up:  Patient presents to follow-up on Depression & Anxiety.Patient's depression since last visit has been:  Worse  The patient is having other symptoms associated with depression.  Patient's anxiety since last visit has been:  Worse  The patient is having other symptoms associated with anxiety.  Any significant life events: grief or loss  Patient is feeling anxious or having panic attacks.  Patient has no concerns about alcohol or drug use.    She eats 2-3 servings of fruits and vegetables daily.She consumes 2 sweetened beverage(s) daily.She exercises with enough effort to increase her heart rate 20 to 29 minutes per day.  She exercises with enough effort to increase her heart rate 3 or less days per week.   She is taking medications regularly.                 Review of Systems  Constitutional, HEENT, cardiovascular, pulmonary, gi and gu systems are negative, except as otherwise noted.      Objective    /68 (BP Location: Right arm, Patient Position: Chair, Cuff Size: Adult Regular)   Pulse 62   Temp 98.7  F (37.1  C) (Tympanic)   Resp 16   Ht 1.575 m (5' 2\")   Wt 66.7 kg (147 lb)   LMP  (LMP Unknown)   SpO2 97%   Breastfeeding No   BMI 26.89 kg/m    Body mass index is 26.89 kg/m .  Physical Exam   GENERAL: alert and no distress  MS: bilateral pes anserine swelling, slightly tender.  SKIN: no suspicious lesions or rashes  PSYCH: mentation appears normal, affect normal/bright            Signed Electronically by: Savannah Irving MD    "

## 2025-02-12 ENCOUNTER — PATIENT OUTREACH (OUTPATIENT)
Dept: CARE COORDINATION | Facility: CLINIC | Age: 80
End: 2025-02-12
Payer: COMMERCIAL

## 2025-02-12 LAB
VIT B12 SERPL-MCNC: 432 PG/ML (ref 232–1245)
VIT D+METAB SERPL-MCNC: 22 NG/ML (ref 20–50)

## 2025-02-28 NOTE — PROGRESS NOTES
ASSESSMENT & PLAN    Julia was seen today for pain and pain.    Diagnoses and all orders for this visit:    Primary osteoarthritis of both knees  -     Large Joint Injection/Arthocentesis: bilateral knee        This issue is acute on chronic and Worsening.     # Bilateral knee pain: Julia Tabares  was seen today for bilateral knee pain follow-up. Symptoms had been going on for a number of years. On examination there are positive findings of joint line tenderness, medial soft tissue swelling. Imaging findings showed severe osteoarthritis, worst in the medial compartment. Likely cause of patient's condition due to osteoarthritis.  Counseled patient on nature of condition and treatment options. She had good relief of pain after the last injections in Nov 2024, but has had return of pain more recently.     - Treatment: Activities as tolerated.  - Medications/Injections: Naproxen twice daily or Topical Voltaren gel three times daily for pain for 2-4 weeks. Okay to take tylenol three times daily as needed in addition to these.     Follow-up:   - Could repeat injection after 3 months if injection was helpful but pain returns  - If no relief after 4 weeks, can consider medial  brace, home exercises, HA (though had reaction in 2017), nerve ablation assessment or genicular artery embolization    Is not a good surgical candidate given Alzheimer's diagnosis    Tramaine Campos MD  [facility] [place]    SUBJECTIVE- Interim History February 28, 2025    Chief Complaint   Patient presents with    Right Knee - Pain    Left Knee - Pain       Julia Tabares is a 79 year old female who is seen in f/u up for primary osteoarthritis of both knees. Since last visit on 11/6/24 patient has recurrent knee pain, L>R. The bilateral knee steroid injections performed at last office visit provided about 3 months of relief.     Location of Pain: bilateral anteromedial knees  Worsened by: stairs,  incline/decline walking, prolonged walking  Better with: steroid injections (most recently in May 2024 in [place] - 4-5 months of relief)  Treatments tried: corticosteroid injection (most recent date: May 2024) that provided  5 month(s) of relief and viscosupplementation injection (most recent date: November 2017)  Associated symptoms: swelling    The patient is seen with their daughter.    Orthopedic/Surgical history: YES - right knee MRI performed 12/27/2021; has previously seen Dr. Solorzano, Dr. Chua, Dr. 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  CV: distal perfusion intact   Resp: normal respiratory effort without conversational dyspnea   Psych: normal mood and affect  Gait: antalgic  Neuro: Normal light sensory exam of bilateral lower extremities     BILATERAL KNEE  Inspection:    Chronic soft tissue swelling of the medial knees  Palpation:    Tender about the lateral joint line and medial joint line. Remainder of bony and ligamentous landmarks are nontender.    Trace effusion is present    Patellofemoral crepitus is Present  Range of Motion:     00 extension to 1200 flexion  Strength:    5/5 flexion and extension    Extensor mechanism intact  Special Tests:    Negative: MCL/valgus stress (0 & 30 deg), LCL/varus stress (0 & 30 deg), Lachman's          RADIOLOGY:  Final results and radiologist's interpretation, available in the [facility] health record.  Images were reviewed with the patient in the office today.  My personal interpretation of the performed imaging:   - severe osteoarthritis, worst in the medial compartment      Large Joint Injection/Arthocentesis: bilateral knee    Date/Time: 3/3/2025 3:48 PM    Performed by: Tramaine Campos MD  Authorized by:  Tramaine Campos MD    Indications:  Pain and osteoarthritis  Needle Size:  22 G  Guidance: landmark guided    Approach:  Anterolateral  Location:  Knee  Laterality:  Bilateral      Medications (Right):  6 mg betamethasone acet & sod phos 6 (3-3) MG/ML; 4 mL ROPivacaine 5 MG/ML  Medications (Left):  6 mg betamethasone acet & sod phos 6 (3-3) MG/ML; 4 mL ROPivacaine 5 MG/ML  Outcome:  Tolerated well, no immediate complications  Procedure discussed: discussed risks, benefits, and alternatives    Consent Given by:  Patient  Timeout: timeout called immediately prior to procedure    Prep: patient was prepped and draped in usual sterile fashion      Patient tolerated bilateral knee steroid injection. Aftercare instructions given to patient. Plan to follow-up as above.      Tramaine Campos MD      Patient was independently assessed by me and was deemed appropriate for this procedure. Risks and benefits were discussed with good understanding including, but not limited to, the risks of bleeding, reaction to the medication, infection, temporary elevation of blood sugars, worsening or rapid acceleration of arthritis, and the possibility of the treatment being ineffective. The patient tolerated the procedure well with no complications.     After visit care instructions were discussed in person and provided in AVS.

## 2025-03-03 ENCOUNTER — OFFICE VISIT (OUTPATIENT)
Dept: ORTHOPEDICS | Facility: CLINIC | Age: 80
End: 2025-03-03
Payer: COMMERCIAL

## 2025-03-03 DIAGNOSIS — M17.0 PRIMARY OSTEOARTHRITIS OF BOTH KNEES: Primary | ICD-10-CM

## 2025-03-03 PROCEDURE — 99214 OFFICE O/P EST MOD 30 MIN: CPT | Mod: 25 | Performed by: STUDENT IN AN ORGANIZED HEALTH CARE EDUCATION/TRAINING PROGRAM

## 2025-03-03 PROCEDURE — 20610 DRAIN/INJ JOINT/BURSA W/O US: CPT | Mod: 50 | Performed by: STUDENT IN AN ORGANIZED HEALTH CARE EDUCATION/TRAINING PROGRAM

## 2025-03-03 RX ORDER — ROPIVACAINE HYDROCHLORIDE 5 MG/ML
4 INJECTION, SOLUTION EPIDURAL; INFILTRATION; PERINEURAL
Status: COMPLETED | OUTPATIENT
Start: 2025-03-03 | End: 2025-03-03

## 2025-03-03 RX ORDER — BETAMETHASONE SODIUM PHOSPHATE AND BETAMETHASONE ACETATE 3; 3 MG/ML; MG/ML
6 INJECTION, SUSPENSION INTRA-ARTICULAR; INTRALESIONAL; INTRAMUSCULAR; SOFT TISSUE
Status: COMPLETED | OUTPATIENT
Start: 2025-03-03 | End: 2025-03-03

## 2025-03-03 RX ADMIN — BETAMETHASONE SODIUM PHOSPHATE AND BETAMETHASONE ACETATE 6 MG: 3; 3 INJECTION, SUSPENSION INTRA-ARTICULAR; INTRALESIONAL; INTRAMUSCULAR; SOFT TISSUE at 15:48

## 2025-03-03 RX ADMIN — ROPIVACAINE HYDROCHLORIDE 4 ML: 5 INJECTION, SOLUTION EPIDURAL; INFILTRATION; PERINEURAL at 15:48

## 2025-03-03 NOTE — PATIENT INSTRUCTIONS
[facility] [place]    Procedure: bilateral knee cortisone injections    You may shower, however avoid swimming, tub baths or hot tubs for 24 hours following your procedure  You may have a mild to moderate increase in pain for several days following the injection.  It may take up to 14 days for the steroid medication to start working although you may feel the effect as early as a few days after the procedure.  You may use ice packs for 10-15 minutes, 3 to 4 times a day at the injection site for comfort  You may use anti-inflammatory medications (such as Ibuprofen or Aleve or Advil) or Tylenol for pain control if necessary and allowed to by your regular doctor  If you were fasting, you may resume your normal diet and medications after the procedure  If you have diabetes, check your blood sugar more frequently than usual as your blood sugar may be higher than normal for 10-14 days following a steroid injection. Contact your doctor who manages your diabetes if your blood sugar is higher than usual    If you experience any of the following, call [facility] @ [phone] or [phone]  - Fever over 100 degree F  - Swelling, bleeding, redness, drainage, warmth at the injection site  - New or worsening pain    Follow-up:   - Could repeat injection after 3 months if injection was helpful but pain returns  - If no relief after 4 weeks, let me know

## 2025-03-03 NOTE — LETTER
3/3/2025      Julia Tabares  [address] [address] [facility] [place] [address]      Dear Colleague,    Thank you for referring your patient, Julia Tabares, to the [facility]. Please see a copy of my visit note below.    ASSESSMENT & PLAN    Julia was seen today for pain and pain.    Diagnoses and all orders for this visit:    Primary osteoarthritis of both knees  -     Large Joint Injection/Arthocentesis: bilateral knee        This issue is acute on chronic and Worsening.     # Bilateral knee pain: Julia Tabares  was seen today for bilateral knee pain follow-up. Symptoms had been going on for a number of years. On examination there are positive findings of joint line tenderness, medial soft tissue swelling. Imaging findings showed severe osteoarthritis, worst in the medial compartment. Likely cause of patient's condition due to osteoarthritis.  Counseled patient on nature of condition and treatment options. She had good relief of pain after the last injections in Nov 2024, but has had return of pain more recently.     - Treatment: Activities as tolerated.  - Medications/Injections: Naproxen twice daily or Topical Voltaren gel three times daily for pain for 2-4 weeks. Okay to take tylenol three times daily as needed in addition to these.     Follow-up:   - Could repeat injection after 3 months if injection was helpful but pain returns  - If no relief after 4 weeks, can consider medial  brace, home exercises, HA (though had reaction in 2017), nerve ablation assessment or genicular artery embolization    Is not a good surgical candidate given Alzheimer's diagnosis    Tramaine Campos MD  [facility]    SUBJECTIVE- Interim History February 28, 2025    Chief Complaint   Patient presents with     Right Knee - Pain     Left Knee - Pain       Julia Tabares is a 79 year old female who is seen in f/u up for primary osteoarthritis of  both knees. Since last visit on 11/6/24 patient has recurrent knee pain, L>R. The bilateral knee steroid injections performed at last office visit provided about 3 months of relief.     Location of Pain: bilateral anteromedial knees  Worsened by: stairs, incline/decline walking, prolonged walking  Better with: steroid injections (most recently in May 2024 in [place] - 4-5 months of relief)  Treatments tried: corticosteroid injection (most recent date: May 2024) that provided  5 month(s) of relief and viscosupplementation injection (most recent date: November 2017)  Associated symptoms: swelling    The patient is seen with their daughter.    Orthopedic/Surgical history: YES - right knee MRI performed 12/27/2021; has previously seen Dr. Solorzano, Dr. Chua, Dr. 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  CV: distal perfusion intact   Resp: normal respiratory effort without conversational dyspnea   Psych: normal mood and affect  Gait: antalgic  Neuro: Normal light sensory exam of bilateral lower extremities     BILATERAL KNEE  Inspection:    Chronic soft tissue swelling of the medial knees  Palpation:    Tender about the lateral joint line and medial joint line. Remainder of bony and ligamentous landmarks are nontender.    Trace effusion is present    Patellofemoral crepitus is Present  Range of Motion:     00 extension to 1200 flexion  Strength:    5/5 flexion and extension    Extensor mechanism intact  Special Tests:    Negative: MCL/valgus stress (0 & 30 deg), LCL/varus stress (0 & 30 deg), Lachman's          RADIOLOGY:  Final results and radiologist's interpretation, available in the [facility] health record.  Images were reviewed with the patient in the office today.  My  personal interpretation of the performed imaging:   - severe osteoarthritis, worst in the medial compartment      Large Joint Injection/Arthocentesis: bilateral knee    Date/Time: 3/3/2025 3:48 PM    Performed by: Tramaine Campos MD  Authorized by: Tramaine Campos MD    Indications:  Pain and osteoarthritis  Needle Size:  22 G  Guidance: landmark guided    Approach:  Anterolateral  Location:  Knee  Laterality:  Bilateral      Medications (Right):  6 mg betamethasone acet & sod phos 6 (3-3) MG/ML; 4 mL ROPivacaine 5 MG/ML  Medications (Left):  6 mg betamethasone acet & sod phos 6 (3-3) MG/ML; 4 mL ROPivacaine 5 MG/ML  Outcome:  Tolerated well, no immediate complications  Procedure discussed: discussed risks, benefits, and alternatives    Consent Given by:  Patient  Timeout: timeout called immediately prior to procedure    Prep: patient was prepped and draped in usual sterile fashion      Patient tolerated bilateral knee steroid injection. Aftercare instructions given to patient. Plan to follow-up as above.      Tramaine Campos MD      Patient was independently assessed by me and was deemed appropriate for this procedure. Risks and benefits were discussed with good understanding including, but not limited to, the risks of bleeding, reaction to the medication, infection, temporary elevation of blood sugars, worsening or rapid acceleration of arthritis, and the possibility of the treatment being ineffective. The patient tolerated the procedure well with no complications.     After visit care instructions were discussed in person and provided in AVS.         Again, thank you for allowing me to participate in the care of your patient.        Sincerely,        Tramaine Campos MD    Electronically signed

## 2025-04-21 ENCOUNTER — NURSE TRIAGE (OUTPATIENT)
Dept: FAMILY MEDICINE | Facility: CLINIC | Age: 80
End: 2025-04-21
Payer: COMMERCIAL

## 2025-04-21 ENCOUNTER — MYC MEDICAL ADVICE (OUTPATIENT)
Dept: FAMILY MEDICINE | Facility: CLINIC | Age: 80
End: 2025-04-21
Payer: COMMERCIAL

## 2025-04-21 NOTE — TELEPHONE ENCOUNTER
"Received call from Buffy, patient's daughter and patient. C/o L low hip pain, radiating to mid-thigh starting this morning upon standing out of bed. Able to walk but painful and limiting movement. Triaged and advised UC today especially because patient is leaving at 930am on a flight out of town tomorrow. They verbalized understanding and will proceed.      Reason for Disposition   SEVERE pain (e.g., excruciating, unable to do any normal activities)    Additional Information   Negative: Looks like a broken bone or dislocated joint (e.g., crooked or deformed)   Negative: Sounds like a life-threatening emergency to the triager   Negative: Followed a hip injury   Negative: Leg pain is main symptom   Negative: Back pain radiating (shooting) into hip   Negative: SEVERE pain (e.g., excruciating, unable to do any normal activities) and fever   Negative: Can't stand (bear weight) or walk   Negative: Fever and red area (or area very tender to touch)   Negative: Patient sounds very sick or weak to the triager    Answer Assessment - Initial Assessment Questions  1. LOCATION and RADIATION: \"Where is the pain located?\"       L lower hip, radiating into mid-thigh   2. QUALITY: \"What does the pain feel like?\"  (e.g., sharp, dull, aching, burning)      Pinching, burning pain   3. SEVERITY: \"How bad is the pain?\" \"What does it keep you from doing?\"   (Scale 1-10; or mild, moderate, severe)      Severe, inhibiting walking - 7/10  4. ONSET: \"When did the pain start?\" \"Does it come and go, or is it there all the time?\"      Started this morning   5. WORK OR EXERCISE: \"Has there been any recent work or exercise that involved this part of the body?\"       No injuries   6. CAUSE: \"What do you think is causing the hip pain?\"       Unsure   7. AGGRAVATING FACTORS: \"What makes the hip pain worse?\" (e.g., walking, climbing stairs, running)      Standing  8. OTHER SYMPTOMS: \"Do you have any other symptoms?\" (e.g., back pain, pain shooting down " leg,  fever, rash)      Shooting down the leg    Protocols used: Hip Pain-A-[place]    SARA Aparicio RN  [facility]

## 2025-04-21 NOTE — TELEPHONE ENCOUNTER
See triage encounter from today    Looks like Buffy spoke with RN and they will head to  based off of RN's recommendations    SARA Saldana Triage RN  [facility]

## 2025-06-17 ENCOUNTER — OFFICE VISIT (OUTPATIENT)
Dept: FAMILY MEDICINE | Facility: CLINIC | Age: 80
End: 2025-06-17
Payer: COMMERCIAL

## 2025-06-17 VITALS
SYSTOLIC BLOOD PRESSURE: 110 MMHG | WEIGHT: 147.8 LBS | OXYGEN SATURATION: 97 % | TEMPERATURE: 98.5 F | HEART RATE: 79 BPM | RESPIRATION RATE: 16 BRPM | HEIGHT: 62 IN | BODY MASS INDEX: 27.2 KG/M2 | DIASTOLIC BLOOD PRESSURE: 60 MMHG

## 2025-06-17 DIAGNOSIS — G30.9 ALZHEIMER'S DISEASE (H): Primary | ICD-10-CM

## 2025-06-17 DIAGNOSIS — L98.9 SKIN LESION: ICD-10-CM

## 2025-06-17 DIAGNOSIS — F02.80 ALZHEIMER'S DISEASE (H): Primary | ICD-10-CM

## 2025-06-17 PROCEDURE — 99214 OFFICE O/P EST MOD 30 MIN: CPT | Performed by: FAMILY MEDICINE

## 2025-06-17 PROCEDURE — 3074F SYST BP LT 130 MM HG: CPT | Performed by: FAMILY MEDICINE

## 2025-06-17 PROCEDURE — 3078F DIAST BP <80 MM HG: CPT | Performed by: FAMILY MEDICINE

## 2025-06-17 NOTE — PROGRESS NOTES
"  Assessment & Plan     (G30.9,  F02.80) Alzheimer's disease (H)  (primary encounter diagnosis)  Comment: stable  Plan: support looking at memory care    (L98.9) Skin lesion  Comment:   Plan: Adult Dermatology  Referral        Advised derm consult for lesion on leg.      Follow up in Oct/Nov and then can spread to 6 months if remains stable.    Yesi Dumont is a 79 year old, presenting for the following health issues:  RECHECK        6/17/2025     1:53 PM   Additional Questions   Roomed by Katelyn WHITMAN MA   Accompanied by Daughter Buffy     History of Present Illness       Reason for visit:  Followup    She eats 2-3 servings of fruits and vegetables daily.She consumes 1 sweetened beverage(s) daily.She exercises with enough effort to increase her heart rate 20 to 29 minutes per day.  She exercises with enough effort to increase her heart rate 4 days per week.   She is taking medications regularly.      Skin spots, concerned about one on the right knee and some bumps in between the breast.       Most recent knee injections, did have some side effects for 1 month and then it went back to her normal.   Knees were more painful the next day - which isn't typical for her.  Back to baseline mobility.  Is getting a walker.  Otherwise gait has not changed.     Continues to live with daughter and her family.  Daughter is starting to look for memory care facilities.    Is using trazodone occasionally for sleep assistance.    Continues to have good days and bad days in terms of mood and memory.              Review of Systems  Constitutional, HEENT, cardiovascular, pulmonary, gi and gu systems are negative, except as otherwise noted.      Objective    /60   Pulse 79   Temp 98.5  F (36.9  C) (Oral)   Resp 16   Ht 1.575 m (5' 2\")   Wt 67 kg (147 lb 12.8 oz)   LMP  (LMP Unknown)   SpO2 97%   Breastfeeding No   BMI 27.03 kg/m    Body mass index is 27.03 kg/m .  Physical Exam   GENERAL: alert and no " distress  SKIN: skin tags of skin between breasts noted, lesion on leg which Is raised with scaly appearance.  PSYCH: affect normal/bright, appearance well groomed, and typical memory issues.            Signed Electronically by: Savannah Irving MD

## 2025-06-18 ENCOUNTER — PATIENT OUTREACH (OUTPATIENT)
Dept: CARE COORDINATION | Facility: CLINIC | Age: 80
End: 2025-06-18
Payer: COMMERCIAL

## 2025-07-14 ENCOUNTER — PATIENT OUTREACH (OUTPATIENT)
Dept: CARE COORDINATION | Facility: CLINIC | Age: 80
End: 2025-07-14
Payer: COMMERCIAL

## 2025-08-12 DIAGNOSIS — F32.1 CURRENT MODERATE EPISODE OF MAJOR DEPRESSIVE DISORDER WITHOUT PRIOR EPISODE (H): ICD-10-CM

## 2025-08-12 DIAGNOSIS — I10 BENIGN ESSENTIAL HYPERTENSION: ICD-10-CM

## 2025-08-13 RX ORDER — HYDROCHLOROTHIAZIDE 25 MG/1
25 TABLET ORAL DAILY
Qty: 90 TABLET | Refills: 2 | Status: SHIPPED | OUTPATIENT
Start: 2025-08-13

## 2025-08-13 RX ORDER — ESCITALOPRAM OXALATE 20 MG/1
20 TABLET ORAL DAILY
Qty: 90 TABLET | Refills: 3 | Status: SHIPPED | OUTPATIENT
Start: 2025-08-13